# Patient Record
Sex: FEMALE | Race: WHITE | NOT HISPANIC OR LATINO
[De-identification: names, ages, dates, MRNs, and addresses within clinical notes are randomized per-mention and may not be internally consistent; named-entity substitution may affect disease eponyms.]

---

## 2017-02-21 ENCOUNTER — APPOINTMENT (OUTPATIENT)
Dept: NEUROLOGY | Facility: HOSPITAL | Age: 62
End: 2017-02-21

## 2017-02-21 ENCOUNTER — OUTPATIENT (OUTPATIENT)
Dept: OUTPATIENT SERVICES | Facility: HOSPITAL | Age: 62
LOS: 1 days | End: 2017-02-21

## 2017-02-21 VITALS
HEIGHT: 64 IN | SYSTOLIC BLOOD PRESSURE: 122 MMHG | BODY MASS INDEX: 24.59 KG/M2 | DIASTOLIC BLOOD PRESSURE: 86 MMHG | WEIGHT: 144 LBS | HEART RATE: 90 BPM

## 2017-02-21 DIAGNOSIS — Z82.8 FAMILY HISTORY OF OTHER DISABILITIES AND CHRONIC DISEASES LEADING TO DISABLEMENT, NOT ELSEWHERE CLASSIFIED: Chronic | ICD-10-CM

## 2017-02-22 DIAGNOSIS — M54.9 DORSALGIA, UNSPECIFIED: ICD-10-CM

## 2017-02-23 ENCOUNTER — RX RENEWAL (OUTPATIENT)
Age: 62
End: 2017-02-23

## 2017-02-28 ENCOUNTER — APPOINTMENT (OUTPATIENT)
Dept: INTERNAL MEDICINE | Facility: CLINIC | Age: 62
End: 2017-02-28

## 2017-02-28 VITALS — HEART RATE: 72 BPM

## 2017-02-28 VITALS — SYSTOLIC BLOOD PRESSURE: 119 MMHG | OXYGEN SATURATION: 95 % | DIASTOLIC BLOOD PRESSURE: 80 MMHG

## 2017-02-28 DIAGNOSIS — Z87.09 PERSONAL HISTORY OF OTHER DISEASES OF THE RESPIRATORY SYSTEM: ICD-10-CM

## 2017-02-28 DIAGNOSIS — Z01.818 ENCOUNTER FOR OTHER PREPROCEDURAL EXAMINATION: ICD-10-CM

## 2017-02-28 RX ORDER — GABAPENTIN 100 MG/1
100 CAPSULE ORAL
Qty: 30 | Refills: 0 | Status: DISCONTINUED | COMMUNITY
Start: 2017-02-21 | End: 2017-02-28

## 2017-02-28 RX ORDER — ACETAMINOPHEN 325 MG
TABLET ORAL
Refills: 0 | Status: ACTIVE | COMMUNITY

## 2017-02-28 RX ORDER — DIAZEPAM 10 MG/1
10 TABLET ORAL
Refills: 0 | Status: ACTIVE | COMMUNITY

## 2017-03-28 ENCOUNTER — OUTPATIENT (OUTPATIENT)
Dept: OUTPATIENT SERVICES | Facility: HOSPITAL | Age: 62
LOS: 1 days | End: 2017-03-28

## 2017-03-28 ENCOUNTER — APPOINTMENT (OUTPATIENT)
Dept: NEUROLOGY | Facility: HOSPITAL | Age: 62
End: 2017-03-28

## 2017-03-28 VITALS — HEIGHT: 64 IN | DIASTOLIC BLOOD PRESSURE: 85 MMHG | HEART RATE: 67 BPM | SYSTOLIC BLOOD PRESSURE: 127 MMHG

## 2017-03-28 DIAGNOSIS — Z82.8 FAMILY HISTORY OF OTHER DISABILITIES AND CHRONIC DISEASES LEADING TO DISABLEMENT, NOT ELSEWHERE CLASSIFIED: Chronic | ICD-10-CM

## 2017-03-29 DIAGNOSIS — M48.06 SPINAL STENOSIS, LUMBAR REGION: ICD-10-CM

## 2017-03-29 DIAGNOSIS — M54.9 DORSALGIA, UNSPECIFIED: ICD-10-CM

## 2017-04-06 ENCOUNTER — MEDICATION RENEWAL (OUTPATIENT)
Age: 62
End: 2017-04-06

## 2017-05-09 ENCOUNTER — OUTPATIENT (OUTPATIENT)
Dept: OUTPATIENT SERVICES | Facility: HOSPITAL | Age: 62
LOS: 1 days | End: 2017-05-09

## 2017-05-09 ENCOUNTER — APPOINTMENT (OUTPATIENT)
Dept: NEUROLOGY | Facility: HOSPITAL | Age: 62
End: 2017-05-09

## 2017-05-09 VITALS
WEIGHT: 142 LBS | HEIGHT: 64 IN | BODY MASS INDEX: 24.24 KG/M2 | HEART RATE: 73 BPM | SYSTOLIC BLOOD PRESSURE: 115 MMHG | DIASTOLIC BLOOD PRESSURE: 82 MMHG

## 2017-05-09 DIAGNOSIS — Z82.8 FAMILY HISTORY OF OTHER DISABILITIES AND CHRONIC DISEASES LEADING TO DISABLEMENT, NOT ELSEWHERE CLASSIFIED: Chronic | ICD-10-CM

## 2017-05-09 DIAGNOSIS — M54.9 DORSALGIA, UNSPECIFIED: ICD-10-CM

## 2017-05-09 DIAGNOSIS — M48.06 SPINAL STENOSIS, LUMBAR REGION: ICD-10-CM

## 2017-05-12 ENCOUNTER — APPOINTMENT (OUTPATIENT)
Dept: INTERNAL MEDICINE | Facility: CLINIC | Age: 62
End: 2017-05-12

## 2017-05-12 VITALS
SYSTOLIC BLOOD PRESSURE: 119 MMHG | DIASTOLIC BLOOD PRESSURE: 85 MMHG | OXYGEN SATURATION: 98 % | WEIGHT: 143 LBS | BODY MASS INDEX: 24.55 KG/M2 | HEART RATE: 73 BPM

## 2017-05-12 VITALS — TEMPERATURE: 98 F

## 2017-06-02 LAB
25(OH)D3 SERPL-MCNC: 51.9 NG/ML
ALBUMIN SERPL ELPH-MCNC: 4.2 G/DL
ALP BLD-CCNC: 57 U/L
ALT SERPL-CCNC: 23 U/L
ANION GAP SERPL CALC-SCNC: 16 MMOL/L
AST SERPL-CCNC: 29 U/L
BASOPHILS # BLD AUTO: 0.03 K/UL
BASOPHILS NFR BLD AUTO: 0.5 %
BILIRUB SERPL-MCNC: 0.3 MG/DL
BUN SERPL-MCNC: 9 MG/DL
CALCIUM SERPL-MCNC: 9.8 MG/DL
CHLORIDE SERPL-SCNC: 101 MMOL/L
CHOLEST SERPL-MCNC: 217 MG/DL
CHOLEST/HDLC SERPL: 3 RATIO
CO2 SERPL-SCNC: 22 MMOL/L
CREAT SERPL-MCNC: 0.95 MG/DL
EOSINOPHIL # BLD AUTO: 0.57 K/UL
EOSINOPHIL NFR BLD AUTO: 8.8 %
GLUCOSE SERPL-MCNC: 92 MG/DL
HBA1C MFR BLD HPLC: 5.9 %
HCT VFR BLD CALC: 37.9 %
HDLC SERPL-MCNC: 79 MG/DL
HGB BLD-MCNC: 12.9 G/DL
HIV1+2 AB SPEC QL IA.RAPID: NONREACTIVE
IMM GRANULOCYTES NFR BLD AUTO: 0.2 %
LDLC SERPL CALC-MCNC: 120 MG/DL
LPL SERPL-CCNC: 23 U/L
LYMPHOCYTES # BLD AUTO: 1.94 K/UL
LYMPHOCYTES NFR BLD AUTO: 29.8 %
MAN DIFF?: NORMAL
MCHC RBC-ENTMCNC: 32.3 PG
MCHC RBC-ENTMCNC: 34 GM/DL
MCV RBC AUTO: 94.8 FL
MONOCYTES # BLD AUTO: 0.52 K/UL
MONOCYTES NFR BLD AUTO: 8 %
NEUTROPHILS # BLD AUTO: 3.44 K/UL
NEUTROPHILS NFR BLD AUTO: 52.7 %
PLATELET # BLD AUTO: 345 K/UL
POTASSIUM SERPL-SCNC: 4.9 MMOL/L
PROT SERPL-MCNC: 6.7 G/DL
RBC # BLD: 4 M/UL
RBC # FLD: 13.9 %
SODIUM SERPL-SCNC: 139 MMOL/L
TRIGL SERPL-MCNC: 89 MG/DL
TSH SERPL-ACNC: 1.72 UIU/ML
WBC # FLD AUTO: 6.51 K/UL

## 2017-06-06 ENCOUNTER — OUTPATIENT (OUTPATIENT)
Dept: OUTPATIENT SERVICES | Facility: HOSPITAL | Age: 62
LOS: 1 days | End: 2017-06-06

## 2017-06-06 ENCOUNTER — APPOINTMENT (OUTPATIENT)
Dept: NEUROLOGY | Facility: HOSPITAL | Age: 62
End: 2017-06-06

## 2017-06-06 VITALS
WEIGHT: 143 LBS | HEART RATE: 78 BPM | BODY MASS INDEX: 24.41 KG/M2 | HEIGHT: 64 IN | DIASTOLIC BLOOD PRESSURE: 90 MMHG | SYSTOLIC BLOOD PRESSURE: 124 MMHG

## 2017-06-06 DIAGNOSIS — Z82.8 FAMILY HISTORY OF OTHER DISABILITIES AND CHRONIC DISEASES LEADING TO DISABLEMENT, NOT ELSEWHERE CLASSIFIED: Chronic | ICD-10-CM

## 2017-06-07 DIAGNOSIS — M48.06 SPINAL STENOSIS, LUMBAR REGION: ICD-10-CM

## 2017-06-19 ENCOUNTER — APPOINTMENT (OUTPATIENT)
Dept: INTERNAL MEDICINE | Facility: CLINIC | Age: 62
End: 2017-06-19

## 2017-06-19 VITALS
WEIGHT: 144 LBS | DIASTOLIC BLOOD PRESSURE: 90 MMHG | SYSTOLIC BLOOD PRESSURE: 130 MMHG | HEIGHT: 64 IN | BODY MASS INDEX: 24.59 KG/M2

## 2017-06-19 VITALS — DIASTOLIC BLOOD PRESSURE: 70 MMHG | SYSTOLIC BLOOD PRESSURE: 120 MMHG | HEART RATE: 76 BPM

## 2017-06-19 DIAGNOSIS — J45.991 COUGH VARIANT ASTHMA: ICD-10-CM

## 2017-06-19 DIAGNOSIS — K64.4 RESIDUAL HEMORRHOIDAL SKIN TAGS: ICD-10-CM

## 2017-06-19 DIAGNOSIS — R22.1 LOCALIZED SWELLING, MASS AND LUMP, NECK: ICD-10-CM

## 2017-06-19 DIAGNOSIS — Z81.8 FAMILY HISTORY OF OTHER MENTAL AND BEHAVIORAL DISORDERS: ICD-10-CM

## 2017-06-19 DIAGNOSIS — Z86.19 PERSONAL HISTORY OF OTHER INFECTIOUS AND PARASITIC DISEASES: ICD-10-CM

## 2017-06-19 DIAGNOSIS — Z87.42 PERSONAL HISTORY OF OTHER DISEASES OF THE FEMALE GENITAL TRACT: ICD-10-CM

## 2017-06-19 DIAGNOSIS — Z72.0 TOBACCO USE: ICD-10-CM

## 2017-06-19 DIAGNOSIS — Z00.00 ENCOUNTER FOR GENERAL ADULT MEDICAL EXAMINATION W/OUT ABNORMAL FINDINGS: ICD-10-CM

## 2017-06-19 DIAGNOSIS — R60.0 LOCALIZED EDEMA: ICD-10-CM

## 2017-06-19 DIAGNOSIS — Z71.6 TOBACCO ABUSE COUNSELING: ICD-10-CM

## 2017-06-19 DIAGNOSIS — R21 RASH AND OTHER NONSPECIFIC SKIN ERUPTION: ICD-10-CM

## 2017-06-19 DIAGNOSIS — I25.10 ATHEROSCLEROTIC HEART DISEASE OF NATIVE CORONARY ARTERY W/OUT ANGINA PECTORIS: ICD-10-CM

## 2017-06-19 DIAGNOSIS — F32.9 MAJOR DEPRESSIVE DISORDER, SINGLE EPISODE, UNSPECIFIED: ICD-10-CM

## 2017-06-19 RX ORDER — ALBUTEROL SULFATE 108 UG/1
108 (90 BASE) AEROSOL, METERED RESPIRATORY (INHALATION) EVERY 6 HOURS
Qty: 1 | Refills: 5 | Status: ACTIVE | COMMUNITY
Start: 2017-02-21 | End: 1900-01-01

## 2017-06-19 RX ORDER — TRAMADOL HYDROCHLORIDE 50 MG/1
50 TABLET, COATED ORAL
Qty: 60 | Refills: 1 | Status: DISCONTINUED | COMMUNITY
Start: 2017-05-09 | End: 2017-06-19

## 2017-06-19 RX ORDER — ESOMEPRAZOLE MAGNESIUM 40 MG/1
CAPSULE, DELAYED RELEASE ORAL
Refills: 0 | Status: ACTIVE | COMMUNITY

## 2017-06-19 RX ORDER — PSYLLIUM HUSK 0.4 G
CAPSULE ORAL
Refills: 0 | Status: DISCONTINUED | COMMUNITY
End: 2017-06-19

## 2017-06-19 RX ORDER — LINACLOTIDE 72 UG/1
CAPSULE, GELATIN COATED ORAL
Refills: 0 | Status: ACTIVE | COMMUNITY

## 2017-06-19 RX ORDER — NYSTATIN 100000 [USP'U]/ML
100000 SUSPENSION ORAL 4 TIMES DAILY
Qty: 1 | Refills: 3 | Status: DISCONTINUED | COMMUNITY
Start: 2017-05-12 | End: 2017-06-19

## 2017-06-19 RX ORDER — IBUPROFEN AND PHENYLEPHRINE HYDROCHLORIDE 200; 10 MG/1; MG/1
TABLET, FILM COATED ORAL
Refills: 0 | Status: ACTIVE | COMMUNITY

## 2017-07-05 ENCOUNTER — RX RENEWAL (OUTPATIENT)
Age: 62
End: 2017-07-05

## 2017-07-24 ENCOUNTER — FORM ENCOUNTER (OUTPATIENT)
Age: 62
End: 2017-07-24

## 2017-07-25 ENCOUNTER — APPOINTMENT (OUTPATIENT)
Dept: MAMMOGRAPHY | Facility: IMAGING CENTER | Age: 62
End: 2017-07-25

## 2017-07-25 ENCOUNTER — OUTPATIENT (OUTPATIENT)
Dept: OUTPATIENT SERVICES | Facility: HOSPITAL | Age: 62
LOS: 1 days | End: 2017-07-25
Payer: MEDICAID

## 2017-07-25 DIAGNOSIS — Z00.8 ENCOUNTER FOR OTHER GENERAL EXAMINATION: ICD-10-CM

## 2017-07-25 DIAGNOSIS — Z82.8 FAMILY HISTORY OF OTHER DISABILITIES AND CHRONIC DISEASES LEADING TO DISABLEMENT, NOT ELSEWHERE CLASSIFIED: Chronic | ICD-10-CM

## 2017-07-25 PROCEDURE — 77063 BREAST TOMOSYNTHESIS BI: CPT

## 2017-07-25 PROCEDURE — 77067 SCR MAMMO BI INCL CAD: CPT

## 2017-08-01 ENCOUNTER — OUTPATIENT (OUTPATIENT)
Dept: OUTPATIENT SERVICES | Facility: HOSPITAL | Age: 62
LOS: 1 days | End: 2017-08-01

## 2017-08-01 ENCOUNTER — APPOINTMENT (OUTPATIENT)
Dept: NEUROLOGY | Facility: HOSPITAL | Age: 62
End: 2017-08-01

## 2017-08-01 VITALS
SYSTOLIC BLOOD PRESSURE: 139 MMHG | WEIGHT: 147 LBS | HEIGHT: 64 IN | HEART RATE: 76 BPM | DIASTOLIC BLOOD PRESSURE: 85 MMHG | BODY MASS INDEX: 25.1 KG/M2

## 2017-08-01 DIAGNOSIS — Z82.8 FAMILY HISTORY OF OTHER DISABILITIES AND CHRONIC DISEASES LEADING TO DISABLEMENT, NOT ELSEWHERE CLASSIFIED: Chronic | ICD-10-CM

## 2017-08-01 DIAGNOSIS — M54.9 DORSALGIA, UNSPECIFIED: ICD-10-CM

## 2017-08-09 ENCOUNTER — FORM ENCOUNTER (OUTPATIENT)
Age: 62
End: 2017-08-09

## 2017-08-10 ENCOUNTER — APPOINTMENT (OUTPATIENT)
Dept: CT IMAGING | Facility: IMAGING CENTER | Age: 62
End: 2017-08-10
Payer: MEDICAID

## 2017-08-10 ENCOUNTER — OUTPATIENT (OUTPATIENT)
Dept: OUTPATIENT SERVICES | Facility: HOSPITAL | Age: 62
LOS: 1 days | End: 2017-08-10
Payer: MEDICAID

## 2017-08-10 DIAGNOSIS — Z00.00 ENCOUNTER FOR GENERAL ADULT MEDICAL EXAMINATION WITHOUT ABNORMAL FINDINGS: ICD-10-CM

## 2017-08-10 DIAGNOSIS — Z82.8 FAMILY HISTORY OF OTHER DISABILITIES AND CHRONIC DISEASES LEADING TO DISABLEMENT, NOT ELSEWHERE CLASSIFIED: Chronic | ICD-10-CM

## 2017-08-10 PROCEDURE — G0297: CPT

## 2017-08-10 PROCEDURE — G0297: CPT | Mod: 26

## 2017-08-16 PROBLEM — I25.10 ATHEROSCLEROSIS OF CORONARY ARTERY: Status: ACTIVE | Noted: 2017-08-16

## 2017-09-05 ENCOUNTER — APPOINTMENT (OUTPATIENT)
Dept: NEUROLOGY | Facility: HOSPITAL | Age: 62
End: 2017-09-05

## 2017-09-05 ENCOUNTER — OUTPATIENT (OUTPATIENT)
Dept: OUTPATIENT SERVICES | Facility: HOSPITAL | Age: 62
LOS: 1 days | End: 2017-09-05

## 2017-09-05 VITALS
HEIGHT: 64 IN | SYSTOLIC BLOOD PRESSURE: 143 MMHG | HEART RATE: 74 BPM | DIASTOLIC BLOOD PRESSURE: 82 MMHG | BODY MASS INDEX: 25.1 KG/M2 | WEIGHT: 147 LBS

## 2017-09-05 DIAGNOSIS — Z82.8 FAMILY HISTORY OF OTHER DISABILITIES AND CHRONIC DISEASES LEADING TO DISABLEMENT, NOT ELSEWHERE CLASSIFIED: Chronic | ICD-10-CM

## 2017-09-05 DIAGNOSIS — M79.1 MYALGIA: ICD-10-CM

## 2017-10-03 ENCOUNTER — APPOINTMENT (OUTPATIENT)
Dept: NEUROLOGY | Facility: HOSPITAL | Age: 62
End: 2017-10-03

## 2017-10-03 ENCOUNTER — OUTPATIENT (OUTPATIENT)
Dept: OUTPATIENT SERVICES | Facility: HOSPITAL | Age: 62
LOS: 1 days | End: 2017-10-03

## 2017-10-03 ENCOUNTER — LABORATORY RESULT (OUTPATIENT)
Age: 62
End: 2017-10-03

## 2017-10-03 VITALS
BODY MASS INDEX: 25.95 KG/M2 | DIASTOLIC BLOOD PRESSURE: 89 MMHG | SYSTOLIC BLOOD PRESSURE: 124 MMHG | WEIGHT: 152 LBS | HEIGHT: 64 IN | HEART RATE: 74 BPM

## 2017-10-03 DIAGNOSIS — Z82.8 FAMILY HISTORY OF OTHER DISABILITIES AND CHRONIC DISEASES LEADING TO DISABLEMENT, NOT ELSEWHERE CLASSIFIED: Chronic | ICD-10-CM

## 2017-10-03 LAB
AMPHET UR-MCNC: NEGATIVE — SIGNIFICANT CHANGE UP
BARBITURATES UR SCN-MCNC: NEGATIVE — SIGNIFICANT CHANGE UP
BENZODIAZ UR-MCNC: POSITIVE — SIGNIFICANT CHANGE UP
CANNABINOIDS UR-MCNC: NEGATIVE — SIGNIFICANT CHANGE UP
COCAINE METAB.OTHER UR-MCNC: NEGATIVE — SIGNIFICANT CHANGE UP
METHADONE UR-MCNC: NEGATIVE — SIGNIFICANT CHANGE UP
OPIATES UR-MCNC: POSITIVE — SIGNIFICANT CHANGE UP
OXYCODONE UR-MCNC: NEGATIVE — SIGNIFICANT CHANGE UP
PCP UR-MCNC: NEGATIVE — SIGNIFICANT CHANGE UP

## 2017-10-13 DIAGNOSIS — M54.5 LOW BACK PAIN: ICD-10-CM

## 2017-10-13 DIAGNOSIS — G89.29 OTHER CHRONIC PAIN: ICD-10-CM

## 2017-10-31 ENCOUNTER — OUTPATIENT (OUTPATIENT)
Dept: OUTPATIENT SERVICES | Facility: HOSPITAL | Age: 62
LOS: 1 days | End: 2017-10-31

## 2017-10-31 ENCOUNTER — APPOINTMENT (OUTPATIENT)
Dept: NEUROLOGY | Facility: HOSPITAL | Age: 62
End: 2017-10-31

## 2017-10-31 VITALS
HEIGHT: 64 IN | BODY MASS INDEX: 25.44 KG/M2 | SYSTOLIC BLOOD PRESSURE: 137 MMHG | DIASTOLIC BLOOD PRESSURE: 94 MMHG | HEART RATE: 71 BPM | WEIGHT: 149 LBS

## 2017-10-31 DIAGNOSIS — Z82.8 FAMILY HISTORY OF OTHER DISABILITIES AND CHRONIC DISEASES LEADING TO DISABLEMENT, NOT ELSEWHERE CLASSIFIED: Chronic | ICD-10-CM

## 2017-11-01 DIAGNOSIS — M54.9 DORSALGIA, UNSPECIFIED: ICD-10-CM

## 2017-11-03 ENCOUNTER — OTHER (OUTPATIENT)
Age: 62
End: 2017-11-03

## 2017-11-05 ENCOUNTER — EMERGENCY (EMERGENCY)
Facility: HOSPITAL | Age: 62
LOS: 1 days | Discharge: HOME | End: 2017-11-05
Attending: EMERGENCY MEDICINE | Admitting: EMERGENCY MEDICINE
Payer: MEDICAID

## 2017-11-05 VITALS
OXYGEN SATURATION: 100 % | HEART RATE: 84 BPM | TEMPERATURE: 99 F | DIASTOLIC BLOOD PRESSURE: 101 MMHG | SYSTOLIC BLOOD PRESSURE: 156 MMHG | RESPIRATION RATE: 18 BRPM

## 2017-11-05 DIAGNOSIS — Z82.8 FAMILY HISTORY OF OTHER DISABILITIES AND CHRONIC DISEASES LEADING TO DISABLEMENT, NOT ELSEWHERE CLASSIFIED: Chronic | ICD-10-CM

## 2017-11-05 PROCEDURE — 99283 EMERGENCY DEPT VISIT LOW MDM: CPT

## 2017-11-05 RX ORDER — HYDROMORPHONE HYDROCHLORIDE 2 MG/ML
1 INJECTION INTRAMUSCULAR; INTRAVENOUS; SUBCUTANEOUS
Qty: 14 | Refills: 0
Start: 2017-11-05 | End: 2017-11-12

## 2017-11-05 NOTE — ED PROVIDER NOTE - PROGRESS NOTE DETAILS
I sent rx for 7 days of her 4mg bid Hydromorphone after confirming no successful rx on allscripts and reviewing iSTOP. Pt to call pain clinic this week (states they are there on Tuesdays) to obtain further rx.

## 2017-11-05 NOTE — ED PROVIDER NOTE - MEDICAL DECISION MAKING DETAILS
Back pain, chronic, no neuro deficits.  no fevers.  Out of her meds and Clinic rx did not go through for some reason. Will provide short rx pending clinic f/u this week.

## 2017-11-05 NOTE — ED ADULT TRIAGE NOTE - CHIEF COMPLAINT QUOTE
Patient reports being seen at neurology center and prescribed hydromorphone for chronic pain. Patient reports she is unable to get the medication prescribed from pharmacy.

## 2017-11-05 NOTE — ED PROVIDER NOTE - PMH
Anxiety    Chronic midline back pain, unspecified back location    COPD (chronic obstructive pulmonary disease)    GERD (gastroesophageal reflux disease)

## 2017-11-05 NOTE — ED PROVIDER NOTE - OBJECTIVE STATEMENT
62 yr old F c complex PMhx including remote Benzo/EtOH overdose, chronic back pain on Hydromorphone (managed by Dr. Abdul at [facility]), recent abscess, who p/w "I couldn't get my Hydromorphone filled".  Per pt rx was not transmitted and she is now suffering from pain, more than usual. States she spoke with clinic late last week and they said it would be fixed, but not available yet. Not asking for any pain med right now. No neuro deficits, no change in her chronic pain. I reviewed Allscripts and confirmed that on 10/31 rx for Hydromorphone 4mg bid was sent too pharmacy but status is noted as 'Denied", there was another script also not transmitted. iSTOP report #[id number] does not show evidence of excessive dosing/rx - only once monthly rx for Hydromorphone by Dr. Abdul. No recent change in chronic conditions. NAD.

## 2017-11-06 ENCOUNTER — MESSAGE (OUTPATIENT)
Age: 62
End: 2017-11-06

## 2017-11-06 ENCOUNTER — RX RENEWAL (OUTPATIENT)
Age: 62
End: 2017-11-06

## 2017-11-06 DIAGNOSIS — Z98.890 OTHER SPECIFIED POSTPROCEDURAL STATES: Chronic | ICD-10-CM

## 2017-11-09 ENCOUNTER — APPOINTMENT (OUTPATIENT)
Dept: PULMONOLOGY | Facility: CLINIC | Age: 62
End: 2017-11-09

## 2017-12-05 ENCOUNTER — RX RENEWAL (OUTPATIENT)
Age: 62
End: 2017-12-05

## 2018-01-02 ENCOUNTER — APPOINTMENT (OUTPATIENT)
Dept: NEUROLOGY | Facility: HOSPITAL | Age: 63
End: 2018-01-02

## 2018-01-02 ENCOUNTER — OUTPATIENT (OUTPATIENT)
Dept: OUTPATIENT SERVICES | Facility: HOSPITAL | Age: 63
LOS: 1 days | End: 2018-01-02

## 2018-01-02 VITALS
WEIGHT: 148.2 LBS | BODY MASS INDEX: 25.3 KG/M2 | HEIGHT: 64 IN | HEART RATE: 89 BPM | SYSTOLIC BLOOD PRESSURE: 128 MMHG | DIASTOLIC BLOOD PRESSURE: 99 MMHG

## 2018-01-02 DIAGNOSIS — Z82.8 FAMILY HISTORY OF OTHER DISABILITIES AND CHRONIC DISEASES LEADING TO DISABLEMENT, NOT ELSEWHERE CLASSIFIED: Chronic | ICD-10-CM

## 2018-01-02 DIAGNOSIS — Z98.890 OTHER SPECIFIED POSTPROCEDURAL STATES: Chronic | ICD-10-CM

## 2018-01-06 ENCOUNTER — EMERGENCY (EMERGENCY)
Facility: HOSPITAL | Age: 63
LOS: 1 days | Discharge: HOME | End: 2018-01-06
Admitting: EMERGENCY MEDICINE
Payer: MEDICAID

## 2018-01-06 VITALS
RESPIRATION RATE: 18 BRPM | DIASTOLIC BLOOD PRESSURE: 100 MMHG | SYSTOLIC BLOOD PRESSURE: 144 MMHG | OXYGEN SATURATION: 100 % | TEMPERATURE: 99 F | HEART RATE: 83 BPM

## 2018-01-06 DIAGNOSIS — Z98.890 OTHER SPECIFIED POSTPROCEDURAL STATES: Chronic | ICD-10-CM

## 2018-01-06 DIAGNOSIS — Z82.8 FAMILY HISTORY OF OTHER DISABILITIES AND CHRONIC DISEASES LEADING TO DISABLEMENT, NOT ELSEWHERE CLASSIFIED: Chronic | ICD-10-CM

## 2018-01-06 PROCEDURE — 99281 EMR DPT VST MAYX REQ PHY/QHP: CPT

## 2018-01-06 RX ORDER — HYDROMORPHONE HYDROCHLORIDE 2 MG/ML
1 INJECTION INTRAMUSCULAR; INTRAVENOUS; SUBCUTANEOUS
Qty: 8 | Refills: 0
Start: 2018-01-06 | End: 2018-01-09

## 2018-01-06 NOTE — ED PROVIDER NOTE - CHPI ED SYMPTOMS NEG
no numbness/no vomiting/no weakness/no tingling/no decreased eating/drinking/no dizziness/no fever/no nausea/no chills

## 2018-01-06 NOTE — ED PROVIDER NOTE - PHYSICAL EXAMINATION
BACK: no midline tenderness, no palpable step offs, no erythema/ecchymosis/swelling, FROM. Non tender throughout. NVI. sensate intact. Able to ambulate.

## 2018-01-06 NOTE — ED ADULT TRIAGE NOTE - CHIEF COMPLAINT QUOTE
ran out of meds    pt states her prescription for hydromorphone did not go through and ran out of meds. no complaints other than her chronic pain

## 2018-01-06 NOTE — ED PROVIDER NOTE - MEDICAL DECISION MAKING DETAILS
61 yo F here for medication refill, chronically on diluadid 4mg BID for severe pain 2/2 to chronic back pain. Pt without additional symptoms or complaints. Pain consistent with chronic back pain. Discussed with patient istop without e/o abuse or inappropriate prescribing. Will give short course to cover until follow up with neuro for re-send of full rx. pt agrees with plan. educated on risks and side effects of medication, no driving, operating machinery, etc.

## 2018-01-06 NOTE — ED PROVIDER NOTE - OBJECTIVE STATEMENT
61 yo F hx of chronic pain here for medication refill. Pt reports she gets her monthly Dilaudid dose of 4mg BID for severe pain from her Neuro Dr. Abdul however the last rx that was sent did not go through to patients pharmacy prompting patient to arrive to ED today. Pt states she tried to call office however was post hours and could not get in touch with covering MD. Pt reports that she is at her baseline for her chronic pain. Pt declines needing pain meds in ED. Pt reports that she has no additional complaints. Pt looked up on Istop #[id number] with last dilaudid refill on 12/6 which corresponds to today's date of 1/6 as patient is on monthly dosing. Pt without additional complaints, declining pain meds in ED. Normal BM and UOP, no incontinence, numbness, saddle anesthesia. No evidence of recent rx or abuse on istop. Discussed with patient can give short course to cover for remainder of weekend however will have to get in touch with Dr. Abdul's office on Monday for full refill. pt agrees with this plan. Denies fever chills vomiting diarrhea cp sob weakness HA dizziness. Denies IVDU.

## 2018-01-09 ENCOUNTER — RX RENEWAL (OUTPATIENT)
Age: 63
End: 2018-01-09

## 2018-01-12 DIAGNOSIS — M54.9 DORSALGIA, UNSPECIFIED: ICD-10-CM

## 2018-01-29 ENCOUNTER — MESSAGE (OUTPATIENT)
Age: 63
End: 2018-01-29

## 2018-01-29 RX ORDER — ROSUVASTATIN CALCIUM 5 MG/1
5 TABLET, FILM COATED ORAL
Qty: 30 | Refills: 5 | Status: DISCONTINUED | COMMUNITY
Start: 2018-01-11 | End: 2018-01-29

## 2018-01-29 RX ORDER — ATORVASTATIN CALCIUM 10 MG/1
10 TABLET, FILM COATED ORAL
Qty: 30 | Refills: 3 | Status: ACTIVE | COMMUNITY
Start: 2018-01-29 | End: 1900-01-01

## 2018-02-06 ENCOUNTER — OUTPATIENT (OUTPATIENT)
Dept: OUTPATIENT SERVICES | Facility: HOSPITAL | Age: 63
LOS: 1 days | End: 2018-02-06

## 2018-02-06 ENCOUNTER — APPOINTMENT (OUTPATIENT)
Dept: NEUROLOGY | Facility: HOSPITAL | Age: 63
End: 2018-02-06

## 2018-02-06 VITALS
WEIGHT: 147 LBS | SYSTOLIC BLOOD PRESSURE: 136 MMHG | BODY MASS INDEX: 25.1 KG/M2 | HEIGHT: 64 IN | DIASTOLIC BLOOD PRESSURE: 91 MMHG | HEART RATE: 86 BPM

## 2018-02-06 DIAGNOSIS — Z82.8 FAMILY HISTORY OF OTHER DISABILITIES AND CHRONIC DISEASES LEADING TO DISABLEMENT, NOT ELSEWHERE CLASSIFIED: Chronic | ICD-10-CM

## 2018-02-06 DIAGNOSIS — Z98.890 OTHER SPECIFIED POSTPROCEDURAL STATES: Chronic | ICD-10-CM

## 2018-03-06 ENCOUNTER — APPOINTMENT (OUTPATIENT)
Dept: NEUROLOGY | Facility: HOSPITAL | Age: 63
End: 2018-03-06

## 2018-03-06 ENCOUNTER — RESULT REVIEW (OUTPATIENT)
Age: 63
End: 2018-03-06

## 2018-04-03 ENCOUNTER — OUTPATIENT (OUTPATIENT)
Dept: OUTPATIENT SERVICES | Facility: HOSPITAL | Age: 63
LOS: 1 days | End: 2018-04-03

## 2018-04-03 ENCOUNTER — TRANSCRIPTION ENCOUNTER (OUTPATIENT)
Age: 63
End: 2018-04-03

## 2018-04-03 ENCOUNTER — APPOINTMENT (OUTPATIENT)
Dept: NEUROLOGY | Facility: HOSPITAL | Age: 63
End: 2018-04-03

## 2018-04-03 VITALS
HEIGHT: 64 IN | SYSTOLIC BLOOD PRESSURE: 130 MMHG | HEART RATE: 78 BPM | DIASTOLIC BLOOD PRESSURE: 89 MMHG | BODY MASS INDEX: 25.95 KG/M2 | WEIGHT: 152 LBS

## 2018-04-03 DIAGNOSIS — Z98.890 OTHER SPECIFIED POSTPROCEDURAL STATES: Chronic | ICD-10-CM

## 2018-04-03 DIAGNOSIS — Z82.8 FAMILY HISTORY OF OTHER DISABILITIES AND CHRONIC DISEASES LEADING TO DISABLEMENT, NOT ELSEWHERE CLASSIFIED: Chronic | ICD-10-CM

## 2018-04-04 DIAGNOSIS — M48.061 SPINAL STENOSIS, LUMBAR REGION WITHOUT NEUROGENIC CLAUDICATION: ICD-10-CM

## 2018-05-01 ENCOUNTER — APPOINTMENT (OUTPATIENT)
Dept: NEUROLOGY | Facility: HOSPITAL | Age: 63
End: 2018-05-01

## 2018-05-01 ENCOUNTER — LABORATORY RESULT (OUTPATIENT)
Age: 63
End: 2018-05-01

## 2018-05-01 ENCOUNTER — OUTPATIENT (OUTPATIENT)
Dept: OUTPATIENT SERVICES | Facility: HOSPITAL | Age: 63
LOS: 1 days | End: 2018-05-01

## 2018-05-01 DIAGNOSIS — Z82.8 FAMILY HISTORY OF OTHER DISABILITIES AND CHRONIC DISEASES LEADING TO DISABLEMENT, NOT ELSEWHERE CLASSIFIED: Chronic | ICD-10-CM

## 2018-05-01 DIAGNOSIS — Z98.890 OTHER SPECIFIED POSTPROCEDURAL STATES: Chronic | ICD-10-CM

## 2018-05-04 DIAGNOSIS — M48.061 SPINAL STENOSIS, LUMBAR REGION WITHOUT NEUROGENIC CLAUDICATION: ICD-10-CM

## 2018-05-05 ENCOUNTER — TRANSCRIPTION ENCOUNTER (OUTPATIENT)
Age: 63
End: 2018-05-05

## 2018-05-08 ENCOUNTER — RX RENEWAL (OUTPATIENT)
Age: 63
End: 2018-05-08

## 2018-05-16 ENCOUNTER — FORM ENCOUNTER (OUTPATIENT)
Age: 63
End: 2018-05-16

## 2018-05-17 ENCOUNTER — APPOINTMENT (OUTPATIENT)
Dept: MRI IMAGING | Facility: IMAGING CENTER | Age: 63
End: 2018-05-17

## 2018-05-17 ENCOUNTER — OUTPATIENT (OUTPATIENT)
Dept: OUTPATIENT SERVICES | Facility: HOSPITAL | Age: 63
LOS: 1 days | End: 2018-05-17
Payer: MEDICAID

## 2018-05-17 DIAGNOSIS — M48.061 SPINAL STENOSIS, LUMBAR REGION WITHOUT NEUROGENIC CLAUDICATION: ICD-10-CM

## 2018-05-17 DIAGNOSIS — Z98.890 OTHER SPECIFIED POSTPROCEDURAL STATES: Chronic | ICD-10-CM

## 2018-05-17 DIAGNOSIS — Z82.8 FAMILY HISTORY OF OTHER DISABILITIES AND CHRONIC DISEASES LEADING TO DISABLEMENT, NOT ELSEWHERE CLASSIFIED: Chronic | ICD-10-CM

## 2018-05-17 DIAGNOSIS — Z00.8 ENCOUNTER FOR OTHER GENERAL EXAMINATION: ICD-10-CM

## 2018-05-17 PROCEDURE — 72148 MRI LUMBAR SPINE W/O DYE: CPT | Mod: 26

## 2018-05-17 PROCEDURE — 72148 MRI LUMBAR SPINE W/O DYE: CPT

## 2018-05-29 ENCOUNTER — OUTPATIENT (OUTPATIENT)
Dept: OUTPATIENT SERVICES | Facility: HOSPITAL | Age: 63
LOS: 1 days | End: 2018-05-29

## 2018-05-29 ENCOUNTER — APPOINTMENT (OUTPATIENT)
Dept: NEUROLOGY | Facility: HOSPITAL | Age: 63
End: 2018-05-29

## 2018-05-29 VITALS
HEIGHT: 64 IN | SYSTOLIC BLOOD PRESSURE: 122 MMHG | WEIGHT: 147.6 LBS | BODY MASS INDEX: 25.2 KG/M2 | DIASTOLIC BLOOD PRESSURE: 88 MMHG | HEART RATE: 94 BPM

## 2018-05-29 DIAGNOSIS — Z98.890 OTHER SPECIFIED POSTPROCEDURAL STATES: Chronic | ICD-10-CM

## 2018-05-29 DIAGNOSIS — M48.061 SPINAL STENOSIS, LUMBAR REGION WITHOUT NEUROGENIC CLAUDICATION: ICD-10-CM

## 2018-05-29 DIAGNOSIS — Z82.8 FAMILY HISTORY OF OTHER DISABILITIES AND CHRONIC DISEASES LEADING TO DISABLEMENT, NOT ELSEWHERE CLASSIFIED: Chronic | ICD-10-CM

## 2018-05-30 DIAGNOSIS — M48.061 SPINAL STENOSIS, LUMBAR REGION WITHOUT NEUROGENIC CLAUDICATION: ICD-10-CM

## 2018-06-02 ENCOUNTER — TRANSCRIPTION ENCOUNTER (OUTPATIENT)
Age: 63
End: 2018-06-02

## 2018-06-04 ENCOUNTER — TRANSCRIPTION ENCOUNTER (OUTPATIENT)
Age: 63
End: 2018-06-04

## 2018-06-05 ENCOUNTER — TRANSCRIPTION ENCOUNTER (OUTPATIENT)
Age: 63
End: 2018-06-05

## 2018-06-18 RX ORDER — LINACLOTIDE 290 UG/1
290 CAPSULE, GELATIN COATED ORAL
Qty: 30 | Refills: 3 | Status: ACTIVE | COMMUNITY
Start: 2018-06-12 | End: 1900-01-01

## 2018-06-28 ENCOUNTER — OTHER (OUTPATIENT)
Age: 63
End: 2018-06-28

## 2018-07-01 ENCOUNTER — EMERGENCY (EMERGENCY)
Facility: HOSPITAL | Age: 63
LOS: 1 days | Discharge: HOME | End: 2018-07-01
Attending: EMERGENCY MEDICINE | Admitting: EMERGENCY MEDICINE
Payer: MEDICAID

## 2018-07-01 ENCOUNTER — OUTPATIENT (OUTPATIENT)
Dept: OUTPATIENT SERVICES | Facility: HOSPITAL | Age: 63
LOS: 1 days | End: 2018-07-01
Payer: MEDICAID

## 2018-07-01 VITALS
TEMPERATURE: 98 F | DIASTOLIC BLOOD PRESSURE: 100 MMHG | RESPIRATION RATE: 17 BRPM | OXYGEN SATURATION: 99 % | SYSTOLIC BLOOD PRESSURE: 130 MMHG | HEART RATE: 73 BPM

## 2018-07-01 VITALS
RESPIRATION RATE: 16 BRPM | TEMPERATURE: 98 F | SYSTOLIC BLOOD PRESSURE: 164 MMHG | DIASTOLIC BLOOD PRESSURE: 108 MMHG | HEART RATE: 83 BPM | OXYGEN SATURATION: 99 %

## 2018-07-01 DIAGNOSIS — Z82.8 FAMILY HISTORY OF OTHER DISABILITIES AND CHRONIC DISEASES LEADING TO DISABLEMENT, NOT ELSEWHERE CLASSIFIED: Chronic | ICD-10-CM

## 2018-07-01 DIAGNOSIS — Z98.890 OTHER SPECIFIED POSTPROCEDURAL STATES: Chronic | ICD-10-CM

## 2018-07-01 LAB
ALBUMIN SERPL ELPH-MCNC: 4.5 G/DL — SIGNIFICANT CHANGE UP (ref 3.3–5)
ALP SERPL-CCNC: 53 U/L — SIGNIFICANT CHANGE UP (ref 40–120)
ALT FLD-CCNC: 23 U/L — SIGNIFICANT CHANGE UP (ref 4–33)
AST SERPL-CCNC: 28 U/L — SIGNIFICANT CHANGE UP (ref 4–32)
BASE EXCESS BLDV CALC-SCNC: 1 MMOL/L — SIGNIFICANT CHANGE UP
BASOPHILS # BLD AUTO: 0.03 K/UL — SIGNIFICANT CHANGE UP (ref 0–0.2)
BASOPHILS NFR BLD AUTO: 0.4 % — SIGNIFICANT CHANGE UP (ref 0–2)
BILIRUB SERPL-MCNC: 0.3 MG/DL — SIGNIFICANT CHANGE UP (ref 0.2–1.2)
BLOOD GAS VENOUS - CREATININE: 1 MG/DL — SIGNIFICANT CHANGE UP (ref 0.5–1.3)
BUN SERPL-MCNC: 16 MG/DL — SIGNIFICANT CHANGE UP (ref 7–23)
CALCIUM SERPL-MCNC: 9.3 MG/DL — SIGNIFICANT CHANGE UP (ref 8.4–10.5)
CHLORIDE BLDV-SCNC: 98 MMOL/L — SIGNIFICANT CHANGE UP (ref 96–108)
CHLORIDE SERPL-SCNC: 93 MMOL/L — LOW (ref 98–107)
CO2 SERPL-SCNC: 25 MMOL/L — SIGNIFICANT CHANGE UP (ref 22–31)
CREAT SERPL-MCNC: 1.01 MG/DL — SIGNIFICANT CHANGE UP (ref 0.5–1.3)
EOSINOPHIL # BLD AUTO: 0.07 K/UL — SIGNIFICANT CHANGE UP (ref 0–0.5)
EOSINOPHIL NFR BLD AUTO: 0.9 % — SIGNIFICANT CHANGE UP (ref 0–6)
GAS PNL BLDV: 129 MMOL/L — LOW (ref 136–146)
GLUCOSE BLDV-MCNC: 68 — LOW (ref 70–99)
GLUCOSE SERPL-MCNC: 90 MG/DL — SIGNIFICANT CHANGE UP (ref 70–99)
HCO3 BLDV-SCNC: 23 MMOL/L — SIGNIFICANT CHANGE UP (ref 20–27)
HCT VFR BLD CALC: 35.9 % — SIGNIFICANT CHANGE UP (ref 34.5–45)
HCT VFR BLDV CALC: 38.7 % — SIGNIFICANT CHANGE UP (ref 34.5–45)
HGB BLD-MCNC: 11.7 G/DL — SIGNIFICANT CHANGE UP (ref 11.5–15.5)
HGB BLDV-MCNC: 12.6 G/DL — SIGNIFICANT CHANGE UP (ref 11.5–15.5)
IMM GRANULOCYTES # BLD AUTO: 0.04 # — SIGNIFICANT CHANGE UP
IMM GRANULOCYTES NFR BLD AUTO: 0.5 % — SIGNIFICANT CHANGE UP (ref 0–1.5)
LACTATE BLDV-MCNC: 1 MMOL/L — SIGNIFICANT CHANGE UP (ref 0.5–2)
LYMPHOCYTES # BLD AUTO: 2.41 K/UL — SIGNIFICANT CHANGE UP (ref 1–3.3)
LYMPHOCYTES # BLD AUTO: 29.6 % — SIGNIFICANT CHANGE UP (ref 13–44)
MCHC RBC-ENTMCNC: 31.3 PG — SIGNIFICANT CHANGE UP (ref 27–34)
MCHC RBC-ENTMCNC: 32.6 % — SIGNIFICANT CHANGE UP (ref 32–36)
MCV RBC AUTO: 96 FL — SIGNIFICANT CHANGE UP (ref 80–100)
MONOCYTES # BLD AUTO: 0.59 K/UL — SIGNIFICANT CHANGE UP (ref 0–0.9)
MONOCYTES NFR BLD AUTO: 7.3 % — SIGNIFICANT CHANGE UP (ref 2–14)
NEUTROPHILS # BLD AUTO: 4.99 K/UL — SIGNIFICANT CHANGE UP (ref 1.8–7.4)
NEUTROPHILS NFR BLD AUTO: 61.3 % — SIGNIFICANT CHANGE UP (ref 43–77)
NRBC # FLD: 0 — SIGNIFICANT CHANGE UP
PCO2 BLDV: 49 MMHG — SIGNIFICANT CHANGE UP (ref 41–51)
PH BLDV: 7.35 PH — SIGNIFICANT CHANGE UP (ref 7.32–7.43)
PLATELET # BLD AUTO: 295 K/UL — SIGNIFICANT CHANGE UP (ref 150–400)
PMV BLD: 8.5 FL — SIGNIFICANT CHANGE UP (ref 7–13)
PO2 BLDV: < 24 MMHG — LOW (ref 35–40)
POTASSIUM BLDV-SCNC: 3.5 MMOL/L — SIGNIFICANT CHANGE UP (ref 3.4–4.5)
POTASSIUM SERPL-MCNC: 3.7 MMOL/L — SIGNIFICANT CHANGE UP (ref 3.5–5.3)
POTASSIUM SERPL-SCNC: 3.7 MMOL/L — SIGNIFICANT CHANGE UP (ref 3.5–5.3)
PROT SERPL-MCNC: 7.2 G/DL — SIGNIFICANT CHANGE UP (ref 6–8.3)
RBC # BLD: 3.74 M/UL — LOW (ref 3.8–5.2)
RBC # FLD: 12.3 % — SIGNIFICANT CHANGE UP (ref 10.3–14.5)
SAO2 % BLDV: 32.5 % — LOW (ref 60–85)
SODIUM SERPL-SCNC: 131 MMOL/L — LOW (ref 135–145)
TROPONIN T, HIGH SENSITIVITY: 18 NG/L — SIGNIFICANT CHANGE UP (ref ?–14)
TROPONIN T, HIGH SENSITIVITY: 19 NG/L — SIGNIFICANT CHANGE UP (ref ?–14)
WBC # BLD: 8.13 K/UL — SIGNIFICANT CHANGE UP (ref 3.8–10.5)
WBC # FLD AUTO: 8.13 K/UL — SIGNIFICANT CHANGE UP (ref 3.8–10.5)

## 2018-07-01 PROCEDURE — 99285 EMERGENCY DEPT VISIT HI MDM: CPT

## 2018-07-01 PROCEDURE — G9001: CPT

## 2018-07-01 PROCEDURE — 71046 X-RAY EXAM CHEST 2 VIEWS: CPT | Mod: 26

## 2018-07-01 RX ORDER — SODIUM CHLORIDE 9 MG/ML
1000 INJECTION INTRAMUSCULAR; INTRAVENOUS; SUBCUTANEOUS ONCE
Qty: 0 | Refills: 0 | Status: COMPLETED | OUTPATIENT
Start: 2018-07-01 | End: 2018-07-01

## 2018-07-01 RX ADMIN — SODIUM CHLORIDE 1000 MILLILITER(S): 9 INJECTION INTRAMUSCULAR; INTRAVENOUS; SUBCUTANEOUS at 20:22

## 2018-07-01 NOTE — ED PROVIDER NOTE - ATTENDING CONTRIBUTION TO CARE
agree with resident note  62F PMH spinal stenosis s/p laminectomy 1.5 years ago, COPD (active smoker) who presents with following hx; woke up today and upon getting up from bed felt dizzy.  States is on diet where she eats one meal a day and has had minimal PO intake over the last 1-2 days (temps above 90 degrees).  By time I had spoken to her had been ordered and received 1L of IVF and felt much better.   Denies CP at any point to me (even though stated to resident), denies SOB.    PE: well appearing but anxious; VSS: CTAB/L; s1 s2 no m/r/g abd soft/NT/ND ext: no edema Neuro: 5/5 motor UE and LE; FTN wnl; CATHERINE wnl; no nystagmus; gait stable

## 2018-07-01 NOTE — ED ADULT NURSE NOTE - OBJECTIVE STATEMENT
break coverage RN- pt c/o dizziness/ lightheadedness since this morning, worsening when standing up; denies any CP, SOB, n/v/d, weakness, visual changes. pt on Dilaudid 4mg 2x/day last dose around 1230pm; PMH emphysema, spinal stenosis. pt ambulatory to room, awake, a/ox3, vitals as noted in NAD. MD at bedside, awaiting further orders from MD, will continue to monitor, pt safety maintained.

## 2018-07-01 NOTE — ED ADULT NURSE REASSESSMENT NOTE - NS ED NURSE REASSESS COMMENT FT1
rec'd pt. back from xr, a&ox3, with g20 saline lock on rt ac with no ss of infiltration. denies any pain nor dizziness at this time. ambulates to the bathroom with a cane. awaits further eval. will continue to monitor

## 2018-07-01 NOTE — ED PROVIDER NOTE - OBJECTIVE STATEMENT
62F PMH spinal stenosis s/p laminectomy 1.5 years ago, COPD (active smoker) now p/w lightheadedness and exertional SOB and chest discomfort since waking up this morning. Asymptomatic when at rest, but when goes to ambulate becomes very symptomatic. No recent illness/fever/NVD. States her primary doctor told her there might be "something wrong with her heart arteries" but has never had a stress test or cardiac eval. Typically very active and has never had exertional symptoms before.

## 2018-07-02 ENCOUNTER — MESSAGE (OUTPATIENT)
Age: 63
End: 2018-07-02

## 2018-07-03 ENCOUNTER — TRANSCRIPTION ENCOUNTER (OUTPATIENT)
Age: 63
End: 2018-07-03

## 2018-07-24 ENCOUNTER — APPOINTMENT (OUTPATIENT)
Dept: NEUROLOGY | Facility: HOSPITAL | Age: 63
End: 2018-07-24

## 2018-07-24 ENCOUNTER — OUTPATIENT (OUTPATIENT)
Dept: OUTPATIENT SERVICES | Facility: HOSPITAL | Age: 63
LOS: 1 days | End: 2018-07-24

## 2018-07-24 VITALS
HEART RATE: 90 BPM | BODY MASS INDEX: 25.27 KG/M2 | SYSTOLIC BLOOD PRESSURE: 123 MMHG | DIASTOLIC BLOOD PRESSURE: 82 MMHG | WEIGHT: 148 LBS | HEIGHT: 64 IN

## 2018-07-24 DIAGNOSIS — Z82.8 FAMILY HISTORY OF OTHER DISABILITIES AND CHRONIC DISEASES LEADING TO DISABLEMENT, NOT ELSEWHERE CLASSIFIED: Chronic | ICD-10-CM

## 2018-07-24 DIAGNOSIS — Z98.890 OTHER SPECIFIED POSTPROCEDURAL STATES: Chronic | ICD-10-CM

## 2018-07-24 PROBLEM — M54.9 DORSALGIA, UNSPECIFIED: Chronic | Status: ACTIVE | Noted: 2017-11-06

## 2018-07-25 DIAGNOSIS — M54.9 DORSALGIA, UNSPECIFIED: ICD-10-CM

## 2018-07-26 DIAGNOSIS — Z71.89 OTHER SPECIFIED COUNSELING: ICD-10-CM

## 2018-08-21 ENCOUNTER — OUTPATIENT (OUTPATIENT)
Dept: OUTPATIENT SERVICES | Facility: HOSPITAL | Age: 63
LOS: 1 days | End: 2018-08-21

## 2018-08-21 ENCOUNTER — APPOINTMENT (OUTPATIENT)
Dept: NEUROLOGY | Facility: HOSPITAL | Age: 63
End: 2018-08-21

## 2018-08-21 VITALS
WEIGHT: 140 LBS | BODY MASS INDEX: 23.9 KG/M2 | SYSTOLIC BLOOD PRESSURE: 152 MMHG | HEART RATE: 87 BPM | HEIGHT: 64 IN | DIASTOLIC BLOOD PRESSURE: 96 MMHG

## 2018-08-21 DIAGNOSIS — G47.62 SLEEP RELATED LEG CRAMPS: ICD-10-CM

## 2018-08-21 DIAGNOSIS — M51.17 INTERVERTEBRAL DISC DISORDERS WITH RADICULOPATHY, LUMBOSACRAL REGION: ICD-10-CM

## 2018-08-21 DIAGNOSIS — Z98.890 OTHER SPECIFIED POSTPROCEDURAL STATES: Chronic | ICD-10-CM

## 2018-08-21 DIAGNOSIS — Z82.8 FAMILY HISTORY OF OTHER DISABILITIES AND CHRONIC DISEASES LEADING TO DISABLEMENT, NOT ELSEWHERE CLASSIFIED: Chronic | ICD-10-CM

## 2018-08-21 DIAGNOSIS — M48.07 SPINAL STENOSIS, LUMBOSACRAL REGION: ICD-10-CM

## 2018-08-21 DIAGNOSIS — K59.03 DRUG INDUCED CONSTIPATION: ICD-10-CM

## 2018-08-21 DIAGNOSIS — T40.2X5A DRUG INDUCED CONSTIPATION: ICD-10-CM

## 2018-08-21 RX ORDER — NALOXEGOL OXALATE 25 MG/1
25 TABLET, FILM COATED ORAL AT BEDTIME
Qty: 30 | Refills: 0 | Status: COMPLETED | COMMUNITY
Start: 2018-08-21

## 2018-08-21 RX ORDER — CYCLOBENZAPRINE HYDROCHLORIDE 10 MG/1
10 TABLET, FILM COATED ORAL
Qty: 30 | Refills: 0 | Status: DISCONTINUED | COMMUNITY
Start: 2018-01-02 | End: 2018-08-21

## 2018-08-21 RX ORDER — CYCLOBENZAPRINE HYDROCHLORIDE 10 MG/1
10 TABLET, FILM COATED ORAL
Qty: 30 | Refills: 0 | Status: DISCONTINUED | COMMUNITY
Start: 1900-01-01 | End: 2018-08-21

## 2018-09-03 PROBLEM — R60.0 BILATERAL EDEMA OF LOWER EXTREMITY: Status: RESOLVED | Noted: 2017-02-28 | Resolved: 2018-09-03

## 2018-09-25 ENCOUNTER — OUTPATIENT (OUTPATIENT)
Dept: OUTPATIENT SERVICES | Facility: HOSPITAL | Age: 63
LOS: 1 days | End: 2018-09-25

## 2018-09-25 ENCOUNTER — APPOINTMENT (OUTPATIENT)
Dept: NEUROLOGY | Facility: HOSPITAL | Age: 63
End: 2018-09-25
Payer: MEDICAID

## 2018-09-25 VITALS
HEIGHT: 64 IN | BODY MASS INDEX: 23.9 KG/M2 | DIASTOLIC BLOOD PRESSURE: 81 MMHG | WEIGHT: 140 LBS | HEART RATE: 76 BPM | SYSTOLIC BLOOD PRESSURE: 118 MMHG

## 2018-09-25 DIAGNOSIS — M54.9 DORSALGIA, UNSPECIFIED: ICD-10-CM

## 2018-09-25 DIAGNOSIS — Z98.890 OTHER SPECIFIED POSTPROCEDURAL STATES: Chronic | ICD-10-CM

## 2018-09-25 DIAGNOSIS — Z82.8 FAMILY HISTORY OF OTHER DISABILITIES AND CHRONIC DISEASES LEADING TO DISABLEMENT, NOT ELSEWHERE CLASSIFIED: Chronic | ICD-10-CM

## 2018-09-25 PROCEDURE — 99213 OFFICE O/P EST LOW 20 MIN: CPT | Mod: GC

## 2018-10-25 ENCOUNTER — TRANSCRIPTION ENCOUNTER (OUTPATIENT)
Age: 63
End: 2018-10-25

## 2018-11-21 ENCOUNTER — TRANSCRIPTION ENCOUNTER (OUTPATIENT)
Age: 63
End: 2018-11-21

## 2018-11-28 ENCOUNTER — TRANSCRIPTION ENCOUNTER (OUTPATIENT)
Age: 63
End: 2018-11-28

## 2018-12-03 ENCOUNTER — TRANSCRIPTION ENCOUNTER (OUTPATIENT)
Age: 63
End: 2018-12-03

## 2018-12-07 DIAGNOSIS — J44.9 CHRONIC OBSTRUCTIVE PULMONARY DISEASE, UNSPECIFIED: ICD-10-CM

## 2019-04-12 ENCOUNTER — TRANSCRIPTION ENCOUNTER (OUTPATIENT)
Age: 64
End: 2019-04-12

## 2019-04-12 DIAGNOSIS — F41.9 ANXIETY DISORDER, UNSPECIFIED: ICD-10-CM

## 2019-04-12 RX ORDER — DIAZEPAM 2 MG/1
2 TABLET ORAL DAILY
Qty: 30 | Refills: 0 | Status: ACTIVE | COMMUNITY
Start: 2019-04-12 | End: 1900-01-01

## 2019-11-25 ENCOUNTER — EMERGENCY (EMERGENCY)
Facility: HOSPITAL | Age: 64
LOS: 1 days | Discharge: HOME | End: 2019-11-25
Attending: EMERGENCY MEDICINE
Payer: MEDICARE

## 2019-11-25 VITALS
WEIGHT: 139.99 LBS | HEIGHT: 64 IN | SYSTOLIC BLOOD PRESSURE: 167 MMHG | TEMPERATURE: 98 F | RESPIRATION RATE: 18 BRPM | DIASTOLIC BLOOD PRESSURE: 108 MMHG | OXYGEN SATURATION: 100 % | HEART RATE: 73 BPM

## 2019-11-25 VITALS
RESPIRATION RATE: 12 BRPM | TEMPERATURE: 98 F | OXYGEN SATURATION: 100 % | DIASTOLIC BLOOD PRESSURE: 82 MMHG | SYSTOLIC BLOOD PRESSURE: 128 MMHG | HEART RATE: 71 BPM

## 2019-11-25 DIAGNOSIS — Z98.890 OTHER SPECIFIED POSTPROCEDURAL STATES: Chronic | ICD-10-CM

## 2019-11-25 DIAGNOSIS — Z82.8 FAMILY HISTORY OF OTHER DISABILITIES AND CHRONIC DISEASES LEADING TO DISABLEMENT, NOT ELSEWHERE CLASSIFIED: Chronic | ICD-10-CM

## 2019-11-25 LAB
ALBUMIN SERPL ELPH-MCNC: 4.7 G/DL — SIGNIFICANT CHANGE UP (ref 3.3–5)
ALP SERPL-CCNC: 56 U/L — SIGNIFICANT CHANGE UP (ref 40–120)
ALT FLD-CCNC: 32 U/L — SIGNIFICANT CHANGE UP (ref 10–45)
ANION GAP SERPL CALC-SCNC: 14 MMOL/L — SIGNIFICANT CHANGE UP (ref 5–17)
APTT BLD: 31.2 SEC — SIGNIFICANT CHANGE UP (ref 27.5–36.3)
AST SERPL-CCNC: 32 U/L — SIGNIFICANT CHANGE UP (ref 10–40)
BILIRUB SERPL-MCNC: 0.3 MG/DL — SIGNIFICANT CHANGE UP (ref 0.2–1.2)
BUN SERPL-MCNC: 8 MG/DL — SIGNIFICANT CHANGE UP (ref 7–23)
CALCIUM SERPL-MCNC: 10.1 MG/DL — SIGNIFICANT CHANGE UP (ref 8.4–10.5)
CHLORIDE SERPL-SCNC: 96 MMOL/L — SIGNIFICANT CHANGE UP (ref 96–108)
CO2 SERPL-SCNC: 25 MMOL/L — SIGNIFICANT CHANGE UP (ref 22–31)
CREAT SERPL-MCNC: 0.84 MG/DL — SIGNIFICANT CHANGE UP (ref 0.5–1.3)
GLUCOSE SERPL-MCNC: 90 MG/DL — SIGNIFICANT CHANGE UP (ref 70–99)
HCT VFR BLD CALC: 36.2 % — SIGNIFICANT CHANGE UP (ref 34.5–45)
HGB BLD-MCNC: 12.2 G/DL — SIGNIFICANT CHANGE UP (ref 11.5–15.5)
INR BLD: 0.95 RATIO — SIGNIFICANT CHANGE UP (ref 0.88–1.16)
MCHC RBC-ENTMCNC: 31.6 PG — SIGNIFICANT CHANGE UP (ref 27–34)
MCHC RBC-ENTMCNC: 33.7 GM/DL — SIGNIFICANT CHANGE UP (ref 32–36)
MCV RBC AUTO: 93.8 FL — SIGNIFICANT CHANGE UP (ref 80–100)
NRBC # BLD: 0 /100 WBCS — SIGNIFICANT CHANGE UP (ref 0–0)
PLATELET # BLD AUTO: 317 K/UL — SIGNIFICANT CHANGE UP (ref 150–400)
POTASSIUM SERPL-MCNC: 4.5 MMOL/L — SIGNIFICANT CHANGE UP (ref 3.5–5.3)
POTASSIUM SERPL-SCNC: 4.5 MMOL/L — SIGNIFICANT CHANGE UP (ref 3.5–5.3)
PROT SERPL-MCNC: 7.4 G/DL — SIGNIFICANT CHANGE UP (ref 6–8.3)
PROTHROM AB SERPL-ACNC: 10.9 SEC — SIGNIFICANT CHANGE UP (ref 10–12.9)
RBC # BLD: 3.86 M/UL — SIGNIFICANT CHANGE UP (ref 3.8–5.2)
RBC # FLD: 12.5 % — SIGNIFICANT CHANGE UP (ref 10.3–14.5)
SODIUM SERPL-SCNC: 135 MMOL/L — SIGNIFICANT CHANGE UP (ref 135–145)
TROPONIN T, HIGH SENSITIVITY RESULT: 23 NG/L — SIGNIFICANT CHANGE UP (ref 0–51)
TROPONIN T, HIGH SENSITIVITY RESULT: 23 NG/L — SIGNIFICANT CHANGE UP (ref 0–51)
WBC # BLD: 7.73 K/UL — SIGNIFICANT CHANGE UP (ref 3.8–10.5)
WBC # FLD AUTO: 7.73 K/UL — SIGNIFICANT CHANGE UP (ref 3.8–10.5)

## 2019-11-25 PROCEDURE — 99284 EMERGENCY DEPT VISIT MOD MDM: CPT | Mod: 25

## 2019-11-25 PROCEDURE — 71046 X-RAY EXAM CHEST 2 VIEWS: CPT | Mod: 26

## 2019-11-25 PROCEDURE — 85730 THROMBOPLASTIN TIME PARTIAL: CPT

## 2019-11-25 PROCEDURE — 71046 X-RAY EXAM CHEST 2 VIEWS: CPT

## 2019-11-25 PROCEDURE — 80053 COMPREHEN METABOLIC PANEL: CPT

## 2019-11-25 PROCEDURE — 85610 PROTHROMBIN TIME: CPT

## 2019-11-25 PROCEDURE — 84484 ASSAY OF TROPONIN QUANT: CPT

## 2019-11-25 PROCEDURE — 93010 ELECTROCARDIOGRAM REPORT: CPT

## 2019-11-25 PROCEDURE — 85027 COMPLETE CBC AUTOMATED: CPT

## 2019-11-25 PROCEDURE — 93005 ELECTROCARDIOGRAM TRACING: CPT

## 2019-11-25 PROCEDURE — 99285 EMERGENCY DEPT VISIT HI MDM: CPT

## 2019-11-25 RX ORDER — IBUPROFEN 200 MG
400 TABLET ORAL ONCE
Refills: 0 | Status: COMPLETED | OUTPATIENT
Start: 2019-11-25 | End: 2019-11-25

## 2019-11-25 RX ORDER — ACETAMINOPHEN 500 MG
650 TABLET ORAL ONCE
Refills: 0 | Status: COMPLETED | OUTPATIENT
Start: 2019-11-25 | End: 2019-11-25

## 2019-11-25 NOTE — ED PROVIDER NOTE - ATTENDING CONTRIBUTION TO CARE
MD Aviles:  patient seen and evaluated personally.   I agree with the History & Physical,  Impression & Plan other than what was detailed in my note.  MD Aviles    Pt w/ hx of copd, smoking, chronic neck pain radiating to left shoulder presenting to ed w/ cc of left scapular pain that happened abruptly while looking at her phone, she became very anxious and came into the ed. she intermittently feels lightheaded but this is not new for her. Pain is now gone, does not move anywhere. denies hx of dvt/pe, hemoptysis, cp, palpitiaotns, n/v, diaphoresis, syncope. afebrile vitals stable, non toxic, anxious appearing. does not seem consistent w/ acs however given female, age will get troponin, ekg unremarkalbe. low risk for pe.

## 2019-11-25 NOTE — ED PROVIDER NOTE - OBJECTIVE STATEMENT
64F w/ pmh COPD, smoker, spinal stenosis s/p lumbar laminectomy - p/w L lateral chest pain, assc w/ L neck pain radiating down L arm, x several wks but worse today and assc w/ L upper back pain. +baseline sob, unchanged from usual. No fever, n/v/d/c, abd pain, cough, dizziness, dysuria/hematuria. No recent travel, medication change, illness, or hospitalization.

## 2019-11-25 NOTE — ED PROVIDER NOTE - PATIENT PORTAL LINK FT
You can access the FollowMyHealth Patient Portal offered by [facility] by registering at the following website: [email or web address]. By joining [email or web address]’s FollowMyHealth portal, you will also be able to view your health information using other applications (apps) compatible with our system.

## 2019-11-25 NOTE — ED ADULT NURSE NOTE - CAS EDN DISCHARGE ASSESSMENT
Refill approved as requested.   Patient baseline mental status/Alert and oriented to person, place and time/Awake

## 2019-11-25 NOTE — ED PROVIDER NOTE - NSFOLLOWUPINSTRUCTIONS_ED_ALL_ED_FT
Please follow up with a CARDIOLOGIST in the next 1-2 weeks.    Please take ACETAMINOPHEN and IBUPROFEN as directed for your pain.

## 2019-11-25 NOTE — ED ADULT NURSE NOTE - OBJECTIVE STATEMENT
64F aaox4 ambulatory p/w c/o left shoulder pain on and off for few weeks now, radiating to left arm, also c/o epigastric pain for months now, reports taking hydromorphone and cyclobenzaprine, reports pain is radiating to the scapular area, also reports smoking cigarettes daily, h/o emphysema.  Reports taking hydromorphone 4mg around 9pm.  Lung sounds clear, abd soft, nt/nd. 5/10 for pain scale.

## 2019-11-25 NOTE — ED PROVIDER NOTE - PHYSICAL EXAMINATION
*GEN:   comfortable, in no acute distress, AOx3  *EYES:   pupils equally round and reactive to light, extra-occular movements intact  *HEENT:   airway patent, moist mucosal membranes, full ROM neck  *CV:   regular rate and rhythm  *RESP:   clear to auscultation bilaterally, non-labored  *ABD:   soft, non-tender  *:   no cva/flank tenderness  *EXTREM:   L shoulder/ upper back msk tenderness, full ROM in arm  *SKIN:   dry, intact  *NEURO:   AOx3, no focal weakness or loss of sensation

## 2019-11-25 NOTE — ED PROVIDER NOTE - CLINICAL SUMMARY MEDICAL DECISION MAKING FREE TEXT BOX
64F w/ pain in chest, arm, shoulder, back - c/w msk etiology, possible cervical stenosis, however will check ACS workup for lateral chest pain, give sx relief, reassess

## 2019-12-17 ENCOUNTER — TRANSCRIPTION ENCOUNTER (OUTPATIENT)
Age: 64
End: 2019-12-17

## 2019-12-18 ENCOUNTER — TRANSCRIPTION ENCOUNTER (OUTPATIENT)
Age: 64
End: 2019-12-18

## 2019-12-23 ENCOUNTER — TRANSCRIPTION ENCOUNTER (OUTPATIENT)
Age: 64
End: 2019-12-23

## 2019-12-26 ENCOUNTER — OUTPATIENT (OUTPATIENT)
Dept: OUTPATIENT SERVICES | Facility: HOSPITAL | Age: 64
LOS: 1 days | End: 2019-12-26
Payer: MEDICARE

## 2019-12-26 ENCOUNTER — APPOINTMENT (OUTPATIENT)
Dept: MRI IMAGING | Facility: IMAGING CENTER | Age: 64
End: 2019-12-26
Payer: MEDICARE

## 2019-12-26 DIAGNOSIS — Z00.8 ENCOUNTER FOR OTHER GENERAL EXAMINATION: ICD-10-CM

## 2019-12-26 DIAGNOSIS — Z82.8 FAMILY HISTORY OF OTHER DISABILITIES AND CHRONIC DISEASES LEADING TO DISABLEMENT, NOT ELSEWHERE CLASSIFIED: Chronic | ICD-10-CM

## 2019-12-26 DIAGNOSIS — Z98.890 OTHER SPECIFIED POSTPROCEDURAL STATES: Chronic | ICD-10-CM

## 2019-12-26 PROCEDURE — 72141 MRI NECK SPINE W/O DYE: CPT | Mod: 26

## 2019-12-26 PROCEDURE — 72141 MRI NECK SPINE W/O DYE: CPT

## 2020-01-21 ENCOUNTER — TRANSCRIPTION ENCOUNTER (OUTPATIENT)
Age: 65
End: 2020-01-21

## 2020-01-24 ENCOUNTER — TRANSCRIPTION ENCOUNTER (OUTPATIENT)
Age: 65
End: 2020-01-24

## 2020-03-21 DIAGNOSIS — Z86.69 PERSONAL HISTORY OF OTHER DISEASES OF THE NERVOUS SYSTEM AND SENSE ORGANS: ICD-10-CM

## 2020-03-23 ENCOUNTER — TRANSCRIPTION ENCOUNTER (OUTPATIENT)
Age: 65
End: 2020-03-23

## 2020-03-24 ENCOUNTER — TRANSCRIPTION ENCOUNTER (OUTPATIENT)
Age: 65
End: 2020-03-24

## 2020-04-17 ENCOUNTER — TRANSCRIPTION ENCOUNTER (OUTPATIENT)
Age: 65
End: 2020-04-17

## 2020-04-28 ENCOUNTER — TRANSCRIPTION ENCOUNTER (OUTPATIENT)
Age: 65
End: 2020-04-28

## 2020-08-13 ENCOUNTER — EMERGENCY (EMERGENCY)
Facility: HOSPITAL | Age: 65
LOS: 1 days | Discharge: LEFT BEFORE DISCHARGE | End: 2020-08-13
Attending: EMERGENCY MEDICINE
Payer: MEDICARE

## 2020-08-13 VITALS
RESPIRATION RATE: 20 BRPM | WEIGHT: 130.07 LBS | HEART RATE: 70 BPM | OXYGEN SATURATION: 100 % | DIASTOLIC BLOOD PRESSURE: 95 MMHG | HEIGHT: 64 IN | SYSTOLIC BLOOD PRESSURE: 141 MMHG | TEMPERATURE: 99 F

## 2020-08-13 VITALS
OXYGEN SATURATION: 100 % | SYSTOLIC BLOOD PRESSURE: 122 MMHG | DIASTOLIC BLOOD PRESSURE: 76 MMHG | TEMPERATURE: 98 F | RESPIRATION RATE: 12 BRPM | HEART RATE: 69 BPM

## 2020-08-13 DIAGNOSIS — Z98.890 OTHER SPECIFIED POSTPROCEDURAL STATES: Chronic | ICD-10-CM

## 2020-08-13 DIAGNOSIS — Z82.8 FAMILY HISTORY OF OTHER DISABILITIES AND CHRONIC DISEASES LEADING TO DISABLEMENT, NOT ELSEWHERE CLASSIFIED: Chronic | ICD-10-CM

## 2020-08-13 LAB
ALBUMIN SERPL ELPH-MCNC: 4.4 G/DL — SIGNIFICANT CHANGE UP (ref 3.3–5)
ALP SERPL-CCNC: 54 U/L — SIGNIFICANT CHANGE UP (ref 40–120)
ALT FLD-CCNC: 43 U/L — SIGNIFICANT CHANGE UP (ref 10–45)
ANION GAP SERPL CALC-SCNC: 12 MMOL/L — SIGNIFICANT CHANGE UP (ref 5–17)
AST SERPL-CCNC: 54 U/L — HIGH (ref 10–40)
BASOPHILS # BLD AUTO: 0.04 K/UL — SIGNIFICANT CHANGE UP (ref 0–0.2)
BASOPHILS NFR BLD AUTO: 0.6 % — SIGNIFICANT CHANGE UP (ref 0–2)
BILIRUB SERPL-MCNC: 0.4 MG/DL — SIGNIFICANT CHANGE UP (ref 0.2–1.2)
BUN SERPL-MCNC: 8 MG/DL — SIGNIFICANT CHANGE UP (ref 7–23)
CALCIUM SERPL-MCNC: 9.6 MG/DL — SIGNIFICANT CHANGE UP (ref 8.4–10.5)
CHLORIDE SERPL-SCNC: 92 MMOL/L — LOW (ref 96–108)
CO2 SERPL-SCNC: 22 MMOL/L — SIGNIFICANT CHANGE UP (ref 22–31)
CREAT SERPL-MCNC: 0.64 MG/DL — SIGNIFICANT CHANGE UP (ref 0.5–1.3)
EOSINOPHIL # BLD AUTO: 0.02 K/UL — SIGNIFICANT CHANGE UP (ref 0–0.5)
EOSINOPHIL NFR BLD AUTO: 0.3 % — SIGNIFICANT CHANGE UP (ref 0–6)
GLUCOSE SERPL-MCNC: 86 MG/DL — SIGNIFICANT CHANGE UP (ref 70–99)
HCT VFR BLD CALC: 35.5 % — SIGNIFICANT CHANGE UP (ref 34.5–45)
HGB BLD-MCNC: 12.1 G/DL — SIGNIFICANT CHANGE UP (ref 11.5–15.5)
IMM GRANULOCYTES NFR BLD AUTO: 0.3 % — SIGNIFICANT CHANGE UP (ref 0–1.5)
LIDOCAIN IGE QN: 98 U/L — HIGH (ref 7–60)
LYMPHOCYTES # BLD AUTO: 2.19 K/UL — SIGNIFICANT CHANGE UP (ref 1–3.3)
LYMPHOCYTES # BLD AUTO: 33.4 % — SIGNIFICANT CHANGE UP (ref 13–44)
MCHC RBC-ENTMCNC: 32 PG — SIGNIFICANT CHANGE UP (ref 27–34)
MCHC RBC-ENTMCNC: 34.1 GM/DL — SIGNIFICANT CHANGE UP (ref 32–36)
MCV RBC AUTO: 93.9 FL — SIGNIFICANT CHANGE UP (ref 80–100)
MONOCYTES # BLD AUTO: 0.4 K/UL — SIGNIFICANT CHANGE UP (ref 0–0.9)
MONOCYTES NFR BLD AUTO: 6.1 % — SIGNIFICANT CHANGE UP (ref 2–14)
NEUTROPHILS # BLD AUTO: 3.89 K/UL — SIGNIFICANT CHANGE UP (ref 1.8–7.4)
NEUTROPHILS NFR BLD AUTO: 59.3 % — SIGNIFICANT CHANGE UP (ref 43–77)
NRBC # BLD: 0 /100 WBCS — SIGNIFICANT CHANGE UP (ref 0–0)
OSMOLALITY SERPL: 265 MOSMOL/KG — LOW (ref 280–301)
PLATELET # BLD AUTO: 274 K/UL — SIGNIFICANT CHANGE UP (ref 150–400)
POTASSIUM SERPL-MCNC: 3.7 MMOL/L — SIGNIFICANT CHANGE UP (ref 3.5–5.3)
POTASSIUM SERPL-SCNC: 3.7 MMOL/L — SIGNIFICANT CHANGE UP (ref 3.5–5.3)
PROT SERPL-MCNC: 6.7 G/DL — SIGNIFICANT CHANGE UP (ref 6–8.3)
RBC # BLD: 3.78 M/UL — LOW (ref 3.8–5.2)
RBC # FLD: 11.6 % — SIGNIFICANT CHANGE UP (ref 10.3–14.5)
SODIUM SERPL-SCNC: 126 MMOL/L — LOW (ref 135–145)
TROPONIN T, HIGH SENSITIVITY RESULT: 16 NG/L — SIGNIFICANT CHANGE UP (ref 0–51)
TROPONIN T, HIGH SENSITIVITY RESULT: 16 NG/L — SIGNIFICANT CHANGE UP (ref 0–51)
WBC # BLD: 6.56 K/UL — SIGNIFICANT CHANGE UP (ref 3.8–10.5)
WBC # FLD AUTO: 6.56 K/UL — SIGNIFICANT CHANGE UP (ref 3.8–10.5)

## 2020-08-13 PROCEDURE — 83930 ASSAY OF BLOOD OSMOLALITY: CPT

## 2020-08-13 PROCEDURE — 80053 COMPREHEN METABOLIC PANEL: CPT

## 2020-08-13 PROCEDURE — 84484 ASSAY OF TROPONIN QUANT: CPT

## 2020-08-13 PROCEDURE — 83690 ASSAY OF LIPASE: CPT

## 2020-08-13 PROCEDURE — 71046 X-RAY EXAM CHEST 2 VIEWS: CPT

## 2020-08-13 PROCEDURE — 93005 ELECTROCARDIOGRAM TRACING: CPT

## 2020-08-13 PROCEDURE — 99284 EMERGENCY DEPT VISIT MOD MDM: CPT | Mod: 25

## 2020-08-13 PROCEDURE — 99285 EMERGENCY DEPT VISIT HI MDM: CPT

## 2020-08-13 PROCEDURE — 71046 X-RAY EXAM CHEST 2 VIEWS: CPT | Mod: 26

## 2020-08-13 PROCEDURE — 93010 ELECTROCARDIOGRAM REPORT: CPT

## 2020-08-13 PROCEDURE — 36415 COLL VENOUS BLD VENIPUNCTURE: CPT

## 2020-08-13 PROCEDURE — 85027 COMPLETE CBC AUTOMATED: CPT

## 2020-08-13 RX ORDER — ASPIRIN/CALCIUM CARB/MAGNESIUM 324 MG
325 TABLET ORAL ONCE
Refills: 0 | Status: COMPLETED | OUTPATIENT
Start: 2020-08-13 | End: 2020-08-13

## 2020-08-13 RX ORDER — FAMOTIDINE 10 MG/ML
20 INJECTION INTRAVENOUS ONCE
Refills: 0 | Status: DISCONTINUED | OUTPATIENT
Start: 2020-08-13 | End: 2020-08-17

## 2020-08-13 NOTE — ED PROVIDER NOTE - PROGRESS NOTE DETAILS
Stable trop.  Patient counseled at length that she should be admitted for hyponatremia w/u and correction.  She does not want to stay in the hospital, is afraid of covid, infections, etc.  Understands no heavy COVID burden in the hospital currently, understands my concerns re worsening hyponatremia if not corrected including sz, coma, death.  Understands these risks and is able to repeat my concerns back to me in her own words.  Is not confused or intoxicated; has full decision making capacity.  Will d/c AMA.  Will arrange for urgent medicine f/u and ED telemedicine f/u.  Given strict return precautions.  --BMM

## 2020-08-13 NOTE — ED ADULT NURSE NOTE - OBJECTIVE STATEMENT
Patient is a 65 year old female complaining of chest, l. shoulder and back pain. Arrived by [facility] from home. Patient has history of hydromorphone use for chronic back pain, non O2 dependent COPD, GERD, spinal stenosis-- denies h/o MI. Patient is A&O x 4 and appears well. Pt reports she left her house for the first time today in 5 months to do urine screen at MD office. Upon  returning home, felt very exhausted and laid on her side how she usually lays for her back pain. Today when laying in her usual position felt "excruciating chest pains". Has had episodes like this in last 2 months at home but was afraid to come to hospital due to risk of COVID. Usually manages CP at home with relaxation. Describes pain as "elephant sitting under L breast, like a flutter", and radiates to R side. Today for the first time pain radiated to back. Endorsing no complaints at this time. Pt does occasionally smoke cigarettes. + chills today. Denies complaints of sob, fevers, n/v/d, headache, syncope, burning urination, blood in urine, blood in stool. Abd is soft, non tender, non distended. Skin is warm and dry. Color is consistent with ethnicity. VSS/ NAD. Safety and comfort maintained. No visitors at the bedside. Will continue to monitor.

## 2020-08-13 NOTE — ED PROVIDER NOTE - PATIENT PORTAL LINK FT
You can access the FollowMyHealth Patient Portal offered by [facility] by registering at the following website: [email or web address]. By joining [employer]’s FollowMyHealth portal, you will also be able to view your health information using other applications (apps) compatible with our system.

## 2020-08-13 NOTE — ED PROVIDER NOTE - CLINICAL SUMMARY MEDICAL DECISION MAKING FREE TEXT BOX
CP, seems somewhat atypical though pt active smoker.  Unlikely to be dissection given chronicity of symptoms.  Doubt PE given lack of sob, normal vitals, no pleuritic component to pain.  Could be gerd given epigastric symptoms.  EKG is nonischemic.  Needs labs, CXR, ASA empirically, consider CDU v DC after delta trop.  --BMM

## 2020-08-13 NOTE — ED PROVIDER NOTE - CARE PROVIDERS DIRECT ADDRESSES
[email or web address]

## 2020-08-13 NOTE — ED PROVIDER NOTE - PHYSICAL EXAMINATION
GENERAL: AAOx4, GCS 15, NAD, WDWN; HEENT: MMM, no jugular venous distension, supple neck, PERRLA, EOMI, nonicteric sclera; PULM: CTA B, no crackles/rubs/rales; CV: RRR, S1S2, no MRG; ABD: Flat abdomen, NTND, no R/G/R, no CVAT.  MSK: RAMOS, +2 pulses x4;  NEURO: No obvious focal deficits; PSYCH: AAOx3, clear thought and normal sensorium.

## 2020-08-13 NOTE — ED PROVIDER NOTE - CARE PROVIDER_API CALL
Enriqueta Jones  INTERNAL MEDICINE  [address] [place] [address]  Phone: [phone]  Fax: [phone]  Follow Up Time: Urgent

## 2020-08-13 NOTE — ED ADULT NURSE REASSESSMENT NOTE - NS ED NURSE REASSESS COMMENT FT1
Pt would like to hold off on taking Maalox and Pepcid. Not having any abd pain or complaints at this time.

## 2020-08-13 NOTE — ED ADULT NURSE NOTE - NSIMPLEMENTINTERV_GEN_ALL_ED
Implemented All Fall Risk Interventions:  [place] to call system. Call bell, personal items and telephone within reach. Instruct patient to call for assistance. Room bathroom lighting operational. Non-slip footwear when patient is off stretcher. Physically safe environment: no spills, clutter or unnecessary equipment. Stretcher in lowest position, wheels locked, appropriate side rails in place. Provide visual cue, wrist band, yellow gown, etc. Monitor gait and stability. Monitor for mental status changes and reorient to person, place, and time. Review medications for side effects contributing to fall risk. Reinforce activity limits and safety measures with patient and family.

## 2020-08-13 NOTE — ED PROVIDER NOTE - OBJECTIVE STATEMENT
2 mon h/o CP/epigastric pain, radiating to back and around R side, then radiates to R arm.  Nonexertional.  Improved c change in position.  Occurred several times in past for ~1 hr, resolved spontaneously.  Has been experiencing several days of malaise, weakness, ?chills?.  No w/u for symptoms thus far.  No fevers.  No diaphoresis.  No SOB associated.  Today was seen by primary medical doctor for regular checkup and when returned home had episode of severe chest pain, substernal, rad to back and L arm similar to what she experienced in the past.  Currently patient has no complaints.  No cough, congestion, sob, abd pain, nvd.      No family h/o Coronary Artery Disease or early death  Takes dilaudid and valium for spinal stenosis/anxiety  Current smoker  No recreational drug use

## 2020-08-13 NOTE — ED PROVIDER NOTE - NSFOLLOWUPINSTRUCTIONS_ED_ALL_ED_FT
Follow up with Dr. Jones's office TOMORROW    Return to the ER immediately for vomiting, nausea, worsening fatigue/weakness, headache, confusion, or ANY other concerns    Make sure you stay well hydrated with ELECTROLYTE containing liquids and foods.  Limit the amount of water your drink    Continue with your medications as prescribed

## 2020-08-13 NOTE — ED PROVIDER NOTE - NSPTACCESSSVCSAPPTDETAILS_ED_ALL_ED_FT
EMERGENGY TELEMEDICINE FOLLOWUP  Diagnosis: Hyponatremia (126) without neuro symptoms.  Left AMA.  Date of Follow-up: 8/15/20    ALSO NEEDS URGENT F/U WITHE DR. TALBOT

## 2020-08-14 DIAGNOSIS — E87.1 HYPO-OSMOLALITY AND HYPONATREMIA: ICD-10-CM

## 2020-08-18 ENCOUNTER — TRANSCRIPTION ENCOUNTER (OUTPATIENT)
Age: 65
End: 2020-08-18

## 2020-08-18 LAB
ANION GAP SERPL CALC-SCNC: 11 MMOL/L
BUN SERPL-MCNC: 13 MG/DL
CALCIUM SERPL-MCNC: 9.8 MG/DL
CHLORIDE SERPL-SCNC: 102 MMOL/L
CO2 SERPL-SCNC: 26 MMOL/L
CREAT SERPL-MCNC: 0.9 MG/DL
GLUCOSE SERPL-MCNC: 91 MG/DL
POTASSIUM SERPL-SCNC: 5.1 MMOL/L
SODIUM SERPL-SCNC: 139 MMOL/L

## 2020-08-19 ENCOUNTER — TRANSCRIPTION ENCOUNTER (OUTPATIENT)
Age: 65
End: 2020-08-19

## 2020-08-21 ENCOUNTER — TRANSCRIPTION ENCOUNTER (OUTPATIENT)
Age: 65
End: 2020-08-21

## 2020-09-24 ENCOUNTER — TRANSCRIPTION ENCOUNTER (OUTPATIENT)
Age: 65
End: 2020-09-24

## 2020-09-24 RX ORDER — UMECLIDINIUM 62.5 UG/1
62.5 AEROSOL, POWDER ORAL DAILY
Qty: 1 | Refills: 6 | Status: ACTIVE | COMMUNITY
Start: 2018-12-07 | End: 1900-01-01

## 2020-12-09 ENCOUNTER — EMERGENCY (EMERGENCY)
Facility: HOSPITAL | Age: 65
LOS: 1 days | Discharge: HOME | End: 2020-12-09
Attending: EMERGENCY MEDICINE
Payer: MEDICARE

## 2020-12-09 VITALS
TEMPERATURE: 98 F | DIASTOLIC BLOOD PRESSURE: 74 MMHG | OXYGEN SATURATION: 100 % | HEART RATE: 71 BPM | SYSTOLIC BLOOD PRESSURE: 131 MMHG | RESPIRATION RATE: 20 BRPM

## 2020-12-09 VITALS
HEART RATE: 76 BPM | TEMPERATURE: 98 F | DIASTOLIC BLOOD PRESSURE: 98 MMHG | RESPIRATION RATE: 24 BRPM | OXYGEN SATURATION: 100 % | WEIGHT: 125 LBS | SYSTOLIC BLOOD PRESSURE: 147 MMHG | HEIGHT: 64 IN

## 2020-12-09 DIAGNOSIS — Z82.8 FAMILY HISTORY OF OTHER DISABILITIES AND CHRONIC DISEASES LEADING TO DISABLEMENT, NOT ELSEWHERE CLASSIFIED: Chronic | ICD-10-CM

## 2020-12-09 DIAGNOSIS — Z98.890 OTHER SPECIFIED POSTPROCEDURAL STATES: Chronic | ICD-10-CM

## 2020-12-09 LAB
ALBUMIN SERPL ELPH-MCNC: 4.7 G/DL — SIGNIFICANT CHANGE UP (ref 3.3–5)
ALP SERPL-CCNC: 62 U/L — SIGNIFICANT CHANGE UP (ref 40–120)
ALT FLD-CCNC: 24 U/L — SIGNIFICANT CHANGE UP (ref 10–45)
ANION GAP SERPL CALC-SCNC: 12 MMOL/L — SIGNIFICANT CHANGE UP (ref 5–17)
APPEARANCE UR: CLEAR — SIGNIFICANT CHANGE UP
AST SERPL-CCNC: 26 U/L — SIGNIFICANT CHANGE UP (ref 10–40)
BASE EXCESS BLDV CALC-SCNC: 0.4 MMOL/L — SIGNIFICANT CHANGE UP (ref -2–2)
BASOPHILS # BLD AUTO: 0.05 K/UL — SIGNIFICANT CHANGE UP (ref 0–0.2)
BASOPHILS NFR BLD AUTO: 0.8 % — SIGNIFICANT CHANGE UP (ref 0–2)
BILIRUB SERPL-MCNC: 0.4 MG/DL — SIGNIFICANT CHANGE UP (ref 0.2–1.2)
BILIRUB UR-MCNC: NEGATIVE — SIGNIFICANT CHANGE UP
BUN SERPL-MCNC: 14 MG/DL — SIGNIFICANT CHANGE UP (ref 7–23)
CA-I SERPL-SCNC: 1.24 MMOL/L — SIGNIFICANT CHANGE UP (ref 1.12–1.3)
CALCIUM SERPL-MCNC: 9.9 MG/DL — SIGNIFICANT CHANGE UP (ref 8.4–10.5)
CHLORIDE BLDV-SCNC: 99 MMOL/L — SIGNIFICANT CHANGE UP (ref 96–108)
CHLORIDE SERPL-SCNC: 99 MMOL/L — SIGNIFICANT CHANGE UP (ref 96–108)
CO2 BLDV-SCNC: 29 MMOL/L — SIGNIFICANT CHANGE UP (ref 22–30)
CO2 SERPL-SCNC: 25 MMOL/L — SIGNIFICANT CHANGE UP (ref 22–31)
COLOR SPEC: SIGNIFICANT CHANGE UP
CREAT SERPL-MCNC: 0.8 MG/DL — SIGNIFICANT CHANGE UP (ref 0.5–1.3)
DIFF PNL FLD: NEGATIVE — SIGNIFICANT CHANGE UP
EOSINOPHIL # BLD AUTO: 0.01 K/UL — SIGNIFICANT CHANGE UP (ref 0–0.5)
EOSINOPHIL NFR BLD AUTO: 0.2 % — SIGNIFICANT CHANGE UP (ref 0–6)
GAS PNL BLDV: 133 MMOL/L — LOW (ref 135–145)
GAS PNL BLDV: SIGNIFICANT CHANGE UP
GLUCOSE BLDV-MCNC: 107 MG/DL — HIGH (ref 70–99)
GLUCOSE SERPL-MCNC: 108 MG/DL — HIGH (ref 70–99)
GLUCOSE UR QL: NEGATIVE — SIGNIFICANT CHANGE UP
HCO3 BLDV-SCNC: 27 MMOL/L — SIGNIFICANT CHANGE UP (ref 21–29)
HCT VFR BLD CALC: 41.3 % — SIGNIFICANT CHANGE UP (ref 34.5–45)
HCT VFR BLDA CALC: 42 % — SIGNIFICANT CHANGE UP (ref 39–50)
HGB BLD CALC-MCNC: 13.8 G/DL — SIGNIFICANT CHANGE UP (ref 11.5–15.5)
HGB BLD-MCNC: 13.7 G/DL — SIGNIFICANT CHANGE UP (ref 11.5–15.5)
IMM GRANULOCYTES NFR BLD AUTO: 0.5 % — SIGNIFICANT CHANGE UP (ref 0–1.5)
KETONES UR-MCNC: NEGATIVE — SIGNIFICANT CHANGE UP
LACTATE BLDV-MCNC: 2.1 MMOL/L — HIGH (ref 0.7–2)
LEUKOCYTE ESTERASE UR-ACNC: NEGATIVE — SIGNIFICANT CHANGE UP
LYMPHOCYTES # BLD AUTO: 1.32 K/UL — SIGNIFICANT CHANGE UP (ref 1–3.3)
LYMPHOCYTES # BLD AUTO: 20.1 % — SIGNIFICANT CHANGE UP (ref 13–44)
MAGNESIUM SERPL-MCNC: 2.4 MG/DL — SIGNIFICANT CHANGE UP (ref 1.6–2.6)
MCHC RBC-ENTMCNC: 31.4 PG — SIGNIFICANT CHANGE UP (ref 27–34)
MCHC RBC-ENTMCNC: 33.2 GM/DL — SIGNIFICANT CHANGE UP (ref 32–36)
MCV RBC AUTO: 94.5 FL — SIGNIFICANT CHANGE UP (ref 80–100)
MONOCYTES # BLD AUTO: 0.48 K/UL — SIGNIFICANT CHANGE UP (ref 0–0.9)
MONOCYTES NFR BLD AUTO: 7.3 % — SIGNIFICANT CHANGE UP (ref 2–14)
NEUTROPHILS # BLD AUTO: 4.69 K/UL — SIGNIFICANT CHANGE UP (ref 1.8–7.4)
NEUTROPHILS NFR BLD AUTO: 71.1 % — SIGNIFICANT CHANGE UP (ref 43–77)
NITRITE UR-MCNC: NEGATIVE — SIGNIFICANT CHANGE UP
NRBC # BLD: 0 /100 WBCS — SIGNIFICANT CHANGE UP (ref 0–0)
PCO2 BLDV: 57 MMHG — HIGH (ref 35–50)
PH BLDV: 7.3 — LOW (ref 7.35–7.45)
PH UR: 6.5 — SIGNIFICANT CHANGE UP (ref 5–8)
PLATELET # BLD AUTO: 349 K/UL — SIGNIFICANT CHANGE UP (ref 150–400)
PO2 BLDV: 23 MMHG — LOW (ref 25–45)
POTASSIUM BLDV-SCNC: 4.2 MMOL/L — SIGNIFICANT CHANGE UP (ref 3.5–5.3)
POTASSIUM SERPL-MCNC: 4.3 MMOL/L — SIGNIFICANT CHANGE UP (ref 3.5–5.3)
POTASSIUM SERPL-SCNC: 4.3 MMOL/L — SIGNIFICANT CHANGE UP (ref 3.5–5.3)
PROT SERPL-MCNC: 7.4 G/DL — SIGNIFICANT CHANGE UP (ref 6–8.3)
PROT UR-MCNC: NEGATIVE — SIGNIFICANT CHANGE UP
RBC # BLD: 4.37 M/UL — SIGNIFICANT CHANGE UP (ref 3.8–5.2)
RBC # FLD: 12.1 % — SIGNIFICANT CHANGE UP (ref 10.3–14.5)
SAO2 % BLDV: 27 % — LOW (ref 67–88)
SARS-COV-2 RNA SPEC QL NAA+PROBE: SIGNIFICANT CHANGE UP
SODIUM SERPL-SCNC: 136 MMOL/L — SIGNIFICANT CHANGE UP (ref 135–145)
SP GR SPEC: 1.02 — SIGNIFICANT CHANGE UP (ref 1.01–1.02)
TROPONIN T, HIGH SENSITIVITY RESULT: 15 NG/L — SIGNIFICANT CHANGE UP (ref 0–51)
UROBILINOGEN FLD QL: NEGATIVE — SIGNIFICANT CHANGE UP
WBC # BLD: 6.58 K/UL — SIGNIFICANT CHANGE UP (ref 3.8–10.5)
WBC # FLD AUTO: 6.58 K/UL — SIGNIFICANT CHANGE UP (ref 3.8–10.5)

## 2020-12-09 PROCEDURE — 82947 ASSAY GLUCOSE BLOOD QUANT: CPT

## 2020-12-09 PROCEDURE — U0003: CPT

## 2020-12-09 PROCEDURE — 83690 ASSAY OF LIPASE: CPT

## 2020-12-09 PROCEDURE — 83735 ASSAY OF MAGNESIUM: CPT

## 2020-12-09 PROCEDURE — 85014 HEMATOCRIT: CPT

## 2020-12-09 PROCEDURE — 82435 ASSAY OF BLOOD CHLORIDE: CPT

## 2020-12-09 PROCEDURE — 99284 EMERGENCY DEPT VISIT MOD MDM: CPT | Mod: 25

## 2020-12-09 PROCEDURE — 80053 COMPREHEN METABOLIC PANEL: CPT

## 2020-12-09 PROCEDURE — 71045 X-RAY EXAM CHEST 1 VIEW: CPT

## 2020-12-09 PROCEDURE — 99285 EMERGENCY DEPT VISIT HI MDM: CPT

## 2020-12-09 PROCEDURE — 82803 BLOOD GASES ANY COMBINATION: CPT

## 2020-12-09 PROCEDURE — 84132 ASSAY OF SERUM POTASSIUM: CPT

## 2020-12-09 PROCEDURE — 74177 CT ABD & PELVIS W/CONTRAST: CPT | Mod: 26

## 2020-12-09 PROCEDURE — 84484 ASSAY OF TROPONIN QUANT: CPT

## 2020-12-09 PROCEDURE — 74177 CT ABD & PELVIS W/CONTRAST: CPT

## 2020-12-09 PROCEDURE — 82330 ASSAY OF CALCIUM: CPT

## 2020-12-09 PROCEDURE — 81003 URINALYSIS AUTO W/O SCOPE: CPT

## 2020-12-09 PROCEDURE — 85025 COMPLETE CBC W/AUTO DIFF WBC: CPT

## 2020-12-09 PROCEDURE — 85018 HEMOGLOBIN: CPT

## 2020-12-09 PROCEDURE — 71045 X-RAY EXAM CHEST 1 VIEW: CPT | Mod: 26

## 2020-12-09 PROCEDURE — 83605 ASSAY OF LACTIC ACID: CPT

## 2020-12-09 PROCEDURE — 84295 ASSAY OF SERUM SODIUM: CPT

## 2020-12-09 RX ORDER — SODIUM CHLORIDE 9 MG/ML
2000 INJECTION, SOLUTION INTRAVENOUS ONCE
Refills: 0 | Status: COMPLETED | OUTPATIENT
Start: 2020-12-09 | End: 2020-12-09

## 2020-12-09 RX ORDER — HYDROMORPHONE HYDROCHLORIDE 2 MG/ML
2 INJECTION INTRAMUSCULAR; INTRAVENOUS; SUBCUTANEOUS ONCE
Refills: 0 | Status: DISCONTINUED | OUTPATIENT
Start: 2020-12-09 | End: 2020-12-09

## 2020-12-09 RX ADMIN — HYDROMORPHONE HYDROCHLORIDE 2 MILLIGRAM(S): 2 INJECTION INTRAMUSCULAR; INTRAVENOUS; SUBCUTANEOUS at 15:48

## 2020-12-09 RX ADMIN — SODIUM CHLORIDE 1000 MILLILITER(S): 9 INJECTION, SOLUTION INTRAVENOUS at 15:49

## 2020-12-09 NOTE — ED PROVIDER NOTE - CARE PLAN
Assessment and plan of treatment:	65 F with hx as above presenting for weakness/fatigue x 3 weeks. Given this and hx/findings concern for malignancy vs infectious vs metabolic disturbance at this time. Plan for labs, XR, CT abdomen/pelvis.   Principal Discharge DX:	Fatigue, unspecified type  Assessment and plan of treatment:	65 F with hx as above presenting for weakness/fatigue x 3 weeks. Given this and hx/findings concern for malignancy vs infectious vs metabolic disturbance at this time. Plan for labs, XR, CT abdomen/pelvis.   Principal Discharge DX:	Fatigue, unspecified type  Assessment and plan of treatment:	65 F with hx as above presenting for weakness/fatigue x 3 weeks. Given this and hx/findings concern for malignancy vs infectious vs metabolic disturbance at this time. Plan for labs, XR, CT abdomen/pelvis.  Secondary Diagnosis:	Chest pain

## 2020-12-09 NOTE — ED ADULT NURSE REASSESSMENT NOTE - NS ED NURSE REASSESS COMMENT FT1
pt ambulated w/ cane supervised by RN, states this is her normal gait and movements. dr. chester made aware. to be discharged, family contacted.

## 2020-12-09 NOTE — ED PROVIDER NOTE - PATIENT PORTAL LINK FT
You can access the FollowMyHealth Patient Portal offered by [facility] by registering at the following website: [email or web address]. By joining [employer]’s FollowMyHealth portal, you will also be able to view your health information using other applications (apps) compatible with our system.

## 2020-12-09 NOTE — ED PROVIDER NOTE - NSFOLLOWUPINSTRUCTIONS_ED_ALL_ED_FT
You were evaluated in the Emergency Department for fatigue/weakness.  You were evaluated and examined by a physician, and you had labs, chest xray and CT scan.     Based on your evaluation: fatigue/weakness    There are no signs of emergency conditions requiring admission to the hospital on today's workup.  Based on the evaluation, a presumptive diagnosis was made, however, further evaluation may be required by your primary care physician or a specialist for a more definitive diagnosis.  Therefore, please follow-up as directed or return to the Emergency Department if your symptoms change or worsen.    We recommend that you:  1. See your primary care physician within the next 72 hours for follow up.  Bring a copy of your discharge paperwork (including any test results) to your doctor.  2. Motrin/tylenol as needed         *** Return immediately if you have worsening symptoms, or any other new/concerning symptoms. ***

## 2020-12-09 NOTE — ED PROVIDER NOTE - CLINICAL SUMMARY MEDICAL DECISION MAKING FREE TEXT BOX
65 F with hx as above presenting for weakness/fatigue x 3 weeks. Given this and hx/findings concern for malignancy vs infectious vs metabolic disturbance at this time. Plan for labs, XR, CT abdomen/pelvis.    Attending Statement: Agree with the above.  3-4 wks of progressively worsening constitutional symptoms (malaise/fatigue, weight loss, difficulty sleeping, light colored stools) with severe symptoms today, a/w L sided sharp CP.  No sob/n/v/f/c/cough/congestion/night sweats.  Recently seen by me in ED 4 mon ago with similar symptoms, found to be hyponatremic and AMA'd, stated to have good resolution of symptoms with change in diet until last month.  Exam is overall nonfocal -- appears fatigued but vss, nontoxic, ncat, rrr, lungs cta, abd soft ntnd, extremities 5/5 strength grossly, no sensory deficits, normal gait.  Small petechial rash on dorsal L forearm but no rashes otherwise.  DDx as above.  Given weight loss I am significantly c/f metastatic process and/or electrolyte derangement.  Less likely infectious but possible.  Very unlikely cardiac/ACS despite CP -- atypical presentation of pain and onset was while patient was upset/emotional today, no exertional component, sob, n/v/diaphoresis, etc.  EKG nonischemic.  Will need labs +trop, fluids, CTAP, CXR, reassess.  S/O to Dr. So pending imaging; labs resulted at time of s/o were nonactionable.  --BMM

## 2020-12-09 NOTE — ED ADULT NURSE NOTE - OBJECTIVE STATEMENT
64 y/o female PMHX, COPD, GERD, Spinal Stenosis, presents with complaints of Left shoulder pain radiating down her left arm for 2 weeks. pt denies any numbness or tingling sensation, +PMS in all extremities, FROM, no facial droop noted, pt states she has been feeling weak and stiff for the past month, pt denies any CP or SOB, breathing unlabored, chest rise symmetrical, pt denies any abdominal tenderness, denies urinary symptoms, states she has fevers on and off for the past month, pt states she takes Dilaudid 2x a day 4 mg and it has not been helping her arm pain, states her son's friend is positive for COVID, but her son and  both have a cough at home. pt states last time she was here she had a low sodium level, safety precautions in place, call bell in reach.

## 2020-12-09 NOTE — ED PROVIDER NOTE - PLAN OF CARE
65 F with hx as above presenting for weakness/fatigue x 3 weeks. Given this and hx/findings concern for malignancy vs infectious vs metabolic disturbance at this time. Plan for labs, XR, CT abdomen/pelvis.

## 2020-12-09 NOTE — ED PROVIDER NOTE - PROGRESS NOTE DETAILS
labs reviewed. Troponin negative x 2. No metabolic disturbance found. CXR nl. CT abdomen/pelvis negative. Patient now ambulatory and feeling well at this time. Patient notes she walks with a cane and has been given a cane at this time. Patient family to take patient home. Patient home and stable for follow up with PMD. Strict return precautions given.

## 2020-12-09 NOTE — ED PROVIDER NOTE - PHYSICAL EXAMINATION
· Physical Examination: PHYSICAL EXAM:   · CONSTITUTIONAL:  Appearance: ill appearing  Development: well developed.  Distress: no apparent  · Manner: appropriate for situation.  Mentation: awake, alert, oriented to person, place, time/situation  · Mood: appropriate.  Nourishment: well  · Head Shape: normal cephalic, ATRAUMATIC  · EYES: bilateral normal, no discharge, redness or evidence of any abnormality  · Nose: clear Mouth: normal mucosa  · Throat: uvula midline, no vesicles, no redness, and no oropharyngeal exudate.  · CARDIAC:  CARDIAC RHYTHM: regular  CARDIAC RATE: normal  CARDIAC PEDAL EDEMA: absent  CARDIAC JVD: non-distended bilaterally  · CARDIAC PULSES: normal bilaterally  · RESPIRATORY:  Respiratory Distress: no  Breath Sounds: normal  · Chest Exam: normal, non-tender  · Abdominal Exam: soft, nondistended, nontender  · MUSCULOSKELETAL: Spine appears normal, range of motion is not limited, no muscle or joint tenderness  · NEUROLOGICAL: Alert and oriented, no focal deficits, no motor or sensory deficits.  · SKIN: Skin normal color for race, warm, dry and intact. No evidence of rash.

## 2020-12-09 NOTE — ED ADULT NURSE NOTE - NSIMPLEMENTINTERV_GEN_ALL_ED
Implemented All Fall Risk Interventions:  [place] to call system. Call bell, personal items and telephone within reach. Instruct patient to call for assistance. Room bathroom lighting operational. Non-slip footwear when patient is off stretcher. Physically safe environment: no spills, clutter or unnecessary equipment. Stretcher in lowest position, wheels locked, appropriate side rails in place. Provide visual cue, wrist band, yellow gown, etc. Monitor gait and stability. Monitor for mental status changes and reorient to person, place, and time. Review medications for side effects contributing to fall risk. Reinforce activity limits and safety measures with patient and family.

## 2020-12-09 NOTE — ED PROVIDER NOTE - OBJECTIVE STATEMENT
The patient is a 65 year old female with hx of COPD presenting for fatigue and weakness x 3 weeks. Symptoms started suddenly at rest, have been constant and worsening today leading to inability to get out of bed, with no worsening or alleviating factors. No medications taken. Patient denies chest pain, sob, fever, chills, headache, nausea, emesis, diarrhea, abdominal pain, hematuria/dysuria or lower extremity swelling. Associated unintentional weight loss and night sweats.

## 2020-12-09 NOTE — ED PROVIDER NOTE - NSFOLLOWUPCLINICS_GEN_ALL_ED_FT
[facility] General Internal Medicine  General Internal Medicine  [address] [id number]  Phone: [phone]  Fax:   Follow Up Time: 1-3 Days

## 2020-12-09 NOTE — ED PROVIDER NOTE - NS ED ROS FT
Constitutional: No fever or chills. (+) fatigue, weakness, weight loss.   Eyes: No visual changes, eye pain or redness  HEENT: No throat pain, ear pain, nasal pain. No nose bleeding.  CV: No chest pain or lower extremity edema  Resp: No SOB no cough  GI: No abd pain. No nausea or vomiting. No diarrhea. No constipation.   : No dysuria, hematuria.   MSK: No musculoskeletal pain  Skin: No rash  Neuro: No headache. No numbness or tingling. No weakness.

## 2020-12-22 ENCOUNTER — TRANSCRIPTION ENCOUNTER (OUTPATIENT)
Age: 65
End: 2020-12-22

## 2020-12-23 ENCOUNTER — TRANSCRIPTION ENCOUNTER (OUTPATIENT)
Age: 65
End: 2020-12-23

## 2020-12-23 PROBLEM — Z86.69 HISTORY OF ACUTE OTITIS MEDIA: Status: RESOLVED | Noted: 2020-03-21 | Resolved: 2020-12-23

## 2021-01-20 DIAGNOSIS — R39.9 UNSPECIFIED SYMPTOMS AND SIGNS INVOLVING THE GENITOURINARY SYSTEM: ICD-10-CM

## 2021-02-08 ENCOUNTER — EMERGENCY (EMERGENCY)
Facility: HOSPITAL | Age: 66
LOS: 1 days | Discharge: HOME | End: 2021-02-08
Attending: EMERGENCY MEDICINE | Admitting: EMERGENCY MEDICINE
Payer: MEDICARE

## 2021-02-08 VITALS
TEMPERATURE: 98 F | SYSTOLIC BLOOD PRESSURE: 141 MMHG | HEART RATE: 82 BPM | OXYGEN SATURATION: 100 % | RESPIRATION RATE: 16 BRPM | DIASTOLIC BLOOD PRESSURE: 94 MMHG

## 2021-02-08 VITALS
RESPIRATION RATE: 16 BRPM | HEIGHT: 64 IN | HEART RATE: 90 BPM | DIASTOLIC BLOOD PRESSURE: 94 MMHG | SYSTOLIC BLOOD PRESSURE: 119 MMHG | TEMPERATURE: 98 F | OXYGEN SATURATION: 100 %

## 2021-02-08 DIAGNOSIS — Z82.8 FAMILY HISTORY OF OTHER DISABILITIES AND CHRONIC DISEASES LEADING TO DISABLEMENT, NOT ELSEWHERE CLASSIFIED: Chronic | ICD-10-CM

## 2021-02-08 DIAGNOSIS — F29 UNSPECIFIED PSYCHOSIS NOT DUE TO A SUBSTANCE OR KNOWN PHYSIOLOGICAL CONDITION: ICD-10-CM

## 2021-02-08 DIAGNOSIS — Z98.890 OTHER SPECIFIED POSTPROCEDURAL STATES: Chronic | ICD-10-CM

## 2021-02-08 LAB
ALBUMIN SERPL ELPH-MCNC: 4.8 G/DL — SIGNIFICANT CHANGE UP (ref 3.3–5)
ALP SERPL-CCNC: 54 U/L — SIGNIFICANT CHANGE UP (ref 40–120)
ALT FLD-CCNC: 24 U/L — SIGNIFICANT CHANGE UP (ref 4–33)
ANION GAP SERPL CALC-SCNC: 10 MMOL/L — SIGNIFICANT CHANGE UP (ref 7–14)
APAP SERPL-MCNC: <15 UG/ML — SIGNIFICANT CHANGE UP (ref 15–25)
AST SERPL-CCNC: 20 U/L — SIGNIFICANT CHANGE UP (ref 4–32)
BASOPHILS # BLD AUTO: 0.03 K/UL — SIGNIFICANT CHANGE UP (ref 0–0.2)
BASOPHILS NFR BLD AUTO: 0.5 % — SIGNIFICANT CHANGE UP (ref 0–2)
BILIRUB SERPL-MCNC: 0.5 MG/DL — SIGNIFICANT CHANGE UP (ref 0.2–1.2)
BUN SERPL-MCNC: 27 MG/DL — HIGH (ref 7–23)
CALCIUM SERPL-MCNC: 9.9 MG/DL — SIGNIFICANT CHANGE UP (ref 8.4–10.5)
CHLORIDE SERPL-SCNC: 102 MMOL/L — SIGNIFICANT CHANGE UP (ref 98–107)
CO2 SERPL-SCNC: 27 MMOL/L — SIGNIFICANT CHANGE UP (ref 22–31)
CREAT SERPL-MCNC: 1.05 MG/DL — SIGNIFICANT CHANGE UP (ref 0.5–1.3)
EOSINOPHIL # BLD AUTO: 0 K/UL — SIGNIFICANT CHANGE UP (ref 0–0.5)
EOSINOPHIL NFR BLD AUTO: 0 % — SIGNIFICANT CHANGE UP (ref 0–6)
ETHANOL SERPL-MCNC: <10 MG/DL — SIGNIFICANT CHANGE UP
GLUCOSE SERPL-MCNC: 114 MG/DL — HIGH (ref 70–99)
HCT VFR BLD CALC: 44.1 % — SIGNIFICANT CHANGE UP (ref 34.5–45)
HGB BLD-MCNC: 14 G/DL — SIGNIFICANT CHANGE UP (ref 11.5–15.5)
IANC: 4.63 K/UL — SIGNIFICANT CHANGE UP (ref 1.5–8.5)
IMM GRANULOCYTES NFR BLD AUTO: 0.6 % — SIGNIFICANT CHANGE UP (ref 0–1.5)
LYMPHOCYTES # BLD AUTO: 1.49 K/UL — SIGNIFICANT CHANGE UP (ref 1–3.3)
LYMPHOCYTES # BLD AUTO: 22.7 % — SIGNIFICANT CHANGE UP (ref 13–44)
MCHC RBC-ENTMCNC: 30.1 PG — SIGNIFICANT CHANGE UP (ref 27–34)
MCHC RBC-ENTMCNC: 31.7 GM/DL — LOW (ref 32–36)
MCV RBC AUTO: 94.8 FL — SIGNIFICANT CHANGE UP (ref 80–100)
MONOCYTES # BLD AUTO: 0.36 K/UL — SIGNIFICANT CHANGE UP (ref 0–0.9)
MONOCYTES NFR BLD AUTO: 5.5 % — SIGNIFICANT CHANGE UP (ref 2–14)
NEUTROPHILS # BLD AUTO: 4.63 K/UL — SIGNIFICANT CHANGE UP (ref 1.8–7.4)
NEUTROPHILS NFR BLD AUTO: 70.7 % — SIGNIFICANT CHANGE UP (ref 43–77)
NRBC # BLD: 0 /100 WBCS — SIGNIFICANT CHANGE UP
NRBC # FLD: 0 K/UL — SIGNIFICANT CHANGE UP
PLATELET # BLD AUTO: 312 K/UL — SIGNIFICANT CHANGE UP (ref 150–400)
POTASSIUM SERPL-MCNC: 4.7 MMOL/L — SIGNIFICANT CHANGE UP (ref 3.5–5.3)
POTASSIUM SERPL-SCNC: 4.7 MMOL/L — SIGNIFICANT CHANGE UP (ref 3.5–5.3)
PROT SERPL-MCNC: 7.3 G/DL — SIGNIFICANT CHANGE UP (ref 6–8.3)
RBC # BLD: 4.65 M/UL — SIGNIFICANT CHANGE UP (ref 3.8–5.2)
RBC # FLD: 11.9 % — SIGNIFICANT CHANGE UP (ref 10.3–14.5)
SALICYLATES SERPL-MCNC: <5 MG/DL — LOW (ref 15–30)
SODIUM SERPL-SCNC: 139 MMOL/L — SIGNIFICANT CHANGE UP (ref 135–145)
TOXICOLOGY SCREEN, DRUGS OF ABUSE, SERUM RESULT: SIGNIFICANT CHANGE UP
TSH SERPL-MCNC: 0.67 UIU/ML — SIGNIFICANT CHANGE UP (ref 0.27–4.2)
WBC # BLD: 6.55 K/UL — SIGNIFICANT CHANGE UP (ref 3.8–10.5)
WBC # FLD AUTO: 6.55 K/UL — SIGNIFICANT CHANGE UP (ref 3.8–10.5)

## 2021-02-08 PROCEDURE — 99284 EMERGENCY DEPT VISIT MOD MDM: CPT

## 2021-02-08 PROCEDURE — 90792 PSYCH DIAG EVAL W/MED SRVCS: CPT

## 2021-02-08 NOTE — ED PROVIDER NOTE - OBJECTIVE STATEMENT
64 y/o F pmh anxiety, depression presents for psychiatric evaluation-pt experiencing a/v hallucinations.  Arrives via for psych evaluation.  Pt hearing command voices off and on.  Endorses that the voices are telling "me to take four showers in a row".  EMS endorses that pt's  "couldn't open the door" and pt was in the bathroom for 4 hours.  Pt endorses "not eating, I'm scared of the food, I think it's poisoned".  Pt endorses weight loss, states "this has been going on for a while".  Pt unable to quantify how long.  Pt stopped taking the Valium, everything started after stopping the Valium. 66 y/o F pmh anxiety, depression presents for psychiatric evaluation-pt experiencing a/v hallucinations. Pt reports she experiencing these hallucinations for months. The voices she hears tell her what to do. Visually she sees bugs crawling. Pt also paranoid afraid she be poisoned. Pt reports being on valium for 25 years and stopped about 2 weeks. 66 y/o F pmh anxiety, depression presents for psychiatric evaluation-pt experiencing a/v hallucinations. Pt reports she experiencing these hallucinations for months. The voices she hears tell her what to do. Visually she sees bugs crawling. Pt also paranoid afraid she be poisoned. Pt reports being on valium for 25 years and stopped about 2 weeks. Pt has no other complaints. Denies SI/HI, desire to harm self/others, fever, chills, cp, sob, abd pain, n/v/d.

## 2021-02-08 NOTE — ED BEHAVIORAL HEALTH ASSESSMENT NOTE - OTHER
does not appear to be internally stimulated now easily distracted slow, not broad based KIM PRASAD STOP Reference #: [id number] - prescribed last 01/05/2021 and filled on 01/05/2021 with diazepam 2 mg x 90 tablets for 30 days by Emi Abreu [facility] kya and hortencia looking intact 3/3 items in 5 mins by hx: impaired "I'm ok" confabulatory spouse

## 2021-02-08 NOTE — ED BEHAVIORAL HEALTH ASSESSMENT NOTE - DETAILS
no hx of SA nor any self injurious behaviors Father and a brother both has hx of OCD, anxiety; denied any hx of in-Pt psych admissions nor family hx of SA.  mother  from complications brought about by brain tumour voices telling her to get dressed, shower spouse updated re: dispo; discussed with RACHELL Delarosa adviced to call 911 or come to the nearest ED should symptoms worsen; have increasing bouts of agitation/aggressive behavior; having SI/HI; or call [phone]

## 2021-02-08 NOTE — ED PROVIDER NOTE - NSFOLLOWUPINSTRUCTIONS_ED_ALL_ED_FT
You were seen in the Emergency Room and evaluated for any life-threatening conditions by the medical and psychiatry teams.     After our evaluation, we have determined you can be discharged to go home.  Please return to the Emergency Room if your symptoms change or worsen    If you are feeling suicidal, homicidal, depressed, feel that you are hallucinating, or need to talk to someone about your mental health, CALL OUR CR[facility]IS [facility]ENTER [phone]

## 2021-02-08 NOTE — ED BEHAVIORAL HEALTH ASSESSMENT NOTE - HPI (INCLUDE ILLNESS QUALITY, SEVERITY, DURATION, TIMING, CONTEXT, MODIFYING FACTORS, ASSOCIATED SIGNS AND SYMPTOMS)
The Pt is a 65 yr old  female, , domiciled and retired.  Has past hx of depression and anxiety; 1 remote hx of in-Pt psych hospitalization at [facility] in her 20's due to worsening depression and being suicidal (preceded by her fiance killed in a motorcycle accident).  Claims that during that admission, reportedly also went on a drug binge (cocaine + alcohol). Pt denied any other prior in-Pt psych admissions.  Denied hx of SA nor any self injurious behaviors.  reportedly has been sober with cocaine + alcohol for decades now.  Pertinent medical issues include: COPD, GERD and hx of valium + alcohol abuse s/p intubation back in 7/2010.  Today, presented to the ED BIB EMS after  activated 911 due to symptoms of psychosis (AH, paranoia, bizarre behavior by showering for 4 hrs).      Pt is seen bedside.  Appeared calm and cooperative. Is not internally preoccupied.  Claims that she has been hearing voices of people around her (within the apartment complex that she is living in) lately. describes this as hearing neighbors' voices telling her to take a shower or get dressed.  Sometimes, she claims that she would not follow what these voices are telling her because she "does not want anyone taking control of her".  At times, she claimed that she believes they are talking about her but is unable to discern what they are talking about - "its hard to explain".  With regards to onset of symptoms, she does not recall.  Currently ,denied experiencing any perceptual disturbances.  Pt denied any symptoms of thought insertion/ withdrawal/ broadcasting.  Along with the neighbors' voices, claimed that she suspects that neighbors are "wrecking things in her house".  She also alludes towards things "disappearing in her house".  She made no attempts to report this to the police.  She does admit that lately, she has been getting "confused" and "misplacing things".  Pt manages her bank accounts, pays bills, etc.      Whilst she claims feeling sad and anxious at times, she denied any changes in her sleeping pattern nor eating habits.  Denied feeling hopeless/ helpless/ worthless.  no suicidal or homicidal thoughts.  Denied experiencing any vegetative symptoms nor symptoms suggestive of a worsening MDD.  Sadness also reportedly has been accompanied by anxiety.  However, denied having any specific anxiety disorder symptoms.  Denied any hypomanic/ manic symptoms.      Collateral information was obtained from her  who reported that Pt has had decreased oral intake for the past 2-3 weeks now; has also been showering for around 4 hrs because Pt thinks that neighbors are going to come and get her.    added that for the month, Pt thinks that "bugs are biting her".  Pt has paranoia - thinks everyone is out to get her.   Allegedly, Pt fell at home x 2 days ago.  Pt told  that a "ghost threw her on the floor".    also reported that Pt has previously left the stove on.  All said symptoms of psychosis ONLY OCCURRED X 1 MONTH AGO.  Previously,  claimed that overall, the Pt was well.

## 2021-02-08 NOTE — ED ADULT NURSE NOTE - OBJECTIVE STATEMENT
Pt bib ems activated by . EMS reports  states she decompensating mentally. Not eating, sleeping well. Endorsing vh and ah. Denies SI, HI, etoh, drugs alcohol.

## 2021-02-08 NOTE — ED PROVIDER NOTE - PROGRESS NOTE DETAILS
Dr. Ramos Eng DO (ED ATTENDING): patient with no acute abnormalities on ua, labs, or imaging, medically cleared at this time and deemed stable for d/c home by psych team. pending transportation to go home at this time

## 2021-02-08 NOTE — ED BEHAVIORAL HEALTH NOTE - BEHAVIORAL HEALTH NOTE
Worker called patient’s spouse Pino Jung ([phone]) for collateral information. All information is as per Mr. Jung:    Patient is a 65 year old female, domiciled with  and her 27 year old son, on disability, with no past psychiatric hospitalizations, BIBEMS activated by spouse for paranoia and decompensation. Mr. Jung states that he activated ems because the patient is worsening and is not functioning at her baseline. He states that the patient is not safe in the house and she has not been eating for the past 2-3 weeks. He states that the patient is showering for 4 hours because she thinks that " they" are going to come and get her. For the past month the patient thinks that bugs are biting her and has been showering for hours. Spouse states that the patient lint rolls all her clothing. Spouse states that the patient has not left the apartment in over 5 months. He states that the patient is not in touch with reality and was taking Valium 10mg but her new psychiatrist is slowly taking her off this and reduced it to 7mg. Spouse states that the patient was taking Valium for 25-30 years and she decided to cut off the medication completely 2 weeks ago. Spouse reports that the patient was also taking Diaudid for pain and also stopped taking this. Spouse states that the patient was seeing a psychiatrist but she retired and now the patient is seeing someone new. Spouse is unsure who the patient is connected to.  He states that the patient never presented this badly. He states that the patient lost a tremendous amount of weight and has been talking to herself and shaking. He states that the patient chronically has difficulty sleeping but this has worsened where now she is sleeping 1-2 hours a night for the past month. He states that the patient is not presenting with SI. He states that the patient thinks everyone is out to get her. Pt fell 2 days ago and told him that a ghost threw her on the floor. He states that the patient is responding to voices and thinks that the voices is coming to get her. He states that the patient is not eating because she thinks that everyone poisoned her food. He states that the patient is not violent or aggressive because she does not have the energy. Mr. Jung states that the patient is not using any drugs or alcohol. He states that the patient has no access to guns or weapons. He states that the patient has no covid-19 exposure and was tested negative within the last couple of months. Case discussed with RACHELL Delarosa.

## 2021-02-08 NOTE — ED ADULT TRIAGE NOTE - CHIEF COMPLAINT QUOTE
Arrives via for psych evaluation.  Pt hearing command voices off and on.  Endorses that the voices are telling "me to take four showers in a row".  EMS endorses that pt's  "couldn't open the door" and pt was in the bathroom for 4 hours.  Pt endorses "not eating, I'm scared of the food, I think it's poisoned".  Pt endorses weight loss, states "this has been going on for a while".  Pt unable to quantify how long.  Pt stopped taking the Valium, everything started after stopping the Valium.

## 2021-02-08 NOTE — ED ADULT NURSE REASSESSMENT NOTE - NS ED NURSE REASSESS COMMENT FT1
Pt is currently awake, a&ox4, calm. Pt ambulating with assistance due to unsteady gait at baseline. Unable to obtain urine specimen via straight cath possibly due to dehydration. RACHELL Reyes made aware, PO fluids encouraged as ordered. Pending UA collection and CThead. Will continue to monitor for safety.

## 2021-02-08 NOTE — ED BEHAVIORAL HEALTH ASSESSMENT NOTE - SUMMARY
65/F with remote hx of depression and anxiety; 1 remote hx of in-Pt psych hospitalization at [facility] in her 20's due to worsening depression and being suicidal; Denied hx of SA/ no self injurious behaviors.  Has reportedly been sober with cocaine + alcohol for decades now.  Today, presented to the ED BIB EMS after  activated 911 due to symptoms of psychosis (AH, paranoia, bizarre behavior by showering for 4 hrs).      At this time, though presenting with illogicality in thought process and having delusions (referential/ paranoia), said psychotic symptoms is not attributable to a primary psychotic disorder - given acuteness of the presentation (1 month ago only) and Pt having no established past hx of psychosis.  An organic cause needs to be excluded including an evolving dementia as well as hyperactive delirium which can  initially present with psychotic symptoms.   Since her  ED arrival, the Pt has been calm and cooperative.  There has been no agitation/aggressive behavior.  No verbalization of active/ passive SI/HI.   There are no signs/symptoms of severe MDD, acute mariana or florid psychosis (though she is manifesting with illogicality and having paranoia).   She is not showing any signs/symptoms of intoxication or withdrawal.  Pt is currently not delirious.  She is preoccupied with discharge but has not tested limits.. Has maintained appropriate boundaries. Pt has been easily redirected.  Overall, there has been no management issues.   Pt was offered voluntary admission but she refused.  She does not currently meet criteria for involuntary admission.      RECOMMENDATIONS:   1. may be discharged back to the community once she is medically cleared.   2.  Psychoeducation provided.  Encouraged follow up with OP psych services FOR further evaluation and management of her psychotic symptoms.  No indication for emergent psych meds at this time. Also discussed role of psychotherapy.    3. Emergency protocol reviewed.  Pt and  were adviced to call 911 or come to the nearest ED should symptoms worsen; have increasing bouts of agitation/aggressive behavior; having SI/HI; or call [phone]    4. given resources to the community like [facility] walk in Crisis centre/ Geropsych clinic, other OP psych clinics within Pt's community

## 2021-02-08 NOTE — ED BEHAVIORAL HEALTH ASSESSMENT NOTE - OTHER PAST PSYCHIATRIC HISTORY (INCLUDE DETAILS REGARDING ONSET, COURSE OF ILLNESS, INPATIENT/OUTPATIENT TREATMENT)
past hx of depression and anxiety  1 remote hx of in-Pt psych admission during her 20's at [facility] due to worsening depression and SI  no current out-Pt psychiatrist/ therapist

## 2021-02-08 NOTE — ED BEHAVIORAL HEALTH ASSESSMENT NOTE - NSSUICPROTFACT_PSY_ALL_CORE
Responsibility to children, family, or others/Identifies reasons for living/Supportive social network of family or friends/Fear of death or the actual act of killing self/Positive therapeutic relationships/Mandaeism beliefs

## 2021-02-08 NOTE — ED PROVIDER NOTE - CLINICAL SUMMARY MEDICAL DECISION MAKING FREE TEXT BOX
66 y/o F pmh anxiety, depression presents for psychiatric evaluation-pt experiencing a/v hallucinations. Pt reports she experiencing these hallucinations for months. The voices she hears tell her what to do. Visually she sees bugs crawling. Pt also paranoid afraid she be poisoned. Pt reports being on valium for 25 years and stopped about 2 weeks. Pt has no other complaints. Denies SI/HI, desire to harm self/others, fever, chills, cp, sob, abd pain, n/v/d.    SW obtained collateral from pt's -see note  pt with a/v hallucinations, and decompensating, will likely need admission and r/o infection  -preadmission labs

## 2021-02-08 NOTE — ED BEHAVIORAL HEALTH ASSESSMENT NOTE - DESCRIPTION
Since her  ED arrival, the Pt has been calm and cooperative.  There has been no agitation/aggressive behavior.  No verbalization of active/ passive SI/HI.   There are no signs/symptoms of severe MDD, acute mariana or florid psychosis.   She is not showing any signs/symptoms of intoxication or withdrawal.  Pt is currently not delirious.  She is preoccupied with discharge but has not tested limits.. Has maintained appropriate boundaries. Pt has been easily redirected.  Overall, there has been no management issues.      Vital Signs Last 24 Hrs  T(C): 36.6 (08 Feb 2021 18:11), Max: 36.6 (08 Feb 2021 18:11)  T(F): 97.9 (08 Feb 2021 18:11), Max: 97.9 (08 Feb 2021 18:11)  HR: 90 (08 Feb 2021 18:11) (90 - 90)  BP: 119/94 (08 Feb 2021 18:11) (119/94 - 119/94)  BP(mean): --  RR: 16 (08 Feb 2021 18:11) (16 - 16)  SpO2: 100% (08 Feb 2021 18:11) (100% - 100%) COPD, GERD and hx of valium + alcohol abuse s/p intubation (7/2010)  twice, currently claims to be  with a 38 yr old man for the past 9-10 yrs now; she met this current  at a [employer] (wherein she was working there).  Pt has a 29 yr old son from her previous marriage.  previously also worked as a  at [employer] before transitioning to being a  for 17-18 yrs. likes [employer], walking and cycling.  is a Spiritism. COPD, GERD, hx of back pain and hx of valium + alcohol abuse s/p intubation (7/2010)

## 2021-02-08 NOTE — ED BEHAVIORAL HEALTH ASSESSMENT NOTE - SAFETY PLAN ADDT'L DETAILS
Safety plan discussed with.../Education provided regarding environmental safety / lethal means restriction/Provision of National Suicide Prevention Lifeline 8-750-333-FTBP (5617)

## 2021-02-08 NOTE — ED PROVIDER NOTE - PATIENT PORTAL LINK FT
You can access the FollowMyHealth Patient Portal offered by [facility] by registering at the following website: [email or web address]. By joining [employer]’s FollowMyHealth portal, you will also be able to view your health information using other applications (apps) compatible with our system.

## 2021-02-08 NOTE — ED BEHAVIORAL HEALTH ASSESSMENT NOTE - RISK ASSESSMENT
RISK ASSESSMENT:   Modifiable risk factors: psychosis (AH, paranoia)  Unmodifiable risk factors: prior hx of depression and anxiety, treatment noncompliance, family hx of OCD and anxiety, prior hx of cocaine + alcohol but has been sober for decades now, hx of chronic back pain  Protective factors: domiciled with son/ ,  is supportive, no hx of SA, no access to guns, not acutely manic or floridly psychotic, no objective findings of acute suicidality or homicidality, no hx of violence, not intoxicated or in withdrawal      Given above, the Pt is currently at low acute suicide risk but is at chronically elevated risk of self-harm.  Apart from the most current stressor (symptom of psychosis) precipitating and perpetuating Pt's current symptoms, there is no identifiable acute increase in risk that would be mitigated by an involuntary psychiatric admission.  The Pt was offered voluntary admission to in-patient unit but she declined. Low Acute Suicide Risk

## 2021-02-09 LAB
AMPHET UR-MCNC: NEGATIVE — SIGNIFICANT CHANGE UP
APPEARANCE UR: CLEAR — SIGNIFICANT CHANGE UP
BACTERIA # UR AUTO: NEGATIVE — SIGNIFICANT CHANGE UP
BARBITURATES UR SCN-MCNC: NEGATIVE — SIGNIFICANT CHANGE UP
BENZODIAZ UR-MCNC: NEGATIVE — SIGNIFICANT CHANGE UP
BILIRUB UR-MCNC: NEGATIVE — SIGNIFICANT CHANGE UP
COCAINE METAB.OTHER UR-MCNC: NEGATIVE — SIGNIFICANT CHANGE UP
COLOR SPEC: YELLOW — SIGNIFICANT CHANGE UP
CREATININE URINE RESULT, DAU: 197 MG/DL — SIGNIFICANT CHANGE UP
DIFF PNL FLD: NEGATIVE — SIGNIFICANT CHANGE UP
EPI CELLS # UR: SIGNIFICANT CHANGE UP /HPF (ref 0–5)
GLUCOSE UR QL: NEGATIVE — SIGNIFICANT CHANGE UP
HYALINE CASTS # UR AUTO: SIGNIFICANT CHANGE UP /LPF (ref 0–7)
KETONES UR-MCNC: ABNORMAL
LEUKOCYTE ESTERASE UR-ACNC: NEGATIVE — SIGNIFICANT CHANGE UP
METHADONE UR-MCNC: NEGATIVE — SIGNIFICANT CHANGE UP
NITRITE UR-MCNC: NEGATIVE — SIGNIFICANT CHANGE UP
OPIATES UR-MCNC: NEGATIVE — SIGNIFICANT CHANGE UP
OXYCODONE UR-MCNC: NEGATIVE — SIGNIFICANT CHANGE UP
PCP SPEC-MCNC: SIGNIFICANT CHANGE UP
PCP UR-MCNC: NEGATIVE — SIGNIFICANT CHANGE UP
PH UR: 6 — SIGNIFICANT CHANGE UP (ref 5–8)
PROT UR-MCNC: ABNORMAL
RBC CASTS # UR COMP ASSIST: <5 /HPF — HIGH (ref 0–4)
SARS-COV-2 RNA SPEC QL NAA+PROBE: SIGNIFICANT CHANGE UP
SP GR SPEC: 1.03 — HIGH (ref 1.01–1.02)
THC UR QL: NEGATIVE — SIGNIFICANT CHANGE UP
UROBILINOGEN FLD QL: SIGNIFICANT CHANGE UP
WBC UR QL: <5 /HPF — SIGNIFICANT CHANGE UP (ref 0–5)

## 2021-02-09 PROCEDURE — 70450 CT HEAD/BRAIN W/O DYE: CPT | Mod: 26

## 2021-02-09 NOTE — ED BEHAVIORAL HEALTH NOTE - BEHAVIORAL HEALTH NOTE
SW informed by provider that pt is ready for d/c; pt needs transportation home.  Pt reports she travels via car; states her son drives her to appointments, however he is not available to pick her up at this time.  DEISY arranged taxi transport via MAS-assigned taxi provider is [facility] [employer]; invoice # [id number]  [phone]. DEISY informed by provider that pt is ready for d/c; pt needs transportation home.  Pt reports she travels via car; states her son drives her to appointments, however he is not available to pick her up at this time.  DEISY arranged taxi transport via MAS-assigned taxi provider is [facility]; invoice # [id number]  [phone].    Addendum:  Pt reports she is not sure of her  will be home; states she is nervous about taking a cab home.  DEISY contacted pt's , Pino Jung, at [phone] re: d/c.  Pt's  reports he will come to the hospital to  pt.

## 2021-02-09 NOTE — ED ADULT NURSE REASSESSMENT NOTE - NS ED NURSE REASSESS COMMENT FT1
Received pt from RN break coverage. Pt is awake, alert at baseline mental status. Pt denies s/i h/i. VSS. MC for dc. DC instructions provided.

## 2021-02-09 NOTE — ED ADULT NURSE REASSESSMENT NOTE - NS ED NURSE REASSESS COMMENT FT1
nolvia to arrive at 6am, pt promoted to get dressed because extremely anxious, tremulous, expressing many concerns about getting home safely. pt almost fell when ambulating with her own cane. assisted back to bed. MD Eng notified, to come see pt. DEISY Alexander contacted. i to arrive at 6am, pt promoted to get dressed and became extremely anxious, tremulous, expressing many concerns about getting home safely. pt almost fell when ambulating with her own cane. assisted back to bed. MD Eng notified, to come see pt. DEISY Alexander contacted.

## 2021-02-09 NOTE — ED ADULT NURSE REASSESSMENT NOTE - NS ED NURSE REASSESS COMMENT FT1
break coverage RN: pt awake and alert, calm and cooperative. DEISY galo at bedside to arrange transport for d/c.

## 2021-02-09 NOTE — ED ADULT NURSE REASSESSMENT NOTE - NS ED NURSE REASSESS COMMENT FT1
pt is laying in bed, NAD, even unlabored respirations observed. Pending CThead results and MC for dispo. Will continue to monitor for safety.

## 2021-02-10 ENCOUNTER — NON-APPOINTMENT (OUTPATIENT)
Age: 66
End: 2021-02-10

## 2021-02-10 ENCOUNTER — INPATIENT (INPATIENT)
Facility: HOSPITAL | Age: 66
LOS: 5 days | Discharge: HOME | DRG: 897 | End: 2021-02-16
Attending: STUDENT IN AN ORGANIZED HEALTH CARE EDUCATION/TRAINING PROGRAM | Admitting: HOSPITALIST
Payer: MEDICARE

## 2021-02-10 VITALS
HEIGHT: 64 IN | DIASTOLIC BLOOD PRESSURE: 86 MMHG | OXYGEN SATURATION: 100 % | RESPIRATION RATE: 22 BRPM | HEART RATE: 104 BPM | WEIGHT: 110.01 LBS | TEMPERATURE: 99 F | SYSTOLIC BLOOD PRESSURE: 124 MMHG

## 2021-02-10 DIAGNOSIS — F13.239 SEDATIVE, HYPNOTIC OR ANXIOLYTIC DEPENDENCE WITH WITHDRAWAL, UNSPECIFIED: ICD-10-CM

## 2021-02-10 DIAGNOSIS — Z98.890 OTHER SPECIFIED POSTPROCEDURAL STATES: Chronic | ICD-10-CM

## 2021-02-10 DIAGNOSIS — Z82.8 FAMILY HISTORY OF OTHER DISABILITIES AND CHRONIC DISEASES LEADING TO DISABLEMENT, NOT ELSEWHERE CLASSIFIED: Chronic | ICD-10-CM

## 2021-02-10 LAB
ALBUMIN SERPL ELPH-MCNC: 4.4 G/DL — SIGNIFICANT CHANGE UP (ref 3.3–5)
ALP SERPL-CCNC: 53 U/L — SIGNIFICANT CHANGE UP (ref 40–120)
ALT FLD-CCNC: 23 U/L — SIGNIFICANT CHANGE UP (ref 10–45)
ANION GAP SERPL CALC-SCNC: 15 MMOL/L — SIGNIFICANT CHANGE UP (ref 5–17)
APAP SERPL-MCNC: <15 UG/ML — SIGNIFICANT CHANGE UP (ref 10–30)
AST SERPL-CCNC: 31 U/L — SIGNIFICANT CHANGE UP (ref 10–40)
BASOPHILS # BLD AUTO: 0.04 K/UL — SIGNIFICANT CHANGE UP (ref 0–0.2)
BASOPHILS NFR BLD AUTO: 0.5 % — SIGNIFICANT CHANGE UP (ref 0–2)
BILIRUB SERPL-MCNC: 0.8 MG/DL — SIGNIFICANT CHANGE UP (ref 0.2–1.2)
BUN SERPL-MCNC: 30 MG/DL — HIGH (ref 7–23)
CALCIUM SERPL-MCNC: 9.4 MG/DL — SIGNIFICANT CHANGE UP (ref 8.4–10.5)
CHLORIDE SERPL-SCNC: 99 MMOL/L — SIGNIFICANT CHANGE UP (ref 96–108)
CO2 SERPL-SCNC: 23 MMOL/L — SIGNIFICANT CHANGE UP (ref 22–31)
CREAT SERPL-MCNC: 1.05 MG/DL — SIGNIFICANT CHANGE UP (ref 0.5–1.3)
EOSINOPHIL # BLD AUTO: 0 K/UL — SIGNIFICANT CHANGE UP (ref 0–0.5)
EOSINOPHIL NFR BLD AUTO: 0 % — SIGNIFICANT CHANGE UP (ref 0–6)
ETHANOL SERPL-MCNC: SIGNIFICANT CHANGE UP MG/DL (ref 0–10)
GLUCOSE SERPL-MCNC: 89 MG/DL — SIGNIFICANT CHANGE UP (ref 70–99)
HCT VFR BLD CALC: 42.6 % — SIGNIFICANT CHANGE UP (ref 34.5–45)
HGB BLD-MCNC: 14.1 G/DL — SIGNIFICANT CHANGE UP (ref 11.5–15.5)
IMM GRANULOCYTES NFR BLD AUTO: 0.5 % — SIGNIFICANT CHANGE UP (ref 0–1.5)
LYMPHOCYTES # BLD AUTO: 2 K/UL — SIGNIFICANT CHANGE UP (ref 1–3.3)
LYMPHOCYTES # BLD AUTO: 23 % — SIGNIFICANT CHANGE UP (ref 13–44)
MCHC RBC-ENTMCNC: 31 PG — SIGNIFICANT CHANGE UP (ref 27–34)
MCHC RBC-ENTMCNC: 33.1 GM/DL — SIGNIFICANT CHANGE UP (ref 32–36)
MCV RBC AUTO: 93.6 FL — SIGNIFICANT CHANGE UP (ref 80–100)
MONOCYTES # BLD AUTO: 0.45 K/UL — SIGNIFICANT CHANGE UP (ref 0–0.9)
MONOCYTES NFR BLD AUTO: 5.2 % — SIGNIFICANT CHANGE UP (ref 2–14)
NEUTROPHILS # BLD AUTO: 6.16 K/UL — SIGNIFICANT CHANGE UP (ref 1.8–7.4)
NEUTROPHILS NFR BLD AUTO: 70.8 % — SIGNIFICANT CHANGE UP (ref 43–77)
NRBC # BLD: 0 /100 WBCS — SIGNIFICANT CHANGE UP (ref 0–0)
PLATELET # BLD AUTO: 274 K/UL — SIGNIFICANT CHANGE UP (ref 150–400)
POTASSIUM SERPL-MCNC: 3.4 MMOL/L — LOW (ref 3.5–5.3)
POTASSIUM SERPL-SCNC: 3.4 MMOL/L — LOW (ref 3.5–5.3)
PROT SERPL-MCNC: 7.1 G/DL — SIGNIFICANT CHANGE UP (ref 6–8.3)
RBC # BLD: 4.55 M/UL — SIGNIFICANT CHANGE UP (ref 3.8–5.2)
RBC # FLD: 11.8 % — SIGNIFICANT CHANGE UP (ref 10.3–14.5)
SALICYLATES SERPL-MCNC: <2 MG/DL — LOW (ref 15–30)
SODIUM SERPL-SCNC: 137 MMOL/L — SIGNIFICANT CHANGE UP (ref 135–145)
WBC # BLD: 8.69 K/UL — SIGNIFICANT CHANGE UP (ref 3.8–10.5)
WBC # FLD AUTO: 8.69 K/UL — SIGNIFICANT CHANGE UP (ref 3.8–10.5)

## 2021-02-10 PROCEDURE — 93010 ELECTROCARDIOGRAM REPORT: CPT

## 2021-02-10 PROCEDURE — 99285 EMERGENCY DEPT VISIT HI MDM: CPT

## 2021-02-10 RX ORDER — NICOTINE POLACRILEX 2 MG
1 GUM BUCCAL DAILY
Refills: 0 | Status: DISCONTINUED | OUTPATIENT
Start: 2021-02-10 | End: 2021-02-16

## 2021-02-10 RX ADMIN — Medication 1 PATCH: at 23:07

## 2021-02-10 RX ADMIN — Medication 2 MILLIGRAM(S): at 23:07

## 2021-02-10 NOTE — ED PROVIDER NOTE - OBJECTIVE STATEMENT
64 y/o F pmh anxiety, depression presents for concern for valium withdrawal 65y woman with anxiety/depression (on Valium for 25 years), s/p spinal stenosis 4 years ago (on chronic hydromorphone), sent to ED by PCP for possible valium withdrawal. collateral info obtained from pt's son Nicolas Bradshaw over the phone.   Pt has been taking Valium 10mg TID for 25 years, her psychiatry retired, and she switched to a new provider, who titrate the med to Valium 2mg TID about a month ago. Pt ran out of Valium prescription 2 weeks ago. Also takes  hydromorphone 2mg q6 PRN for the past 4 years, ran out of prescription 10 days ago.   Went to [facility] on 2/8 for tremors and anxiety, delusion, auditory and visual hallucination, per son, offered voluntary psy admission, which pt refused. Pt was not prescribed any BZ or pain meds at [facility].      Pt currently endorses tremor, shaking, feels bugs are crawling on her skin,  says she hears voices telling her what to do. she is A&Ox3 .      PSH: s/p spinal stenosis srugery, s/p cholecystomy     SH: active smoker, no recreational drug use 65y woman with anxiety/depression (on Valium for 25 years), s/p spinal stenosis surgery 4 years ago (on chronic hydromorphone), sent to ED by PCP for possible valium withdrawal. collateral info obtained from pt's son Nicolas Bradshaw over the phone.   Pt has been taking Valium 10mg TID for 25 years, her psychiatry retired, and she switched to a new provider, who titrate the med to Valium 2mg TID about a month ago. Pt ran out of Valium prescription 2 weeks ago. Also takes  hydromorphone 2mg q6 PRN for the past 4 years, ran out of prescription 10 days ago.   Went to [facility] on 2/8 for tremors and anxiety, delusion, auditory and visual hallucination, per son, offered voluntary psy admission, which pt refused. Pt was not prescribed any BZ or pain meds at [facility].      Pt currently endorses tremor, shaking, feels bugs are crawling on her skin,  says she hears voices telling her what to do. she is A&Ox3 .      PSH: s/p spinal stenosis srugery, s/p cholecystomy     SH: active smoker, no recreational drug use

## 2021-02-10 NOTE — ED PROVIDER NOTE - PHYSICAL EXAMINATION
GENERAL: anxious appearing    HEAD:  Atraumatic, Normocephalic  EYES: EOMI, conjunctiva and sclera clear, + pupil dilated b/l    ENT: Moist mucous membranes  NECK: Supple, No JVD  CHEST/LUNG: Clear to auscultation bilaterally; No rales, rhonchi, wheezing, or rubs. Unlabored respirations  HEART: Regular rate and rhythm; No murmurs, rubs, or gallops  ABDOMEN: BSx4; Soft, nontender, nondistended  EXTREMITIES:  2+ Peripheral Pulses, brisk capillary refill. No clubbing, cyanosis, or edema  NERVOUS SYSTEM:  A&Ox3, speech repetitive,  + tremors in UE b/l with extension     SKIN: No rashes or lesions

## 2021-02-10 NOTE — ED PROVIDER NOTE - CLINICAL SUMMARY MEDICAL DECISION MAKING FREE TEXT BOX
65y woman with anxiety/depression (on Valium for 25 years), s/p spinal stenosis surgery 4 years ago (on chronic hydromorphone), current everday smoker, sent to ED by PCP for possible valium withdrawal. Discussed with Toxicology, Ativan 2mg IV x1 given in ED, with improvement in symptoms, will admit to medicine for BZ withdrawal

## 2021-02-10 NOTE — ED ADULT NURSE NOTE - OBJECTIVE STATEMENT
patient is a 64 y/o F with hx of anxiety, GERD, COPD BIBEMS for "withdrawal of valium". per EMS patient 2 weeks ago stopped taking valium that was "8-10 mg". per EMS patient has since developed "erratic behavior" and stopped "making sense" to her loved ones. in [facility] patient is a 66 y/o F with hx of anxiety, GERD, COPD BIBEMS for "withdrawal of valium". per EMS patient 2 weeks ago stopped taking valium that was "8-10 mg". per EMS patient has since developed "erratic behavior" and stopped "making sense" to her loved ones. in [facility] ED patient repeatedly states that "I am a contagion and I have bugs all over me patient is a 64 y/o F with hx of anxiety, GERD, COPD BIBEMS for "withdrawal of valium". per EMS patient 2 weeks ago stopped taking valium that was "8-10 mg". per EMS patient has since developed "erratic behavior" and stopped "making sense" to her loved ones. in [facility] ED patient repeatedly states that "I am a contagion and I have bugs all over me theyre all in my hair". patient repeatedly states "I shouldn't be here Im so nervous". patient trembling stating "im cold". patient denies desire to hurt herself or others at this time. patient is a 66 y/o F with hx of anxiety, GERD, COPD BIBEMS for "withdrawal of valium". per EMS patient 2 weeks ago stopped taking valium that was "8-10 mg". per EMS patient has since developed "erratic behavior" and stopped "making sense" to her loved ones. in [facility] ED patient repeatedly states that "I am a contagion and I have bugs all over me theyre all in my hair". patient repeatedly states "I shouldn't be here Im so nervous". patient trembling stating "im cold". patient denies desire to hurt herself or others at this time.  patient is a&ox3, PERRL. strong peripheral pulses, strong extremities x4. respirations even and unlabored with symmetrical chest rise. abdomen soft, nondistended, nontender. skin intact  patient denies CP, SOB, h/a, dizziness, abd pain, n/v/d/c, urinary symptoms, cough, fever, chills, recent travel, or sick contacts patient is a 66 y/o F with hx of anxiety, GERD, COPD BIBEMS for "withdrawal of valium". per EMS patient 2 weeks ago stopped taking valium without tapering and her dose was  "8-10 mg" per day. per EMS patient has since developed "erratic behavior" and stopped "making sense" to her loved ones. in [facility] ED patient repeatedly states that "I am a contagion and I have bugs all over me theyre all in my hair". patient repeatedly states "I shouldn't be here Im so nervous". patient trembling stating "im cold". patient denies desire to hurt herself or others at this time. patient states over the last "7-10 days" that she has suddenly become incontinent of urine and feces and began to use diapers   patient is a&ox3, PERRL. strong peripheral pulses, strong extremities x4. respirations even and unlabored with symmetrical chest rise. abdomen soft, nondistended, nontender. skin intact  patient denies CP, SOB, h/a, dizziness, abd pain, n/v/d/c, urinary symptoms, cough, fever, chills, recent travel, or sick contacts

## 2021-02-10 NOTE — ED PROVIDER NOTE - PROGRESS NOTE DETAILS
Spoke to toxicology, recommend medicine admission, can treat withdrawal with Ativan 2mg IV, and re-assess in 15-20 min

## 2021-02-11 DIAGNOSIS — R15.9 FULL INCONTINENCE OF FECES: ICD-10-CM

## 2021-02-11 DIAGNOSIS — J44.9 CHRONIC OBSTRUCTIVE PULMONARY DISEASE, UNSPECIFIED: ICD-10-CM

## 2021-02-11 DIAGNOSIS — F13.239 SEDATIVE, HYPNOTIC OR ANXIOLYTIC DEPENDENCE WITH WITHDRAWAL, UNSPECIFIED: ICD-10-CM

## 2021-02-11 DIAGNOSIS — F41.9 ANXIETY DISORDER, UNSPECIFIED: ICD-10-CM

## 2021-02-11 DIAGNOSIS — R32 UNSPECIFIED URINARY INCONTINENCE: ICD-10-CM

## 2021-02-11 LAB
APPEARANCE UR: CLEAR — SIGNIFICANT CHANGE UP
BILIRUB UR-MCNC: NEGATIVE — SIGNIFICANT CHANGE UP
COLOR SPEC: YELLOW — SIGNIFICANT CHANGE UP
DIFF PNL FLD: NEGATIVE — SIGNIFICANT CHANGE UP
GLUCOSE UR QL: NEGATIVE — SIGNIFICANT CHANGE UP
KETONES UR-MCNC: ABNORMAL
LEUKOCYTE ESTERASE UR-ACNC: ABNORMAL
NITRITE UR-MCNC: NEGATIVE — SIGNIFICANT CHANGE UP
PCP SPEC-MCNC: SIGNIFICANT CHANGE UP
PH UR: 6 — SIGNIFICANT CHANGE UP (ref 5–8)
PROT UR-MCNC: ABNORMAL
SARS-COV-2 IGG SERPL QL IA: NEGATIVE — SIGNIFICANT CHANGE UP
SARS-COV-2 IGM SERPL IA-ACNC: 0.08 INDEX — SIGNIFICANT CHANGE UP
SARS-COV-2 RNA SPEC QL NAA+PROBE: SIGNIFICANT CHANGE UP
SP GR SPEC: 1.03 — HIGH (ref 1.01–1.02)
TSH SERPL-MCNC: 0.7 UIU/ML — SIGNIFICANT CHANGE UP (ref 0.27–4.2)
UROBILINOGEN FLD QL: ABNORMAL

## 2021-02-11 PROCEDURE — 72131 CT LUMBAR SPINE W/O DYE: CPT | Mod: 26

## 2021-02-11 PROCEDURE — 99223 1ST HOSP IP/OBS HIGH 75: CPT | Mod: 25

## 2021-02-11 PROCEDURE — 99222 1ST HOSP IP/OBS MODERATE 55: CPT

## 2021-02-11 PROCEDURE — 99233 SBSQ HOSP IP/OBS HIGH 50: CPT

## 2021-02-11 RX ORDER — CEFTRIAXONE 500 MG/1
1000 INJECTION, POWDER, FOR SOLUTION INTRAMUSCULAR; INTRAVENOUS EVERY 24 HOURS
Refills: 0 | Status: COMPLETED | OUTPATIENT
Start: 2021-02-11 | End: 2021-02-13

## 2021-02-11 RX ADMIN — Medication 50 MILLIGRAM(S): at 03:19

## 2021-02-11 RX ADMIN — Medication 1 PATCH: at 06:11

## 2021-02-11 RX ADMIN — Medication 1 PATCH: at 18:58

## 2021-02-11 RX ADMIN — CEFTRIAXONE 100 MILLIGRAM(S): 500 INJECTION, POWDER, FOR SOLUTION INTRAMUSCULAR; INTRAVENOUS at 18:09

## 2021-02-11 NOTE — BEHAVIORAL HEALTH ASSESSMENT NOTE - OTHER
Prior hx of substance use disorders, prior hx of inpatient psychiatric admission N/A T[facility] ISTOP Reference #: [id number] - prescribed last 01/05/2021 and filled on 01/05/2021 with diazepam 2 mg x 90 tablets for 30 days by Emi Abreu [facility] No prior hx of SA/SIB

## 2021-02-11 NOTE — H&P ADULT - HISTORY OF PRESENT ILLNESS
Pt is a 64yo F w/pmhx of anxiety, hx of benzodiazepine and alcohol abuse, hx lumbar spinal stenosis s/p laminectomy. recently tapered herself off of benzodiazpines-now off entirely x 1 week. Reports that she had tapered herself down to valium 2mg TID about one week ago and then stopped cold turkey after that. Since then has had increased anxiety, feelings of bugs crawling on her without any visual hallucinations, but reports that she has also been hearing voices speaking to her for several months. These voices are "in the walls" and generally tell her to do things-these things are generally her daily life tasks like getting up, going to the bathroom, etc. Voices have not encouraged violence so far. In addition to this, patient reports a 1 week history of both bowel and bladder incontinence with loose stools.

## 2021-02-11 NOTE — BEHAVIORAL HEALTH ASSESSMENT NOTE - ACTIVATING EVENTS/STRESSORS
Change in provider or treatment (i.e., medications, psychotherapy, milieu)/Acute medical problem/Substance intoxication or withdrawal/Hopeless about or dissatisfied with provider or treatment

## 2021-02-11 NOTE — BEHAVIORAL HEALTH ASSESSMENT NOTE - HPI (INCLUDE ILLNESS QUALITY, SEVERITY, DURATION, TIMING, CONTEXT, MODIFYING FACTORS, ASSOCIATED SIGNS AND SYMPTOMS)
Pt is a 64yo woman, domiciled with  and son, on disability, PPHx of anxiety, depression, insomnia, one prior psychiatric admission (in her 20's), connected to outpt psych service, on Valium for the past 25 years, PMH lumbar stenosis s/p laminectomy on Dilaudid, GERD, COPD, no prior SA/SIB, no hx of violence, no legal issues, BIB EMS to ED after being advised by her PMD out of concerns for benzodiazepine withdrawal. Consult was requested by primary medical team for pt's anxiety.    Pt reports she was recently tapered off of Valium with a new outpatient behavioral health provider. Pt is unsure of timeline for taper and symptoms due to recently poor memory and difficulty concentrating. Pt reports she is feeling very anxious, though she is anxious at baseline. She reports feeling uncertain and not knowing what to do. She denies sadness, hopelessness, anhedonia, S I/I/P. She denies auditory hallucinations at this time but endorses hearing voices at home coming from the walls telling her to do daily things such as to shower. They have not commanded her to hurt herself or others. She denies paranoid thoughts currently but endorses feeling, prior to hospitalization, that people might be spying on her, tapping her phone. She also endorses tactile sensation of crawling on her skin. She denies VH. She denies seizures in the past.    Pt was recently seen in [facility] ED 2/8/2021 and endorsed paranoid delusion, ideas of reference, AH at that time. She was not concerning for depression, mariana, SA/SIB, delirium.    Collateral from pt's son Yann Bradshaw [phone]  When pandemic started, pt was doing okay. In April/May 2020, pt started to become paranoid that someone was coming to get her/kidnap her. This continued throughout 2020 and got worse. Pt has aslo been stating that bugs are crawling on her skin for 3-4 months. Starting in January, pt was started on a Valium taper by her new PMD (Dr. Jones) and outpt  provider MACO Leums In the past 2-3 weeks ago, she started hearing voices and speaking with the voices. Last Saturday 2/6/21, she stayed in the shower for 4 hours because the voices were telling her to stay in the shower, and patient had to be taken out of the shower by her  and son. Pt changed her clothes 6 times in 30min recently because she heard voices disapproving her clothes. Valium ran out 2 weeks ago or so. Beginning of January, Abreu started to taper patient.  Pt has also had some slurred speech, shakiness, dizziness, sleeping poorly, not eating. No SIB/SA. Hydromorphone was stopped as well 2-3 weeks ago, and pain has been variable.    Per outpt PMD note 2/10/2021 by Dr. Enriqueta Jones  Called son to discuss, was on valium but cut down by other doctor (saw RACHELL Abreu, who works with psychiatrist Dr. Boyd, phone [phone]), so pt ran through all medication quicker and ran out a few weeks (?) ago, also stopped hydromorphone few weeks ago as well. Son noting past few weeks pt having auditory hallucinations, hears people in the walls, paranoid thoughts, barely sleeping. Called EMS 2 days ago, taken to [facility], psych evaluated her and offered voluntary admission but she declined, didn't want psych hospitalization. Spoke to pt, repeating words frequently during discussion, paranoid thoughts, sounds very shaky over the phone, son notes having tremors, very concerned for benzo withdrawal (psychosis, perceptual disturbances, tremors, anxiety), advised to call EMS and go back to ER.

## 2021-02-11 NOTE — BEHAVIORAL HEALTH ASSESSMENT NOTE - SUMMARY
Pt is a 66yo woman, domiciled with  and son, on disability, PPHx of anxiety, depression, insomnia, one prior psychiatric admission (in her 20's), connected to outpt psych service, on Valium for the past 25 years, PMH lumbar stenosis s/p laminectomy on Dilaudid, GERD, COPD, no prior SA/SIB, no hx of violence, no legal issues, BIB EMS to ED after being advised by her PMD out of concerns for benzodiazepine withdrawal. Consult was requested by primary medical team for pt's anxiety. Pt is a 64yo woman, domiciled with  and son, on disability, PPHx of anxiety, depression, insomnia, one prior psychiatric admission (in her 20's), connected to outpt psych service, on Valium for the past 25 years, PMH lumbar stenosis s/p laminectomy on Dilaudid, GERD, COPD, no prior SA/SIB, no hx of violence, no legal issues, BIB EMS to ED after being advised by her PMD out of concerns for benzodiazepine withdrawal. Consult was requested by primary medical team for pt's anxiety.  Pt's history is concerning for benzodiazepine withdrawal with recent taper from Valium and reported symptoms prior to hospitalization including tremulousness, poor sleep. Pt appears stable at this time with normal HR and BP, no significant diaphoresis or tremulousness.  Paranoid delusion and tactile hallucinations started prior to Valium taper. AH appears more recent. There may be an underlying primary psychiatric disorder, but would not start psychotropic medications for now until further observation.   Pt reports recent memory problems which may be related to an undiagnosed neurocognitive disorder or due to poor concentration from anxiety or benzodiazepine withdrawal.    - Continue CIWA protocol. If meeting 3 out of the following 5 criteria, utilize PRN Ativan 1-2mg  1) HR >110  2) SBP >140  3) Diaphoresis  4) Worsening tremors  5) Seizure    - Lab work for dementia w/u: B12, folate, TSH, vit D  - Would not start psychotropic medications at this time  - Routine observation

## 2021-02-11 NOTE — BEHAVIORAL HEALTH ASSESSMENT NOTE - NSBHCONSULTFOLLOWAFTERCARE_PSY_A_CORE FT
Pt may be referred to [facility] services [phone] or walk in [facility] [phone]

## 2021-02-11 NOTE — BEHAVIORAL HEALTH ASSESSMENT NOTE - COMMENTS ON VIOLENCE RISK/PROTECTIVE FACTORS:
Pt does not have elevated risk for violence. She does not require admission for safety at this time.

## 2021-02-11 NOTE — H&P ADULT - PROBLEM SELECTOR PLAN 2
No saddle anesthesia and still able to walk around without worsening of back pain.   Will obtain CT spine and MRI spine given hx of L1-L5 laminectomy.   Pt's surgeon was Dr. Syed Salinas who does not come to the hospital.  Orthospine consult in AM.  Given chronicity of incontinence and lack of saddle anesthesia doubt acute injury

## 2021-02-11 NOTE — BEHAVIORAL HEALTH ASSESSMENT NOTE - NSBHCHARTREVIEWLAB_PSY_A_CORE FT
14.1   8.69  )-----------( 274      ( 10 Feb 2021 20:06 )             42.6   02-10    137  |  99  |  30<H>  ----------------------------<  89  3.4<L>   |  23  |  1.05    Ca    9.4      10 Feb 2021 20:06    TPro  7.1  /  Alb  4.4  /  TBili  0.8  /  DBili  x   /  AST  31  /  ALT  23  /  AlkPhos  53  02-10    UTox 2/11/2021 - negative     COVID-19 PCR . (02.10.21 @ 23:34)   COVID-19 PCR: Shanae

## 2021-02-11 NOTE — BEHAVIORAL HEALTH ASSESSMENT NOTE - NSBHCHARTREVIEWIMAGING_PSY_A_CORE FT
EXAM:  CT BRAIN        PROCEDURE DATE:  Feb 9 2021         INTERPRETATION:  CLINICAL INFORMATION:  Mental status change, unknown cause    TECHNIQUE:  Axial CT images were acquired through the head.  Intravenous contrast: None  Two-dimensional reformats were generated.    COMPARISON STUDY: CT brain 6/26/2010    FINDINGS: Ventricles and cortical sulci are age-appropriate. There is mild periventricular white matter small vessel ischemic disease, presumed chronic. There is no intracranial bleed, extra-axial fluid collection, mass effect, midline shift, hydrocephalus, acute territorial infarct or depressed skull fracture evident. Imaged portions of the orbits, paranasal sinuses and mastoid air cells are grossly unremarkable. There is calcific atherosclerosis of bilateral cavernous ICAs.    IMPRESSION: No acute intracranial abnormality.

## 2021-02-11 NOTE — BEHAVIORAL HEALTH ASSESSMENT NOTE - NSBHCHARTREVIEWVS_PSY_A_CORE FT
Vital Signs Last 24 Hrs  T(C): 36.4 (11 Feb 2021 07:07), Max: 37 (10 Feb 2021 19:15)  T(F): 97.5 (11 Feb 2021 07:07), Max: 98.6 (10 Feb 2021 19:15)  HR: 67 (11 Feb 2021 07:07) (64 - 104)  BP: 114/78 (11 Feb 2021 07:07) (109/72 - 132/83)  BP(mean): --  RR: 18 (11 Feb 2021 07:07) (18 - 22)  SpO2: 100% (11 Feb 2021 07:07) (98% - 100%)

## 2021-02-11 NOTE — H&P ADULT - ASSESSMENT
64yo F w/pmhx of anxiety, hx of benzodiazepine and alcohol abuse, hx lumbar spinal stenosis s/p laminectomy. recently tapered herself off of benzodiazpines-now off entirely x 1 week now presenting with increased anxiety/tremulousness concerning for benzodiazepine withdrawal, also reporting bladder/bowel incontinence.

## 2021-02-11 NOTE — H&P ADULT - NSHPLABSRESULTS_GEN_ALL_CORE
Labs personally reviewed:                        14.1   8.69  )-----------( 274      ( 10 Feb 2021 20:06 )             42.6     02-10    137  |  99  |  30<H>  ----------------------------<  89  3.4<L>   |  23  |  1.05    Ca    9.4      10 Feb 2021 20:06    TPro  7.1  /  Alb  4.4  /  TBili  0.8  /  DBili  x   /  AST  31  /  ALT  23  /  AlkPhos  53  02-10        LIVER FUNCTIONS - ( 10 Feb 2021 20:06 )  Alb: 4.4 g/dL / Pro: 7.1 g/dL / ALK PHOS: 53 U/L / ALT: 23 U/L / AST: 31 U/L / GGT: x             Urinalysis Basic - ( 2021 00:40 )    Color: Yellow / Appearance: Clear / S.033 / pH: x  Gluc: x / Ketone: Moderate  / Bili: Negative / Urobili: 2 mg/dL   Blood: x / Protein: 30 mg/dL / Nitrite: Negative   Leuk Esterase: Large / RBC: 1 /hpf / WBC 27 /HPF   Sq Epi: x / Non Sq Epi: 6 /hpf / Bacteria: Negative    Imaging reviewed-no acute findings on CT head  Will order CT spine and MRI    EKG

## 2021-02-11 NOTE — BEHAVIORAL HEALTH ASSESSMENT NOTE - SUICIDE RISK FACTORS
Command hallucinations/Agitation/Severe Anxiety/Panic/Chronic pain/Unable to engage in safety planning/Insomnia/Psychotic disorder current/past

## 2021-02-11 NOTE — BEHAVIORAL HEALTH ASSESSMENT NOTE - RISK ASSESSMENT
Pt's history is concerning for benzodiazepine withdrawal with recent taper from Valium and reported symptoms prior to hospitalization including tremulousness, poor sleep. Pt appears stable at this time with normal HR and BP, no significant diaphoresis or tremulousness.  Paranoid delusion and tactile hallucinations started prior to Valium taper. AH appears more recent. There may be an underlying primary psychiatric disorder, but would not start psychotropic medications for now until further observation.   Pt reports recent memory problems which may be related to an undiagnosed neurocognitive disorder or due to poor concentration from anxiety or benzodiazepine withdrawal.    - Continue CIWA protocol. If meeting 3 out of the following 5 criteria, utilize PRN Ativan 1-2mg  1) HR >110  2) SBP >140  3) Diaphoresis  4) Worsening tremors  5) Seizure    - Lab work for dementia w/u: B12, folate, TSH, vit D  - Would not start psychotropic medications at this time  - Routine observation Moderate Acute Suicide Risk Low Acute Suicide Risk pt overall low risk for suicide attempt, no si/hi, no hx attempts, future oriented, risk factors include anxiety

## 2021-02-11 NOTE — BEHAVIORAL HEALTH ASSESSMENT NOTE - CASE SUMMARY
Pt is a 64yo woman, domiciled with  and son, on disability, PPHx of anxiety, depression, insomnia, one prior psychiatric admission (in her 20's), connected to outpt psych service, on Valium for the past 25 years, PMH lumbar stenosis s/p laminectomy on Dilaudid, GERD, COPD, no prior SA/SIB, no hx of violence, no legal issues, BIB EMS to ED after being advised by her PMD out of concerns for benzodiazepine withdrawal. Consult was requested by primary medical team for pt's anxiety.  Pt's history is concerning for benzodiazepine withdrawal with recent taper from Valium and reported symptoms prior to hospitalization including tremulousness, poor sleep. Pt appears stable at this time with normal HR and BP, no significant diaphoresis or tremulousness. pt reports vague hallucinations, unclear timeline. rec cont ciwa, monitor for signs of benzo withdrawal, can give ativan prn for symptoms above.

## 2021-02-11 NOTE — BEHAVIORAL HEALTH ASSESSMENT NOTE - DESCRIPTION (FIRST USE, LAST USE, QUANTITY, FREQUENCY, DURATION)
Recent attempt to taper, last used 1-2 weeks ago Smokes 10-15 cigarettes daily Remote history, has been sober for years

## 2021-02-11 NOTE — H&P ADULT - NSHPPHYSICALEXAM_GEN_ALL_CORE
Vital Signs Last 24 Hrs  T(C): 36.8 (11 Feb 2021 01:39), Max: 37 (10 Feb 2021 19:15)  T(F): 98.2 (11 Feb 2021 01:39), Max: 98.6 (10 Feb 2021 19:15)  HR: 71 (11 Feb 2021 01:39) (71 - 104)  BP: 126/85 (11 Feb 2021 01:39) (124/86 - 132/83)  BP(mean): --  RR: 18 (11 Feb 2021 01:39) (18 - 22)  SpO2: 98% (11 Feb 2021 01:39) (98% - 100%)    GENERAL: No acute distress, well-developed  HEAD:  Atraumatic, Normocephalic  EYES: EOMI, PERRLA, conjunctiva and sclera clear  ENT: Oral mucosa moist  NECK: Neck supple  CHEST/LUNG: Clear to auscultation bilaterally; No wheeze, no rales, no rhonchi.    HEART: Regular rate and rhythm; No murmurs, rubs, or gallops  ABDOMEN: Soft, Nontender, Nondistended; Bowel sounds present  EXTREMITIES:  No clubbing, cyanosis, or edema  VASCULAR: Posterior tibialis pulses intact bilaterally  PSYCH: Normal behavior, normal affect  NEUROLOGY: AAOx3, tremulous, no saddle anesthesia on exam. Patient was able to ambulate to ER with cane and reports ambulation is at baseline.  SKIN: grossly warm and dry

## 2021-02-11 NOTE — BEHAVIORAL HEALTH ASSESSMENT NOTE - DETAILS
Cognitive difficulties in mother after brain tumor surgery. OCD, anxiety in father, brother. None Zoloft was too activating. Buspar was sedating. Brain tumor in mother

## 2021-02-11 NOTE — ED ADULT NURSE REASSESSMENT NOTE - NS ED NURSE REASSESS COMMENT FT1
patient straight cathed by 2 RNs to maintain sterile technique. patient voided ~300 cc dark, clear urine. UA/drug screen collected and sent to lab for analysis

## 2021-02-11 NOTE — BEHAVIORAL HEALTH ASSESSMENT NOTE - OTHER PAST PSYCHIATRIC HISTORY (INCLUDE DETAILS REGARDING ONSET, COURSE OF ILLNESS, INPATIENT/OUTPATIENT TREATMENT)
Diagnoses: Anxiety, depression, insomnia  Inpatient admission: remote in her 20's at [facility]) due to worsening depression and being suicidal after fiance's death at the time, and needing to detox from alcohol + cocaine binge  Outpatient treatment: [facility] POLANCO [id number], currently with Emi DEWEY at [facility]  No prior SA/SIB

## 2021-02-11 NOTE — H&P ADULT - NSICDXPASTMEDICALHX_GEN_ALL_CORE_FT
PAST MEDICAL HISTORY:  Anxiety     Chronic midline back pain, unspecified back location     COPD (chronic obstructive pulmonary disease)     GERD (gastroesophageal reflux disease)

## 2021-02-12 DIAGNOSIS — F29 UNSPECIFIED PSYCHOSIS NOT DUE TO A SUBSTANCE OR KNOWN PHYSIOLOGICAL CONDITION: ICD-10-CM

## 2021-02-12 DIAGNOSIS — F05 DELIRIUM DUE TO KNOWN PHYSIOLOGICAL CONDITION: ICD-10-CM

## 2021-02-12 DIAGNOSIS — N39.0 URINARY TRACT INFECTION, SITE NOT SPECIFIED: ICD-10-CM

## 2021-02-12 LAB
24R-OH-CALCIDIOL SERPL-MCNC: 67.3 NG/ML — SIGNIFICANT CHANGE UP (ref 30–80)
ANION GAP SERPL CALC-SCNC: 11 MMOL/L — SIGNIFICANT CHANGE UP (ref 5–17)
BUN SERPL-MCNC: 34 MG/DL — HIGH (ref 7–23)
CALCIUM SERPL-MCNC: 9.3 MG/DL — SIGNIFICANT CHANGE UP (ref 8.4–10.5)
CHLORIDE SERPL-SCNC: 105 MMOL/L — SIGNIFICANT CHANGE UP (ref 96–108)
CO2 SERPL-SCNC: 22 MMOL/L — SIGNIFICANT CHANGE UP (ref 22–31)
CREAT SERPL-MCNC: 0.98 MG/DL — SIGNIFICANT CHANGE UP (ref 0.5–1.3)
CULTURE RESULTS: SIGNIFICANT CHANGE UP
FOLATE SERPL-MCNC: 6.3 NG/ML — SIGNIFICANT CHANGE UP
GLUCOSE SERPL-MCNC: 83 MG/DL — SIGNIFICANT CHANGE UP (ref 70–99)
HCT VFR BLD CALC: 40.6 % — SIGNIFICANT CHANGE UP (ref 34.5–45)
HCV AB S/CO SERPL IA: 0.35 S/CO — SIGNIFICANT CHANGE UP (ref 0–0.99)
HCV AB SERPL-IMP: SIGNIFICANT CHANGE UP
HGB BLD-MCNC: 13.7 G/DL — SIGNIFICANT CHANGE UP (ref 11.5–15.5)
MCHC RBC-ENTMCNC: 31.2 PG — SIGNIFICANT CHANGE UP (ref 27–34)
MCHC RBC-ENTMCNC: 33.7 GM/DL — SIGNIFICANT CHANGE UP (ref 32–36)
MCV RBC AUTO: 92.5 FL — SIGNIFICANT CHANGE UP (ref 80–100)
NRBC # BLD: 0 /100 WBCS — SIGNIFICANT CHANGE UP (ref 0–0)
PLATELET # BLD AUTO: 256 K/UL — SIGNIFICANT CHANGE UP (ref 150–400)
POTASSIUM SERPL-MCNC: 3.7 MMOL/L — SIGNIFICANT CHANGE UP (ref 3.5–5.3)
POTASSIUM SERPL-SCNC: 3.7 MMOL/L — SIGNIFICANT CHANGE UP (ref 3.5–5.3)
RBC # BLD: 4.39 M/UL — SIGNIFICANT CHANGE UP (ref 3.8–5.2)
RBC # FLD: 11.9 % — SIGNIFICANT CHANGE UP (ref 10.3–14.5)
SARS-COV-2 IGG SERPL IA-ACNC: 1.4 RATIO — HIGH
SARS-COV-2 IGG SERPL QL IA: NEGATIVE — SIGNIFICANT CHANGE UP
SARS-COV-2 IGG SERPL QL IA: POSITIVE
SARS-COV-2 IGM SERPL IA-ACNC: 0.37 RATIO — SIGNIFICANT CHANGE UP
SODIUM SERPL-SCNC: 138 MMOL/L — SIGNIFICANT CHANGE UP (ref 135–145)
SPECIMEN SOURCE: SIGNIFICANT CHANGE UP
VIT B12 SERPL-MCNC: >2000 PG/ML — HIGH (ref 232–1245)
WBC # BLD: 8.77 K/UL — SIGNIFICANT CHANGE UP (ref 3.8–10.5)
WBC # FLD AUTO: 8.77 K/UL — SIGNIFICANT CHANGE UP (ref 3.8–10.5)

## 2021-02-12 PROCEDURE — 99232 SBSQ HOSP IP/OBS MODERATE 35: CPT

## 2021-02-12 PROCEDURE — 99233 SBSQ HOSP IP/OBS HIGH 50: CPT | Mod: GC

## 2021-02-12 RX ORDER — DIAZEPAM 5 MG
2 TABLET ORAL EVERY 8 HOURS
Refills: 0 | Status: DISCONTINUED | OUTPATIENT
Start: 2021-02-12 | End: 2021-02-16

## 2021-02-12 RX ADMIN — Medication 1 PATCH: at 00:26

## 2021-02-12 RX ADMIN — Medication 2 MILLIGRAM(S): at 21:10

## 2021-02-12 RX ADMIN — Medication 1 PATCH: at 19:05

## 2021-02-12 RX ADMIN — Medication 1 PATCH: at 13:46

## 2021-02-12 RX ADMIN — CEFTRIAXONE 100 MILLIGRAM(S): 500 INJECTION, POWDER, FOR SOLUTION INTRAMUSCULAR; INTRAVENOUS at 17:24

## 2021-02-12 NOTE — PHYSICAL THERAPY INITIAL EVALUATION ADULT - PERTINENT HX OF CURRENT PROBLEM, REHAB EVAL
65 year old female with PMH of anxiety, benzodiazepine and alcohol abuse, lumbar spinal stenosis s/p laminectomy who recently tapered herself off of benzodiazpines-now off entirely x 1 week now presenting with increased anxiety/tremulousness concerning for benzodiazepine withdrawal, also reporting bladder/bowel incontinence.

## 2021-02-12 NOTE — PROGRESS NOTE BEHAVIORAL HEALTH - NSBHCONSULTWORKUPDETAILS_PSY_A_CORE FT
EEG  MR head - though pt refusing MR head, preferably at same time as MR spine  HIV, RPR HIV, RPR, neuro eval, ?MRI Head, ?EEG

## 2021-02-12 NOTE — PHYSICAL THERAPY INITIAL EVALUATION ADULT - SITTING BALANCE: DYNAMIC
"  Problem: Communication  Goal: The ability to communicate needs accurately and effectively will improve  Outcome: PROGRESSING AS EXPECTED  Note: Patient communicates needs effectively. Patient stated, \"I still feel the same\" after receiving bolus in ED and coming to floor. Encourage hydration.      Problem: Safety  Goal: Will remain free from injury  Outcome: PROGRESSING AS EXPECTED  Note: Bed alarm set on. Patient calls for assistance. Standby assist to bathroom due to pt report of dizziness.      " good balance

## 2021-02-12 NOTE — PROGRESS NOTE BEHAVIORAL HEALTH - SUMMARY
Pt is a 64yo woman, domiciled with  and son, on disability, PPHx of anxiety, depression, insomnia, one prior psychiatric admission (in her 20's), connected to outpt psych service, on Valium for the past 25 years, PMH lumbar stenosis s/p laminectomy on Dilaudid, GERD, COPD, no prior SA/SIB, no hx of violence, no legal issues, BIB EMS to ED after being advised by her PMD out of concerns for benzodiazepine withdrawal. Consult was requested by primary medical team for pt's anxiety.    Pt continuing to endorse paranoid delusion and AH, concerning for psychosis of unclear etiology. Current differentials include benzodiazepine withdrawal, dementia, delirium, primary psychotic disorder, mood disorder with psychotic features. Paranoid delusion has been present for at least 6 months, possibly with psychosocial stress, though AH is more recent with Valium taper and UTI. Pt may be predisposed to delirium if there is underlying dementia superimposed with medical illness (UTI, benzodiazepine withdrawal).    Pt lacked capacity upon today's evaluation to refuse MR spine.    - Continue UTI treatment  - Continue CIWA protocol  - Recommend further dementia w/u: HIV, RPR, MRI Head  - Enhanced supervision  - Do not recommend starting psychotropic medications at this time Pt is a 64yo woman, domiciled with  and son, on disability, PPHx of anxiety, depression, insomnia, one prior psychiatric admission (in her 20's), connected to outpt psych service, on Valium for the past 25 years, PMH lumbar stenosis s/p laminectomy on Dilaudid, GERD, COPD, no prior SA/SIB, no hx of violence, no legal issues, BIB EMS to ED after being advised by her PMD out of concerns for benzodiazepine withdrawal. Consult was requested by primary medical team for pt's anxiety.    Pt continuing to endorse paranoid delusion and AH, concerning for psychosis of unclear etiology. Current differentials include benzodiazepine withdrawal, dementia, delirium, primary psychotic disorder, mood disorder with psychotic features. Paranoid delusion has been present for at least 6 months, possibly with psychosocial stress, though AH is more recent with Valium taper and UTI. Pt may be predisposed to delirium if there is underlying dementia superimposed with medical illness (UTI, benzodiazepine withdrawal).    Pt lacked capacity upon today's evaluation to refuse MR spine.    - Continue UTI treatment  - Continue CIWA protocol  - Start Valium 2mg TID  - Recommend further dementia w/u: HIV, RPR, MRI Head  - Enhanced supervision  - Do not recommend starting psychotropic medications at this time Pt is a 64yo woman, domiciled with  and son, on disability, PPHx of anxiety, depression, insomnia, one prior psychiatric admission (in her 20's), connected to outpt psych service, on Valium for the past 25 years, PMH lumbar stenosis s/p laminectomy on Dilaudid, GERD, COPD, no prior SA/SIB, no hx of violence, no legal issues, BIB EMS to ED after being advised by her PMD out of concerns for benzodiazepine withdrawal. Consult was requested by primary medical team for pt's anxiety.    Pt continuing to endorse paranoid delusion and AH, concerning for psychosis of unclear etiology. Current differentials include benzodiazepine withdrawal, dementia, delirium, primary psychotic disorder, mood disorder with psychotic features. Paranoid delusion has been present for at least 6 months, possibly with psychosocial stress, though AH is more recent with Valium taper and UTI. Pt may be predisposed to delirium if there is underlying dementia superimposed with medical illness (UTI, benzodiazepine withdrawal).    Pt lacked capacity upon today's evaluation to refuse MR spine.    - Continue UTI treatment  - Continue CIWA protocol  - Start Valium 2mg TID  - Recommend further w/u: HIV, RPR, neuro eval, ?MRI Head, ?EEG  - Enhanced supervision
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
## 2021-02-12 NOTE — CONSULT NOTE ADULT - SUBJECTIVE AND OBJECTIVE BOX
p ([id number])     HPI:  Pt is a 64yo F w/pmhx of anxiety, hx of benzodiazepine and alcohol abuse, hx lumbar spinal stenosis s/p laminectomy. recently tapered herself off of benzodiazpines-now off entirely x 1 week. Reports that she had tapered herself down to valium 2mg TID about one week ago and then stopped cold turkey after that. Since then has had increased anxiety, feelings of bugs crawling on her without any visual hallucinations, but reports that she has also been hearing voices speaking to her for several months. These voices are "in the walls" and generally tell her to do things-these things are generally her daily life tasks like getting up, going to the bathroom, etc. Voices have not encouraged violence so far. In addition to this, patient reports a 1 week history of both bowel and bladder incontinence with loose stools.  (11 Feb 2021 02:51)      Imaging:  No EO of compression on CT L-spine.     Exam: AAOx3, UE 5/5, LE 5/5, SILT, patient refused rectal/inguinal exam    --Anticoagulation:    =====================  PAST MEDICAL HISTORY   Chronic midline back pain, unspecified back location    Anxiety    GERD (gastroesophageal reflux disease)    COPD (chronic obstructive pulmonary disease)      PAST SURGICAL HISTORY   H/O laminectomy    FH: cholecystectomy      Bananas (Vomiting; Diarrhea)  latex (Rash)  Levaquin (Other (Moderate))      MEDICATIONS:  Antibiotics:  cefTRIAXone   IVPB 1000 milliGRAM(s) IV Intermittent every 24 hours    Neuro:  LORazepam   Injectable 2 milliGRAM(s) IV Push every 2 hours PRN  LORazepam   Injectable 2 milliGRAM(s) IV Push every 1 hour PRN    Other:      SOCIAL HISTORY:   Occupation:   Marital Status:     FAMILY HISTORY:  No pertinent family history in first degree relatives        ROS: Negative except per HPI    LABS:                          13.7   8.77  )-----------( 256      ( 12 Feb 2021 06:40 )             40.6     02-12    138  |  105  |  34<H>  ----------------------------<  83  3.7   |  22  |  0.98    Ca    9.3      12 Feb 2021 06:38    TPro  7.1  /  Alb  4.4  /  TBili  0.8  /  DBili  x   /  AST  31  /  ALT  23  /  AlkPhos  53  02-10

## 2021-02-12 NOTE — PHYSICAL THERAPY INITIAL EVALUATION ADULT - ADDITIONAL COMMENTS
as per pt: PTA pt was living in a garden apartment 2nd floor + Stairs + hand rail and was independent in all functional mobility and ADL's. cane for gait. states she owns a RW , lives with 29 year old Son who stays with her and  who works.

## 2021-02-12 NOTE — CONSULT NOTE ADULT - ASSESSMENT
Kenia Bradshaw  65F with PMH of anxiety, benzodiazepine and alcohol abuse, lumbar spinal stenosis s/p L2-4 laminectomy at [facility], now p/w sxs of benzodiazepine withdrawal, reporting bladder/bowel incontinence and groin numbness x 5 days. Reports long history of back pain/radicular pain. Pt is an unreliable historian. No EO of compression on CT L-spine. Exam: AAOx3, UE 5/5, LE 5/5, SILT, patient refused rectal/inguinal exam. Very suspicious/anxious.  -Patient w/o capacity and unclear hx, recommend MRI L-spine w/o contrast. Patient non-cooperative and adamently refusing mri, will need to be under anesthesia  -Unable to contact family. Patient states she would never want surgery. Per psych no capacity to refuse surgery or MRI  -Unlikely 2/2 compression of the thecal sac. Bowel sxs began around time pt began to stop taking benzos and has new UTI to explain urinary sxs.  -Patient already here for 3 days, given duration of symptoms MRI non-emergent.

## 2021-02-12 NOTE — PROGRESS NOTE BEHAVIORAL HEALTH - DETAILS
Non-violent, undecipherable, demeaning command AH Urinary incontinence, currently being treated for UTI Urinary and bowel incontinence, shooting pain in bilateral legs, groin numbness

## 2021-02-13 LAB
HIV 1+2 AB+HIV1 P24 AG SERPL QL IA: SIGNIFICANT CHANGE UP
T PALLIDUM AB TITR SER: NEGATIVE — SIGNIFICANT CHANGE UP

## 2021-02-13 PROCEDURE — 99232 SBSQ HOSP IP/OBS MODERATE 35: CPT

## 2021-02-13 RX ADMIN — CEFTRIAXONE 100 MILLIGRAM(S): 500 INJECTION, POWDER, FOR SOLUTION INTRAMUSCULAR; INTRAVENOUS at 16:50

## 2021-02-13 RX ADMIN — Medication 1 PATCH: at 16:52

## 2021-02-13 RX ADMIN — Medication 1 PATCH: at 14:46

## 2021-02-13 RX ADMIN — Medication 2 MILLIGRAM(S): at 00:49

## 2021-02-13 RX ADMIN — Medication 2 MILLIGRAM(S): at 22:50

## 2021-02-13 RX ADMIN — Medication 1 PATCH: at 09:43

## 2021-02-13 RX ADMIN — Medication 1 PATCH: at 14:48

## 2021-02-13 RX ADMIN — Medication 2 MILLIGRAM(S): at 14:47

## 2021-02-13 RX ADMIN — Medication 2 MILLIGRAM(S): at 06:28

## 2021-02-13 NOTE — CONSULT NOTE ADULT - ATTENDING COMMENTS
Patient seen and examined with housestaff on rounds this am.   She denies any back pain, buttock or leg pain. She admits to incontinence of both bowel and bladder for approximately 10 days - she says she does not have the sensation of elimination.  Exam today shows ? mild 5-/5 weakness of left proximal LE - rest of strength is ok - she is diffusely hyporeflexic except ? left biceps.  Toes down.  No sensory level appreciated.  Agree with need for MRI LS spine with contrast to evaluate cauda equina.

## 2021-02-13 NOTE — PROGRESS NOTE BEHAVIORAL HEALTH - NSBHFUPINTERVALHXFT_PSY_A_CORE
Patient seen for f/u of benzo withdrawal, psychosis and anxiety. Seen and examined at bedside - at first is difficult to arouse. Overnight received 2 mg IV Ativan per CIWA after score of 4 for tactile disturbances, anxiety and tremor (prior few days, patient has scores of 0 and 1). Patient reports feeling drowsy after "they have me 2 tranquilizers last night." Reports that vague AH/VH "come and go," but that she has not had any since last night after receiving PRN Ativan and subsequently began "seeing double." Denies SI. Is A+O x3.     Further history - patient reports that she has a remote history of alcohol and cocaine use when she was in her 20's. States that currently, her , who she states is much younger than her (39 y/o), often spends most of her SSI money on his cocaine use, but that she has not been using cocaine or other substances. Reports lots of recent financial difficulty - cable service has been shut down because could not pay bills (in addition to 's spending, son from prior partner who lives at home recently lost his job and has been having mental health struggles).

## 2021-02-13 NOTE — PROGRESS NOTE BEHAVIORAL HEALTH - DETAILS
Urinary incontinence, currently being treated for UTI Urinary and bowel incontinence, shooting pain in bilateral legs, groin numbness

## 2021-02-13 NOTE — PROGRESS NOTE BEHAVIORAL HEALTH - NSBHCONSULTMEDS_PSY_A_CORE FT
- Recommend starting standing Remeron 7.5 mg QHS for insomnia/depression  - Continue standing Valium 2mg TID

## 2021-02-13 NOTE — PROGRESS NOTE BEHAVIORAL HEALTH - SUMMARY
Pt is a 64yo woman, domiciled with  and son, on disability, PPHx of anxiety, depression, insomnia, one prior psychiatric admission (in her 20's), connected to outpt psych service, on Valium for the past 25 years, PMH lumbar stenosis s/p laminectomy on Dilaudid, GERD, COPD, no prior SA/SIB, no hx of violence, no legal issues, BIB EMS to ED after being advised by her PMD out of concerns for benzodiazepine withdrawal.     One exam today, patient appears oversedated. Has had reduction in paranoid delusions and AH.     - Recommend stopping CIWA, and starting Ativan 1 mg PO/IV Q4H PRN for severe anxiety   - Recommend starting standing Remeron 7.5 mg QHS for insomnia/depression  - Continue standing Valium 2mg TID  - Recommend further w/u: HIV, RPR, neuro eval, ?MRI Head, ?EEG  - Continue enhanced supervision

## 2021-02-13 NOTE — CONSULT NOTE ADULT - SUBJECTIVE AND OBJECTIVE BOX
HPI:  64yo F w/ PMHx of anxiety, hx of benzodiazepine and alcohol abuse, hx lumbar spinal stenosis s/p laminectomy presents for benzo withdrawal. She recently tapered herself off of benzodiazpines-now off entirely x 1 week. Reports that she had tapered herself down to valium 2mg TID about one week ago and then stopped afterwards. Neurology was consulted for altered mental status. Since stopping medications pt has had increased anxiety, feelings of bugs crawling on her without any visual hallucinations, but reports that she has also been hearing voices speaking to her for several months. These voices are "in the walls" and generally tell her to do things-these things are generally her daily life tasks like getting up, going to the bathroom, etc. Voices have not encouraged violence so far. In addition to this, patient reports a 1 week history of both bowel and bladder incontinence with loose stools. Pt was evaluated by Neurosurgery, no acute intervention, low suspicion for cord compression.    Pt currently refusing all MRIs, but does not have capacity to do so.    MEDICATIONS  (STANDING):  cefTRIAXone   IVPB 1000 milliGRAM(s) IV Intermittent every 24 hours  diazepam    Tablet 2 milliGRAM(s) Oral every 8 hours  nicotine -  14 mG/24Hr(s) Patch 1 patch Transdermal daily    MEDICATIONS  (PRN):  LORazepam   Injectable 2 milliGRAM(s) IV Push every 2 hours PRN CIWA-Ar score increase by 2 points and a total score of 7 or less  LORazepam   Injectable 2 milliGRAM(s) IV Push every 1 hour PRN CIWA-Ar score 8 or greater    PAST MEDICAL & SURGICAL HISTORY:  Chronic midline back pain, unspecified back location    Anxiety  GERD (gastroesophageal reflux disease)  COPD (chronic obstructive pulmonary disease)  H/O laminectomy    FH: cholecystectomy    FAMILY HISTORY:  No pertinent family history in first degree relatives    Allergies  Bananas (Vomiting; Diarrhea)  latex (Rash)  Levaquin (Other (Moderate))    SHx - No smoking, No ETOH, No drug abuse    Vital Signs Last 24 Hrs  T(C): 36.3 (13 Feb 2021 08:59), Max: 36.7 (12 Feb 2021 10:32)  T(F): 97.4 (13 Feb 2021 08:59), Max: 98 (12 Feb 2021 10:32)  HR: 77 (13 Feb 2021 08:59) (77 - 83)  BP: 115/80 (13 Feb 2021 08:59) (104/66 - 123/71)  BP(mean): --  RR: 18 (13 Feb 2021 08:59) (17 - 18)  SpO2: 99% (13 Feb 2021 08:59) (98% - 99%)    PHYSICAL EXAM:  GENERAL: No acute distress  HEENT: Normocephalic; conjunctivae and sclerae clear; moist mucous membranes  NECK: Supple  EXTREMITIES: No cyanosis; no edema; no calf tenderness  SKIN: Warm and dry; no rash             Neurological Exam:      Other:    02-12    138  |  105  |  34<H>  ----------------------------<  83  3.7   |  22  |  0.98    Ca    9.3      12 Feb 2021 06:38                              13.7   8.77  )-----------( 256      ( 12 Feb 2021 06:40 )             40.6       Radiology  CT Head No Cont (02.09.21 @ 01:50)   IMPRESSION: No acute intracranial abnormality.                  HPI:  66yo F w/ PMHx of anxiety (on Valium for >20 years), hx of benzodiazepine and alcohol abuse, hx lumbar spinal stenosis s/p laminectomy presents for benzo withdrawal. She recently tapered herself off of benzodiazepines off entirely x 1 week. Reports that she had tapered herself down to valium 2mg TID about one week ago and then stopped taking the medication altogether afterwards. Neurology was consulted for altered mental status. Since stopping Valium, pt has had increased anxiety, feelings of bugs crawling on her without any visual hallucinations, but reports that she has also been hearing voices speaking to her for several months. These voices are "in the walls" and generally tell her to do things-these things are generally her daily life tasks like getting up, going to the bathroom, etc. Voices are not threatening. She denies SI/HI. Pt also reports 5 days of both bowel and bladder incontinence with loose stools prior to admission. She had a fall last week, described as something pushing her forward. Denies head trauma. Pt was evaluated by Neurosurgery, would like to assess for cord compression in setting of incontinence. Pt currently refusing all MRIs, but does not have capacity to do so. She was started on Ceftriaxone for empiric tx of UTI, pt cannot say whether she has had dysuria, but denies suprapubic pain.    MEDICATIONS  (STANDING):  cefTRIAXone   IVPB 1000 milliGRAM(s) IV Intermittent every 24 hours  diazepam    Tablet 2 milliGRAM(s) Oral every 8 hours  nicotine -  14 mG/24Hr(s) Patch 1 patch Transdermal daily    MEDICATIONS  (PRN):  LORazepam   Injectable 2 milliGRAM(s) IV Push every 2 hours PRN CIWA-Ar score increase by 2 points and a total score of 7 or less  LORazepam   Injectable 2 milliGRAM(s) IV Push every 1 hour PRN CIWA-Ar score 8 or greater    PAST MEDICAL & SURGICAL HISTORY:  Chronic midline back pain, unspecified back location    Anxiety  GERD (gastroesophageal reflux disease)  COPD (chronic obstructive pulmonary disease)  H/O laminectomy    FH: cholecystectomy    FAMILY HISTORY:  No pertinent family history in first degree relatives    Allergies  Bananas (Vomiting; Diarrhea)  latex (Rash)  Levaquin (Other (Moderate))    SHx - +ETOH abuse    Vital Signs Last 24 Hrs  T(C): 36.3 (13 Feb 2021 08:59), Max: 36.7 (12 Feb 2021 10:32)  T(F): 97.4 (13 Feb 2021 08:59), Max: 98 (12 Feb 2021 10:32)  HR: 77 (13 Feb 2021 08:59) (77 - 83)  BP: 115/80 (13 Feb 2021 08:59) (104/66 - 123/71)  BP(mean): --  RR: 18 (13 Feb 2021 08:59) (17 - 18)  SpO2: 99% (13 Feb 2021 08:59) (98% - 99%)    PHYSICAL EXAM:  GENERAL: No acute distress, flat affect, lying in bed  HEENT: Normocephalic; conjunctivae and sclerae clear; moist mucous membranes  NECK: Supple  EXTREMITIES: No cyanosis; no edema; no calf tenderness  SKIN: Warm and dry; no rash             Neurological Exam:  - Mental Status: Alert, psychomotor slowing, oriented to person, place, time, situation; no dysarthria, speech is fluent with intact naming, repetition, and comprehension, follows all commands  - Cranial Nerves II-XII:    II:  PERRL 4mm b/l; visual fields are full to confrontation  III, IV, VI:  EOMI, no nystagmus  V:  facial sensation is intact in the V1-V3 distribution bilaterally.  VII:  face is symmetric with normal eye closure and smile  VIII:  hearing is intact to finger rub  IX, X:  uvula is midline and soft palate rises symmetrically  XI:  head turning and shoulder shrug are intact bilaterally  XII:  tongue protrudes in the midline  - Motor:  strength is 5/5 throughout; normal muscle bulk and tone throughout; no pronator drift  - Reflexes:  2+ and symmetric at the biceps, triceps, brachioradialis, knees, and ankles;  plantar reflexes downgoing bilaterally  - Sensory:  intact to light touch and pinprick, symmetric  - Coordination:  finger-nose-finger without dysmetria  - Gait: pt refused    Other:    02-12    138  |  105  |  34<H>  ----------------------------<  83  3.7   |  22  |  0.98    Ca    9.3      12 Feb 2021 06:38                          13.7   8.77  )-----------( 256      ( 12 Feb 2021 06:40 )             40.6       Radiology  CT Head No Cont (02.09.21 @ 01:50)   IMPRESSION: No acute intracranial abnormality.                  HPI:  66yo F w/ PMHx of anxiety (on Valium for >20 years), hx of benzodiazepine and alcohol abuse, hx lumbar spinal stenosis s/p laminectomy presents for benzo withdrawal. She recently tapered herself off of benzodiazepines off entirely x 1 week. Reports that she had tapered herself down to valium 2mg TID about one week ago and then stopped taking the medication altogether afterwards. Neurology was consulted for altered mental status. Since stopping Valium, pt has had increased anxiety, feelings of bugs crawling and intermittent auditory and visual hallucinations that respond to Ativan. She states she has been hearing voices speaking to her for several months. These voices are "in the walls" and generally tell her to do her routine activities, e.g. getting out of bed, going to the bathroom, etc. Voices are not threatening. She denies SI/HI. Pt has a history of poor sleep for which she used to take melatonin. Pt also reports 5 days of both bowel and bladder incontinence with loose stools prior to admission. She had a fall last week, described as something pushing her forward. Denies head trauma. Pt was evaluated by Neurosurgery, would like to assess for cord compression in setting of incontinence. Pt currently refusing all MRIs, but does not have capacity to do so. She was started on Ceftriaxone for empiric tx of UTI, pt cannot say whether she has had dysuria, but denies suprapubic pain. Pt states she was given Valium and Ativan last night.    MEDICATIONS  (STANDING):  cefTRIAXone   IVPB 1000 milliGRAM(s) IV Intermittent every 24 hours  diazepam    Tablet 2 milliGRAM(s) Oral every 8 hours  nicotine -  14 mG/24Hr(s) Patch 1 patch Transdermal daily    MEDICATIONS  (PRN):  LORazepam   Injectable 2 milliGRAM(s) IV Push every 2 hours PRN CIWA-Ar score increase by 2 points and a total score of 7 or less  LORazepam   Injectable 2 milliGRAM(s) IV Push every 1 hour PRN CIWA-Ar score 8 or greater    PAST MEDICAL & SURGICAL HISTORY:  Chronic midline back pain, unspecified back location    Anxiety  GERD (gastroesophageal reflux disease)  COPD (chronic obstructive pulmonary disease)  H/O laminectomy    FH: cholecystectomy    FAMILY HISTORY:  No pertinent family history in first degree relatives    Allergies  Bananas (Vomiting; Diarrhea)  latex (Rash)  Levaquin (Other (Moderate))    SHx - +ETOH abuse    Vital Signs Last 24 Hrs  T(C): 36.3 (13 Feb 2021 08:59), Max: 36.7 (12 Feb 2021 10:32)  T(F): 97.4 (13 Feb 2021 08:59), Max: 98 (12 Feb 2021 10:32)  HR: 77 (13 Feb 2021 08:59) (77 - 83)  BP: 115/80 (13 Feb 2021 08:59) (104/66 - 123/71)  BP(mean): --  RR: 18 (13 Feb 2021 08:59) (17 - 18)  SpO2: 99% (13 Feb 2021 08:59) (98% - 99%)    PHYSICAL EXAM:  GENERAL: No acute distress, flat affect, lying in bed  HEENT: Normocephalic; conjunctivae and sclerae clear; moist mucous membranes  NECK: Supple  EXTREMITIES: No cyanosis; no edema; no calf tenderness  SKIN: Warm and dry; no rash             Neurological Exam:  - Mental Status: Alert, psychomotor slowing, oriented to person, place, time, situation; no dysarthria, speech is fluent with intact naming, repetition, and comprehension, follows all commands  - Cranial Nerves II-XII:    II:  PERRL 4mm b/l; visual fields are full to confrontation  III, IV, VI:  EOMI, no nystagmus  V:  facial sensation is intact in the V1-V3 distribution bilaterally.  VII:  face is symmetric with normal eye closure and smile  VIII:  hearing is intact to finger rub  IX, X:  uvula is midline and soft palate rises symmetrically  XI:  head turning and shoulder shrug are intact bilaterally  XII:  tongue protrudes in the midline  - Motor:  strength is 5/5 throughout; normal muscle bulk and tone throughout; no pronator drift  - Reflexes:  2+ and symmetric at the biceps, triceps, brachioradialis, knees, and ankles;  plantar reflexes downgoing bilaterally  - Sensory:  intact to light touch and pinprick, symmetric  - Coordination:  finger-nose-finger without dysmetria  - Gait: pt refused    Other:    02-12    138  |  105  |  34<H>  ----------------------------<  83  3.7   |  22  |  0.98    Ca    9.3      12 Feb 2021 06:38                          13.7   8.77  )-----------( 256      ( 12 Feb 2021 06:40 )             40.6       Radiology  CT Head No Cont (02.09.21 @ 01:50)   IMPRESSION: No acute intracranial abnormality.

## 2021-02-13 NOTE — PROGRESS NOTE BEHAVIORAL HEALTH - NSBHCHARTREVIEWINVESTIGATE_PSY_A_CORE FT
< from: 12 Lead ECG (02.11.21 @ 05:43) >      Ventricular Rate 67 BPM    Atrial Rate 67 BPM    P-R Interval 130 ms    QRS Duration 90 ms    Q-T Interval 410 ms    QTC Calculation(Bazett) 433 ms    P Axis 80 degrees    R Axis 90 degrees    T Axis 63 degrees    Diagnosis Line NORMAL SINUS RHYTHM  RIGHTWARD AXIS  NONSPECIFIC T WAVE ABNORMALITY  ABNORMAL ECG  WHEN COMPARED WITH ECG OF 10-FEB-2021 20:25, (UNCONFIRMED)  NO SIGNIFICANT CHANGE WAS FOUND    Confirmed by MD ZAID, BONNIE ([id number]) on 2/11/2021 9:49:57 PM    < end of copied text >

## 2021-02-13 NOTE — CONSULT NOTE ADULT - ASSESSMENT
64yo F w/ PMHx of anxiety, hx of benzodiazepine and alcohol abuse, hx lumbar spinal stenosis s/p laminectomy presents for benzo withdrawal, undergoing Neurology consult for AMS.    Kamini Garcias DO  PGY-2  Neurology Resident   66yo F w/ PMHx of anxiety, hx of benzodiazepine and alcohol abuse, hx lumbar spinal stenosis s/p laminectomy presents for benzo withdrawal, undergoing Neurology consult for AMS. She is on Ceftriaxone for empiric tx of UTI. Neuro exam is non-focal. No myelopathic signs or sensory level. Mental status is at baseline, she has a very flat affect, exhibits delusions and paranoia 2/2 possible psychosis.    Recommendations:  -Agree with MRI T/L spine w/o contrast under anesthesia, discussed again with patient  -Continue CIWA protocol  -Continue Valium 2mg q8h   -Monitor for signs of withdrawal  -Non-pharmacologic measures to reduce hospital acquired delirium  -Behavioral health eval appreciated    Pt to be seen and discussed with Dr. Oro, neurology attending.    Kamini Garcias,   PGY-2  Neurology Resident   64yo F w/ PMHx of anxiety, hx of benzodiazepine and alcohol abuse, hx lumbar spinal stenosis s/p laminectomy presents for benzo withdrawal, undergoing Neurology consult for AMS. She is on Ceftriaxone for empiric tx of UTI. Neuro exam is non-focal. No myelopathic signs or sensory level. Mental status is at baseline, she has a very flat affect, exhibits delusions and paranoia 2/2 possible psychosis.    Recommendations:  -Agree with MRI T/L spine w/o contrast under anesthesia, discussed again with patient  -MRI brain w/w/o contrast  -CIWA discontinued  -Continue Valium 2mg q8h   -Avoid oversedation  -Monitor for signs of withdrawal  -Non-pharmacologic measures to reduce hospital acquired delirium  -Behavioral health eval appreciated  -Pt will benefit from SW eval    Pt to be seen and discussed with Dr. Oro, neurology attending.    Kamini Garcias,   PGY-2  Neurology Resident   66yo F w/ PMHx of anxiety, hx of benzodiazepine and alcohol abuse, hx lumbar spinal stenosis s/p laminectomy presents for benzo withdrawal, undergoing Neurology consult for AMS. She is on Ceftriaxone for empiric tx of UTI. Neuro exam is non-focal. No myelopathic signs or sensory level. Mental status is at baseline, she has a very flat affect, exhibits delusions and paranoia 2/2 possible psychosis.    Recommendations:  -Agree with MRI T/L spine w/o contrast under anesthesia, discussed again with patient  -MRI brain w/w/o contrast  -CIWA discontinued  -Continue Valium 2mg q8h   -Consider melatonin 3mg for sleep  -Avoid oversedation  -Monitor for signs of withdrawal  -Non-pharmacologic measures to reduce hospital acquired delirium  -Behavioral health eval appreciated  -Pt will benefit from SW eval    Pt to be seen and discussed with Dr. Oro, neurology attending.    Kamini Garcias,   PGY-2  Neurology Resident

## 2021-02-13 NOTE — PROGRESS NOTE BEHAVIORAL HEALTH - NSBHCONSULTMEDOTHER_PSY_A_CORE FT
- Recommend stopping CIWA, and starting Ativan 1 mg PO/IV Q4H PRN for severe anxiety - Recommend stopping CIWA, and decreasing the Ativan to 1 mg PO/IV Q4H PRN for severe anxiety since pt appeared oversedated from the PRN Ativan 2mg

## 2021-02-14 PROCEDURE — 99232 SBSQ HOSP IP/OBS MODERATE 35: CPT

## 2021-02-14 PROCEDURE — 99222 1ST HOSP IP/OBS MODERATE 55: CPT

## 2021-02-14 RX ORDER — MIRTAZAPINE 45 MG/1
7.5 TABLET, ORALLY DISINTEGRATING ORAL AT BEDTIME
Refills: 0 | Status: DISCONTINUED | OUTPATIENT
Start: 2021-02-14 | End: 2021-02-16

## 2021-02-14 RX ADMIN — Medication 2 MILLIGRAM(S): at 05:44

## 2021-02-14 RX ADMIN — Medication 2 MILLIGRAM(S): at 22:14

## 2021-02-14 RX ADMIN — Medication 1 PATCH: at 11:49

## 2021-02-14 RX ADMIN — Medication 2 MILLIGRAM(S): at 13:35

## 2021-02-14 RX ADMIN — Medication 1 PATCH: at 13:31

## 2021-02-14 NOTE — PROGRESS NOTE BEHAVIORAL HEALTH - NSBHFUPINTERVALHXFT_PSY_A_CORE
Patient seen for f/u of benzo withdrawal, psychosis and anxiety. Did not received PRN Ativan overnight. Seen and examined at bedside, observed to be asleep initially but arouses easily to writer. Reports ongoing nervousness, anxiety. Unclear as to if feeling unsafe, paranoid. Reports AH diminishing. Endorses ongoing tactile sensation of crawling on skin, which bothers her intermittently; reports tactile hallucination improved with PRN Ativan 2 nights ago. Reports sleep improved, though appetite continues to be poor. Reports feeling cloud. Reports ongoing groin numbness, shooting pain in legs; pt continuing to refuse MRI though unable to demonstrate understanding, appreciation, reasoning for why MRI is recommended. Discussed with pt that MRI may proceed with sedation. Denies S I/I/P.

## 2021-02-14 NOTE — PROGRESS NOTE BEHAVIORAL HEALTH - NSBHCHARTREVIEWINVESTIGATE_PSY_A_CORE FT
< from: 12 Lead ECG (02.11.21 @ 05:43) >      Ventricular Rate 67 BPM    Atrial Rate 67 BPM    P-R Interval 130 ms    QRS Duration 90 ms    Q-T Interval 410 ms    QTC Calculation(Bazett) 433 ms    P Axis 80 degrees    R Axis 90 degrees    T Axis 63 degrees    Diagnosis Line NORMAL SINUS RHYTHM  RIGHTWARD AXIS  NONSPECIFIC T WAVE ABNORMALITY  ABNORMAL ECG  WHEN COMPARED WITH ECG OF 10-FEB-2021 20:25, (UNCONFIRMED)  NO SIGNIFICANT CHANGE WAS FOUND    Confirmed by MD ZAID, BONNIE ([id number]) on 2/11/2021 9:49:57 PM    < end of copied text > < from: 12 Lead ECG (02.11.21 @ 05:43) >    Ventricular Rate 67 BPM    Atrial Rate 67 BPM    P-R Interval 130 ms    QRS Duration 90 ms    Q-T Interval 410 ms    QTC Calculation(Bazett) 433 ms    P Axis 80 degrees    R Axis 90 degrees    T Axis 63 degrees    Diagnosis Line NORMAL SINUS RHYTHM  RIGHTWARD AXIS  NONSPECIFIC T WAVE ABNORMALITY  ABNORMAL ECG  WHEN COMPARED WITH ECG OF 10-FEB-2021 20:25, (UNCONFIRMED)  NO SIGNIFICANT CHANGE WAS FOUND    Confirmed by MD ZAID, BONNIE ([id number]) on 2/11/2021 9:49:57 PM    < end of copied text >

## 2021-02-14 NOTE — CHART NOTE - NSCHARTNOTEFT_GEN_A_CORE
Please inform neurosurgery when MRI is completed. Still recommend MRI of the L-spine up to T8 to r/o compression of the nerves/cauda equina

## 2021-02-14 NOTE — PROGRESS NOTE BEHAVIORAL HEALTH - NSBHCONSULTMEDS_PSY_A_CORE FT
- Recommend starting standing Remeron 7.5 mg QHS for insomnia/depression/poor appetite  - Continue standing Valium 2mg TID

## 2021-02-14 NOTE — PROGRESS NOTE BEHAVIORAL HEALTH - SUMMARY
Pt is a 64yo woman, domiciled with  and son, on disability, PPHx of anxiety, depression, insomnia, one prior psychiatric admission (in her 20's), connected to outpt psych service, on Valium for the past 25 years, PMH lumbar stenosis s/p laminectomy on Dilaudid, GERD, COPD, no prior SA/SIB, no hx of violence, no legal issues, BIB EMS to ED after being advised by her PMD out of concerns for benzodiazepine withdrawal.     One exam today, patient appears somewhat lethargic but arouses to voice and is conversant. Appears to have some diminishment in paranoid delusions and AH after starting standing Valium. Negative w/u so far for reversible causes of dementia. Pt continues to lack capacity for refusing MR spine.    - Recommend starting standing Remeron 7.5 mg QHS for insomnia, possible depression, poor appetite  - Continue standing Valium 2mg TID  - Continue Ativan 1 mg PO/IV Q4H PRN for severe anxiety   - Continue enhanced supervision  - Spoke with primary team, likely MRI head, spine tomorrow with PRNs or sedation Pt is a 66yo woman, domiciled with  and son, on disability, PPHx of anxiety, depression, insomnia, one prior psychiatric admission (in her 20's), connected to outpt psych service, on Valium for the past 25 years, PMH lumbar stenosis s/p laminectomy on Dilaudid, GERD, COPD, no prior SA/SIB, no hx of violence, no legal issues, BIB EMS to ED after being advised by her PMD out of concerns for benzodiazepine withdrawal. Pt has multiple stressors at home including her 38 yr old  spending much of her check on cocaine use.  She is depressed about his drug use but feels unable to help.  One exam today, patient appears somewhat lethargic but arouses to voice and is conversant. Appears to have some diminishment in paranoid delusions and AH after starting standing Valium. Negative w/u so far for reversible causes of dementia. Pt continues to lack capacity for refusing MR spine.    - Recommend starting standing Remeron 7.5 mg QHS for insomnia, possible depression, poor appetite  - Continue standing Valium 2mg TID  - Continue Ativan 1 mg PO/IV Q4H PRN for severe anxiety   - Continue enhanced supervision  - Spoke with primary team, likely MRI head, spine tomorrow with PRNs or sedation

## 2021-02-14 NOTE — PROGRESS NOTE BEHAVIORAL HEALTH - CASE SUMMARY
Pt is a 66yo woman, domiciled with  and son, on disability, PPHx of anxiety, depression, insomnia, one prior psychiatric admission (in her 20's), connected to outpt psych service, on Valium for the past 25 years, PMH lumbar stenosis s/p laminectomy on Dilaudid, GERD, COPD, no prior SA/SIB, no hx of violence, no legal issues, BIB EMS to ED after being advised by her PMD out of concerns for benzodiazepine withdrawal. Pt has multiple stressors at home including her 38 yr old  spending much of her check on cocaine use.  She is depressed about his drug use but feels unable to help.  One exam today, patient appears somewhat lethargic but arouses to voice and is conversant. Appears to have some diminishment in paranoid delusions and AH after starting standing Valium. Negative w/u so far for reversible causes of dementia. Pt continues to lack capacity for refusing MR spine.  I agree that she may benefit from starting Remeron for her depression and insomnia.
Pt is a 64yo woman, domiciled with  and son, on disability, PPHx of anxiety, depression, insomnia, one prior psychiatric admission (in her 20's), connected to outpt psych service, on Valium for the past 25 years, PMH lumbar stenosis s/p laminectomy on Dilaudid, GERD, COPD, no prior SA/SIB, no hx of violence, no legal issues, BIB EMS to ED after being advised by her PMD out of concerns for benzodiazepine withdrawal.   One exam today, patient appears oversedated. Has had reduction in paranoid delusions and AH.  I agree with decreasing the PRN Ativan to 1mg since pt appeared oversedated from the 2mg for severe anxiety only and discontinuing the CIWA.
66yo woman, domiciled with  and son, on disability, PPHx of anxiety, depression, insomnia, one prior psychiatric admission (in her 20's), connected to outpt psych service, on Valium for the past 25 years, PMH lumbar stenosis s/p laminectomy on Dilaudid, GERD, COPD, no prior SA/SIB, no hx of violence, no legal issues, BIB EMS to ED after being advised by her PMD out of concerns for benzodiazepine withdrawal. Consult was requested by primary medical team for pt's anxiety.  On interview pt is inattentive, grossly oriented but with difficulty retaining information, seen for capacity to refuse MR spine per neurosurgery request.  Met with pt and neurosurgery resident, discussed need for imaging, pt refusing but unable to demonstrate her understanding, reasoning, or appreciation of consequences of refusal.  Denies SIIP/HIIP.  Endorses CAH which tell her to do mundane things, such as take a shower, but it can last for 3 hours which pt finds distressing.  Denies CAH to harm self/others.  Also endorses beliefs her phone is being tapped, neighbors are monitoring, etc.  Reports issues with memory, intermittent feeling of confusion.  Dx: Benzo withdrawal, Delirium 2/2 GMC, r/o psychotic d/o.  Recs: 1. Pt does not have capacity to refuse MRI.  2. Restart Valium 2mg PO TID, CIWA monitoring, Ativan 1mg PO/IV q1h PRN objective sx benzo withdrawal.  3. neuro eval, consider MRI head, EEG. 4. [facility] psych will f/u.

## 2021-02-15 DIAGNOSIS — R41.0 DISORIENTATION, UNSPECIFIED: ICD-10-CM

## 2021-02-15 PROCEDURE — 99232 SBSQ HOSP IP/OBS MODERATE 35: CPT

## 2021-02-15 PROCEDURE — 99231 SBSQ HOSP IP/OBS SF/LOW 25: CPT

## 2021-02-15 RX ADMIN — Medication 2 MILLIGRAM(S): at 13:11

## 2021-02-15 RX ADMIN — Medication 1 PATCH: at 07:36

## 2021-02-15 RX ADMIN — Medication 1 PATCH: at 18:33

## 2021-02-15 RX ADMIN — Medication 2 MILLIGRAM(S): at 21:07

## 2021-02-15 RX ADMIN — Medication 1 PATCH: at 11:50

## 2021-02-15 RX ADMIN — Medication 2 MILLIGRAM(S): at 05:27

## 2021-02-15 NOTE — PROGRESS NOTE BEHAVIORAL HEALTH - NSBHCONSULTFOLLOWDETAILS_PSY_A_CORE FT
[facility] psych to f/u
Patient's mental status improved and she has capacity at this time
[facility] psych to f/u
[facility] psych to f/u

## 2021-02-15 NOTE — PROGRESS NOTE BEHAVIORAL HEALTH - SUMMARY
Pt is a 66yo woman, domiciled with  and son, on disability, PPHx of anxiety, depression, insomnia, one prior psychiatric admission (in her 20's), connected to outpt psych service, on Valium for the past 25 years, PMH lumbar stenosis s/p laminectomy on Dilaudid, GERD, COPD, no prior SA/SIB, no hx of violence, no legal issues, BIB EMS to ED after being advised by her PMD out of concerns for benzodiazepine withdrawal. Pt has multiple stressors at home including her 38 yr old  spending much of her check on cocaine use.    - started on standing Remeron 7.5 mg QHS for insomnia and Valium 2mg TID w/ Ativan 1 mg PO/IV Q4H PRN for severe anxiety   - regained capacity as of 2/15/21

## 2021-02-15 NOTE — PROGRESS NOTE BEHAVIORAL HEALTH - NSBHFUPINTERVALHXFT_PSY_A_CORE
Patient calm, cooperative, engages appropriately. MSE improved since yesterday. Patient denies and also does not manifest any benzodiazepine/substance withdrawal sxs. Patient did not need any Ativan / benzo PRNs in last 24 hrs. She states that she "went cold turkey" at home with her Valium and developed withdrawal symptoms. Patient reports she slept ok, her appetite is limited but denies nausea. She was able eat a small amount of Cheerios.

## 2021-02-15 NOTE — PROGRESS NOTE BEHAVIORAL HEALTH - NSBHCHARTREVIEWLAB_PSY_A_CORE FT
Thyroid Stimulating Hormone, Serum (02.11.21 @ 04:41)   Thyroid Stimulating Hormone, Serum: 0.70 uIU/mL
13.7   8.77  )-----------( 256      ( 12 Feb 2021 06:40 )             40.6     02-12    138  |  105  |  34<H>  ----------------------------<  83  3.7   |  22  |  0.98    Ca    9.3      12 Feb 2021 06:38
no new labs
Vitamin B12, Serum: >2000 pg/mL (02.12.21 @ 08:00)   Folate, Serum: 6.3 ng/mL (02.12.21 @ 08:00)   Thyroid Stimulating Hormone, Serum: 0.70 uIU/mL (02.11.21 @ 04:41)   Vitamin D, 25-Hydroxy: 67.3: VITD Interpretive Data Result:   30.0-80.0 ng/mL Optimal levels (Reference Range)   HIV-1/2 Antigen/Antibody Screen by [facility] (02.13.21 @ 08:54)   HIV-1/2 Combo Result: Nonreact  Treponema Pallidum Antibody Interpretation: Negative

## 2021-02-16 ENCOUNTER — TRANSCRIPTION ENCOUNTER (OUTPATIENT)
Age: 66
End: 2021-02-16

## 2021-02-16 VITALS
DIASTOLIC BLOOD PRESSURE: 89 MMHG | OXYGEN SATURATION: 98 % | SYSTOLIC BLOOD PRESSURE: 125 MMHG | TEMPERATURE: 98 F | RESPIRATION RATE: 18 BRPM | HEART RATE: 86 BPM

## 2021-02-16 DIAGNOSIS — Z29.9 ENCOUNTER FOR PROPHYLACTIC MEASURES, UNSPECIFIED: ICD-10-CM

## 2021-02-16 PROCEDURE — 80307 DRUG TEST PRSMV CHEM ANLYZR: CPT

## 2021-02-16 PROCEDURE — 81001 URINALYSIS AUTO W/SCOPE: CPT

## 2021-02-16 PROCEDURE — 84443 ASSAY THYROID STIM HORMONE: CPT

## 2021-02-16 PROCEDURE — 72131 CT LUMBAR SPINE W/O DYE: CPT

## 2021-02-16 PROCEDURE — 82607 VITAMIN B-12: CPT

## 2021-02-16 PROCEDURE — 99232 SBSQ HOSP IP/OBS MODERATE 35: CPT

## 2021-02-16 PROCEDURE — 80053 COMPREHEN METABOLIC PANEL: CPT

## 2021-02-16 PROCEDURE — 87635 SARS-COV-2 COVID-19 AMP PRB: CPT

## 2021-02-16 PROCEDURE — 51701 INSERT BLADDER CATHETER: CPT

## 2021-02-16 PROCEDURE — 86803 HEPATITIS C AB TEST: CPT

## 2021-02-16 PROCEDURE — 99239 HOSP IP/OBS DSCHRG MGMT >30: CPT

## 2021-02-16 PROCEDURE — 82306 VITAMIN D 25 HYDROXY: CPT

## 2021-02-16 PROCEDURE — 86769 SARS-COV-2 COVID-19 ANTIBODY: CPT

## 2021-02-16 PROCEDURE — 87389 HIV-1 AG W/HIV-1&-2 AB AG IA: CPT

## 2021-02-16 PROCEDURE — 86780 TREPONEMA PALLIDUM: CPT

## 2021-02-16 PROCEDURE — U0005: CPT

## 2021-02-16 PROCEDURE — 80048 BASIC METABOLIC PNL TOTAL CA: CPT

## 2021-02-16 PROCEDURE — 96374 THER/PROPH/DIAG INJ IV PUSH: CPT | Mod: XU

## 2021-02-16 PROCEDURE — 93005 ELECTROCARDIOGRAM TRACING: CPT | Mod: XU

## 2021-02-16 PROCEDURE — 87086 URINE CULTURE/COLONY COUNT: CPT

## 2021-02-16 PROCEDURE — 85025 COMPLETE CBC W/AUTO DIFF WBC: CPT

## 2021-02-16 PROCEDURE — 97162 PT EVAL MOD COMPLEX 30 MIN: CPT

## 2021-02-16 PROCEDURE — 85027 COMPLETE CBC AUTOMATED: CPT

## 2021-02-16 PROCEDURE — 82746 ASSAY OF FOLIC ACID SERUM: CPT

## 2021-02-16 PROCEDURE — 99285 EMERGENCY DEPT VISIT HI MDM: CPT | Mod: 25

## 2021-02-16 RX ORDER — NICOTINE POLACRILEX 2 MG
1 GUM BUCCAL
Qty: 30 | Refills: 0
Start: 2021-02-16 | End: 2021-03-17

## 2021-02-16 RX ORDER — DIAZEPAM 5 MG
1 TABLET ORAL
Qty: 21 | Refills: 0
Start: 2021-02-16 | End: 2021-02-22

## 2021-02-16 RX ORDER — DIAZEPAM 5 MG
1 TABLET ORAL
Qty: 90 | Refills: 0
Start: 2021-02-16 | End: 2021-03-17

## 2021-02-16 RX ORDER — DIAZEPAM 5 MG
1 TABLET ORAL
Qty: 0 | Refills: 0 | DISCHARGE
Start: 2021-02-16

## 2021-02-16 RX ADMIN — Medication 1 PATCH: at 11:08

## 2021-02-16 RX ADMIN — Medication 1 PATCH: at 11:05

## 2021-02-16 RX ADMIN — Medication 2 MILLIGRAM(S): at 14:04

## 2021-02-16 RX ADMIN — Medication 1 PATCH: at 18:20

## 2021-02-16 RX ADMIN — Medication 2 MILLIGRAM(S): at 05:06

## 2021-02-16 NOTE — PROGRESS NOTE ADULT - PROBLEM SELECTOR PROBLEM 2
Incontinence of feces, unspecified fecal incontinence type

## 2021-02-16 NOTE — DISCHARGE NOTE PROVIDER - NSFOLLOWUPCLINICS_GEN_ALL_ED_FT
[facility]  Psychiatry  [address] [place] [address]  Phone: [phone]  Fax:   Follow Up Time:

## 2021-02-16 NOTE — PROGRESS NOTE ADULT - PROBLEM SELECTOR PROBLEM 4
Chronic obstructive pulmonary disease, unspecified COPD type

## 2021-02-16 NOTE — PROGRESS NOTE BEHAVIORAL HEALTH - NSBHCONSULTFOLLOWAFTERCARE_PSY_A_CORE FT
pt can f/u with her psychiatrist
to be determined
Pt may be referred to [facility] services [phone] or walk in [facility] [phone]

## 2021-02-16 NOTE — PROGRESS NOTE ADULT - PROBLEM SELECTOR PROBLEM 1
Benzodiazepine withdrawal with complication

## 2021-02-16 NOTE — DISCHARGE NOTE PROVIDER - NSDCPNSUBOBJ_GEN_ALL_CORE
Pt admitted for benzo withdrawal.  Saw psychiatry here who recommended she stay on her current dose of Valium and taper with her psychiatrist Dr. Cai as an outpt.  She was also endorsing some new urinary and fecal incontinence for which we had neurosurgery see her who recommended MRI.  She refused MRI thoracic, lumbar and brain.  I discussed in detail with her the possibility of paralysis and risk given her surgery.  She refused several times.  Also offered to do just MRI lumbar spine with sedation and she again refused.  We had psychiatry see her who deemed she did have capacity to refuse the MRI.  She will DC home with her Valium and need a slower taper as outpatient.  Emailed sign out regarding Valium and the refusal of MRIs to her PMD Dr. Enriqueta Jones and message left with Dr. Jenise Cai's office to inform them.  Temporary valium prescription sent to pharmacy.   Discharge time 68 minutes.

## 2021-02-16 NOTE — PROGRESS NOTE BEHAVIORAL HEALTH - NSBHATTESTSEENBY_PSY_A_CORE
attending Psychiatrist without NP/Trainee
Attending Psychiatrist supervising NP/Trainee, meeting pt...
Attending Psychiatrist supervising NP/Trainee, meeting pt...
attending Psychiatrist without NP/Trainee
Attending Psychiatrist supervising NP/Trainee, meeting pt...

## 2021-02-16 NOTE — PROGRESS NOTE ADULT - PROBLEM SELECTOR PLAN 2
-Patient endorsing bowel incontinence, no saddle anesthesia and still able to walk around without worsening of back pain per patient  -CT spine with degenerative changes   -Will obtain MRI spine given history of L1-L5 laminectomy  -Surgeon was Dr. Syed Salinas who does not come to the hospital.  -Spine surgery consulted, follow up recommendations  -Given chronicity of incontinence and lack of saddle anesthesia doubt acute injury
-Patient endorsing bowel incontinence, no saddle anesthesia and still able to walk around without worsening of back pain per patient  -CT spine with degenerative changes   -Will obtain MRI spine given history of L1-L5 laminectomy  -Surgeon was Dr. Syed Salinas who does not come to the hospital.  -Spine surgery consulted, follow up recommendations  -Given chronicity of incontinence and lack of saddle anesthesia doubt acute injury
-Patient endorsing bowel incontinence, no saddle anesthesia and still able to walk around without worsening of back pain per patient  -CT spine with degenerative changes   -Will obtain MRI spine given history of L1-L5 laminectomy  -Surgeon was Dr. Syed Salinas who does not come to the hospital.  -Spine surgery consulted rec MRI prefer up to T8  -Pt agreed to try MR Lumbar spine with IV ativan prior.  Discussed with MRI and they will call for her tonight.
-Patient endorsing bowel incontinence, no saddle anesthesia and still able to walk around without worsening of back pain per patient  -CT spine with degenerative changes   -Will obtain MRI spine given history of L1-L5 laminectomy  -Surgeon was Dr. Syed Salinas who does not come to the hospital.  -Spine surgery consulted, follow up recommendations  -Given chronicity of incontinence and lack of saddle anesthesia doubt acute injury

## 2021-02-16 NOTE — PROGRESS NOTE ADULT - PROBLEM SELECTOR PROBLEM 3
Urinary incontinence, unspecified type

## 2021-02-16 NOTE — DISCHARGE NOTE PROVIDER - CARE PROVIDERS DIRECT ADDRESSES
[email or web address]

## 2021-02-16 NOTE — PROGRESS NOTE ADULT - PROBLEM SELECTOR PLAN 6
No need for dvt ppx pt ambulatory  regular diet  if refuses MRI then DC home per psych pt has capacity to refuse

## 2021-02-16 NOTE — DISCHARGE NOTE NURSING/CASE MANAGEMENT/SOCIAL WORK - PATIENT PORTAL LINK FT
You can access the FollowMyHealth Patient Portal offered by [facility] by registering at the following website: [email or web address]. By joining [employer]’s FollowMyHealth portal, you will also be able to view your health information using other applications (apps) compatible with our system.

## 2021-02-16 NOTE — PROGRESS NOTE ADULT - PROBLEM SELECTOR PLAN 5
-Patient with positive UA and endorses incontinence, unclear if related to UTI or spinal issue  -s/p ctx
-Patient with positive UA and endorses incontinence, unclear if related to UTI or spinal issue-states incontinence has been prolonged but not sure if patient reliable, attempted collateral from son but unable to reach x 3 yesterday  -Continue ceftriaxone and follow up urine culture
-Patient with positive UA and endorses incontinence, unclear if related to UTI or spinal issue-states incontinence has been prolonged but not sure if patient reliable, attempted collateral from son but unable to reach x 3 yesterday  -Continue ceftriaxone and follow up urine culture
-Patient with positive UA and endorses incontinence, unclear if related to UTI or spinal issue  -completed ceftriaxone
-Patient with positive UA and endorses incontinence, unclear if related to UTI or spinal issue-states incontinence has been prolonged but not sure if patient reliable, attempted collateral from son but unable to reach x 3 yesterday  -Continue ceftriaxone and follow up urine culture

## 2021-02-16 NOTE — PROGRESS NOTE BEHAVIORAL HEALTH - SUMMARY
Pt is a 64yo woman, domiciled with  and son, on disability, PPHx of anxiety, depression, insomnia, one prior psychiatric admission (in her 20's), connected to outpt psych service, on Valium for the past 25 years, PMH lumbar stenosis s/p laminectomy on Dilaudid, GERD, COPD, no prior SA/SIB, no hx of violence, no legal issues, BIB EMS to ED after being advised by her PMD out of concerns for benzodiazepine withdrawal. Pt has multiple stressors at home including her 38 yr old  spending much of her check on cocaine use.

## 2021-02-16 NOTE — DISCHARGE NOTE PROVIDER - NSDCCPCAREPLAN_GEN_ALL_CORE_FT
PRINCIPAL DISCHARGE DIAGNOSIS  Diagnosis: Benzodiazepine withdrawal  Assessment and Plan of Treatment: Presenting with increased anxiety/tremulousness concerning for benzodiazepine withdrawal vs new onset psychosis, also reporting bladder/bowel incontinence.   Seen by Psych- s/p Ciwa and medications adjusted.   Pt seen by Neurology: Patient endorsing bowel incontinence, no saddle anesthesia and still able to walk around without worsening of back pain per patient.  -CT spine with degenerative changes. MRI spine ordered given history of L1-L5 laminectomy. Pt refused testing, seen by Psych: deemed to not have capacity to refuse MRI but on further assessment Psych deemed patient capable of refusing. Pt continued to refuse MRI. Pt cleared for DC by Psych with outpatient follow up.   Follow up with Psych and PCP as OP.

## 2021-02-16 NOTE — PROGRESS NOTE BEHAVIORAL HEALTH - NSBHCHARTREVIEWINVESTIGATE_PSY_A_CORE FT
Ventricular Rate 67 BPM    Atrial Rate 67 BPM    P-R Interval 130 ms    QRS Duration 90 ms    Q-T Interval 410 ms    QTC Calculation(Bazett) 433 ms    P Axis 80 degrees    R Axis 90 degrees    T Axis 63 degrees    Diagnosis Line NORMAL SINUS RHYTHM  RIGHTWARD AXIS  NONSPECIFIC T WAVE ABNORMALITY  ABNORMAL ECG  WHEN COMPARED WITH ECG OF 10-FEB-2021 20:25, (UNCONFIRMED)  NO SIGNIFICANT CHANGE WAS FOUND    Confirmed by MD ZAID, BONNIE ([id number]) on 2/11/2021 9:49:57 PM

## 2021-02-16 NOTE — DISCHARGE NOTE PROVIDER - HOSPITAL COURSE
65 year old female with PMH of anxiety, benzodiazepine and alcohol abuse, lumbar spinal stenosis s/p laminectomy who recently tapered herself off of benzodiazapine -now off entirely x 1 week now presenting with increased anxiety/tremulousness concerning for benzodiazepine withdrawal vs new onset psychosis, also reporting bladder/bowel incontinence.     Pt seen by Neurology: Patient endorsing bowel incontinence, no saddle anesthesia and still able to walk around without worsening of back pain per patient.  -CT spine with degenerative changes. MRI spine ordered given history of L1-L5 laminectomy. Pt refused testing, seen by Psych: deemed to not have capacity to refuse MRI but on further assessment Psych deemed patient capable of refusing.                -Surgeon was Dr. Syed Salinas who does not come to the hospital.  -Spine surgery consulted, follow up recommendations  -Given chronicity of incontinence and lack of saddle anesthesia doubt acute injury  Psych:      Problem/Plan - 1:  ·  Problem: Benzodiazepine withdrawal with complication.  Plan: -CIWA protocol  -Psych consulted, stable off ciwa  -Maintain prn Ativan for agitation  -As pt with persistent psychosis need MRIb to r/o cause--per psych now has capacity to decide  will give ativan beforehand if needed.      Problem/Plan - 2:  ·  Problem: Incontinence of feces, unspecified fecal incontinence type.  Plan: -Patient endorsing bowel incontinence, no saddle anesthesia and still able to walk around without worsening of back pain per patient  -CT spine with degenerative changes   -Will obtain MRI spine given history of L1-L5 laminectomy  -Surgeon was Dr. Syed Salinas who does not come to the hospital.  -Spine surgery consulted, follow up recommendations  -Given chronicity of incontinence and lack of saddle anesthesia doubt acute injury.      Problem/Plan - 3:  ·  Problem: Urinary incontinence, unspecified type.  Plan: -Patient endorsing bladder incontinence as well, no saddle anesthesia and still able to walk around without worsening of back pain per patient  -CT spine with degenerative changes   -Will obtain MRI spine given history of L1-L5 laminectomy  -Surgeon was Dr. Syed Salinas who does not come to the hospital.  -Spine surgery consulted, follow up recommendations  -Given chronicity of incontinence and lack of saddle anesthesia doubt acute injury.      Problem/Plan - 4:  ·  Problem: Chronic obstructive pulmonary disease, unspecified COPD type.  Plan: -Nicotine patch, encouraged smoking cessation.      Problem/Plan - 5:  ·  Problem: UTI (urinary tract infection).  Plan: -Patient with positive UA and endorses incontinence, unclear if related to UTI or spinal issue 65 year old female with PMH of anxiety, benzodiazepine and alcohol abuse, lumbar spinal stenosis s/p laminectomy who recently tapered herself off of benzodiazapine -now off entirely x 1 week now presenting with increased anxiety/tremulousness concerning for benzodiazepine withdrawal vs new onset psychosis, also reporting bladder/bowel incontinence.   Seen by Psych- s/p Ciwa and medications adjusted.   Pt seen by Neurology: Patient endorsing bowel incontinence, no saddle anesthesia and still able to walk around without worsening of back pain per patient.  -CT spine with degenerative changes. MRI spine ordered given history of L1-L5 laminectomy. Pt refused testing, seen by Psych: deemed to not have capacity to refuse MRI but on further assessment Psych deemed patient capable of refusing. Pt continued to refuse MRI. Pt cleared for DC by Psych with outpatient follow up.     Pt cleared for DC by Dr. Key (medicine attending) to follow up with Psych and PCP as OP.      65 year old female with PMH of anxiety, benzodiazepine and alcohol abuse, lumbar spinal stenosis s/p laminectomy who recently tapered herself off of benzodiazapine -now off entirely x 1 week now presenting with increased anxiety/tremulousness concerning for benzodiazepine withdrawal vs new onset psychosis, also reporting bladder/bowel incontinence.   Seen by Psych- s/p Ciwa and medications adjusted and prescribed  7 day course  Pt seen by Neurology: Patient endorsing bowel incontinence, no saddle anesthesia and still able to walk around without worsening of back pain per patient.  -CT spine with degenerative changes. MRI spine ordered given history of L1-L5 laminectomy. Pt refused testing, seen by Psych: deemed to not have capacity to refuse MRI but on further assessment Psych deemed patient capable of refusing. Pt continued to refuse MRI. Pt cleared for DC by Psych with outpatient follow up. Plan also emailed to PMD.     Pt cleared for DC by Dr. Key (medicine attending) to follow up with Psych and PCP as OP.

## 2021-02-16 NOTE — PROGRESS NOTE BEHAVIORAL HEALTH - NSBHCHARTREVIEWVS_PSY_A_CORE FT
ICU Vital Signs Last 24 Hrs  T(C): 36.6 (16 Feb 2021 12:24), Max: 37.2 (15 Feb 2021 20:57)  T(F): 97.8 (16 Feb 2021 12:24), Max: 99 (15 Feb 2021 20:57)  HR: 86 (16 Feb 2021 12:24) (78 - 86)  BP: 125/89 (16 Feb 2021 12:24) (117/78 - 125/89)  BP(mean): --  ABP: --  ABP(mean): --  RR: 18 (16 Feb 2021 12:24) (18 - 18)  SpO2: 98% (16 Feb 2021 12:24) (97% - 99%)
Vital Signs Last 24 Hrs  T(C): 36.7 (12 Feb 2021 10:32), Max: 37.3 (12 Feb 2021 00:33)  T(F): 98 (12 Feb 2021 10:32), Max: 99.1 (12 Feb 2021 00:33)  HR: 83 (12 Feb 2021 10:32) (68 - 87)  BP: 123/71 (12 Feb 2021 10:32) (109/70 - 126/87)  BP(mean): --  RR: 18 (12 Feb 2021 10:32) (18 - 18)  SpO2: 98% (12 Feb 2021 10:32) (97% - 100%)
T(C): 36.7 (02-15-21 @ 04:31), Max: 37 (02-14-21 @ 12:57)  HR: 68 (02-15-21 @ 04:31) (68 - 93)  BP: 112/76 (02-15-21 @ 04:31) (109/77 - 132/88)  RR: 18 (02-15-21 @ 04:31) (18 - 18)  SpO2: 98% (02-15-21 @ 04:31) (95% - 99%)
ICU Vital Signs Last 24 Hrs  T(C): 37 (14 Feb 2021 12:57), Max: 37.2 (13 Feb 2021 20:45)  T(F): 98.6 (14 Feb 2021 12:57), Max: 98.9 (13 Feb 2021 20:45)  HR: 72 (14 Feb 2021 12:57) (72 - 83)  BP: 109/77 (14 Feb 2021 12:57) (109/77 - 129/86)  BP(mean): --  ABP: --  ABP(mean): --  RR: 18 (14 Feb 2021 12:57) (18 - 18)  SpO2: 98% (14 Feb 2021 12:57) (98% - 99%)
Vital Signs Last 24 Hrs  T(C): 36.3 (13 Feb 2021 08:59), Max: 36.7 (12 Feb 2021 10:32)  T(F): 97.4 (13 Feb 2021 08:59), Max: 98 (12 Feb 2021 10:32)  HR: 77 (13 Feb 2021 08:59) (77 - 83)  BP: 115/80 (13 Feb 2021 08:59) (104/66 - 123/71)  BP(mean): --  RR: 18 (13 Feb 2021 08:59) (17 - 18)  SpO2: 99% (13 Feb 2021 08:59) (98% - 99%)

## 2021-02-16 NOTE — DISCHARGE NOTE PROVIDER - PROVIDER TOKENS
PROVIDER:[TOKEN:[[id number]]] PROVIDER:[TOKEN:[[id number]]],FREE:[LAST:[Outpatient Psychiatrist],PHONE:[(   )    -],FAX:[(   )    -]]

## 2021-02-16 NOTE — PROGRESS NOTE BEHAVIORAL HEALTH - NSBHCONSULTMEDANXIETY_PSY_A_CORE FT
C/w Ativan 1mg q6h PRN for anxiety

## 2021-02-16 NOTE — PROGRESS NOTE ADULT - PROBLEM SELECTOR PLAN 4
-Nicotine patch, encouraged smoking cessation

## 2021-02-16 NOTE — DISCHARGE NOTE PROVIDER - CARE PROVIDER_API CALL
Enriqueta Jones)  Internal Medicine  [address], [address]  [place] [address]  Phone: [phone]  Fax: [phone]  Follow Up Time:    Enriqueta Jones)  Internal Medicine  [address], [address]  [place] [address]  Phone: [phone]  Fax: [phone]  Follow Up Time:     Outpatient Psychiatrist,   Phone: (   )    -  Fax: (   )    -  Follow Up Time:

## 2021-02-16 NOTE — PROGRESS NOTE ADULT - ASSESSMENT
65 year old female with PMH of anxiety, benzodiazepine and alcohol abuse, lumbar spinal stenosis s/p laminectomy who recently tapered herself off of benzodiazpines-now off entirely x 1 week now presenting with increased anxiety/tremulousness concerning for benzodiazepine withdrawal vs new onset psychosis, also reporting bladder/bowel incontinence. 
65 year old female with PMH of anxiety, benzodiazepine and alcohol abuse, lumbar spinal stenosis s/p laminectomy who recently tapered herself off of benzodiazpines-now off entirely x 1 week now presenting with increased anxiety/tremulousness concerning for benzodiazepine withdrawal vs new onset psychosis, also reporting bladder/bowel incontinence. Per psych, now has capacity. Will obtain mri if possible and offer premed prn for anxiety. If pt declines, can likely start dc planning.
65F with PMH of anxiety, benzodiazepine and alcohol abuse, lumbar spinal stenosis s/p L2-4 laminectomy at [facility], now p/w sxs of benzodiazepine withdrawal, reporting bladder/bowel incontinence and groin numbness x 5 days. Reports long history of back pain/radicular pain. Pt is an unreliable historian. No EO of compression on CT L-spine. Exam: AAOx3, UE 5/5, LE 5/5, SILT, patient refused rectal/inguinal exam. Very suspicious/anxious.  - ADM medicine  -Patient w/o capacity and unclear hx, recommend MRI T/L-spine w/o contrast. Patient non-cooperative and adamently refusing mri, will need to be under anesthesia  -Unable to contact family. Patient states she would never want surgery. Per psych no capacity to refuse surgery or MRI  -Unlikely 2/2 compression of the thecal sac. Bowel sxs began around time pt began to stop taking benzos and has new UTI to explain urinary sxs.  - fu neurology consult
65 year old female with PMH of anxiety, benzodiazepine and alcohol abuse, lumbar spinal stenosis s/p laminectomy who recently tapered herself off of benzodiazpines-now off entirely x 1 week now presenting with increased anxiety/tremulousness concerning for benzodiazepine withdrawal, also reporting bladder/bowel incontinence. 
65 year old female with PMH of anxiety, benzodiazepine and alcohol abuse, lumbar spinal stenosis s/p laminectomy who recently tapered herself off of benzodiazpines-now off entirely x 1 week now presenting with increased anxiety/tremulousness concerning for benzodiazepine withdrawal vs new onset psychosis, also reporting bladder/bowel incontinence. 
65 year old female with PMH of anxiety, benzodiazepine and alcohol abuse, lumbar spinal stenosis s/p laminectomy who recently tapered herself off of benzodiazpines-now off entirely x 1 week now presenting with increased anxiety/tremulousness concerning for benzodiazepine withdrawal, also reporting bladder/bowel incontinence. 
65 year old female with PMH of anxiety, benzodiazepine and alcohol abuse, lumbar spinal stenosis s/p laminectomy who recently tapered herself off of benzodiazapine admitted for benzo withdrawal and complains of new fecal/urine incontinence.

## 2021-02-16 NOTE — DISCHARGE NOTE PROVIDER - REASON FOR ADMISSION
Anxiety/Benzodiazepine withdrawal, bowel & bladder incontinence Anxiety/Benzodiazepine withdrawal, bowel&bladder incontinence

## 2021-02-16 NOTE — PROGRESS NOTE ADULT - PROBLEM SELECTOR PLAN 1
-CIWA protocol  -Psych consulted, continue CIWA but no other psychotropic medications needed at this time, continue to follow up recommendations
-CIWA protocol  -Psych consulted, stable off ciwa  -Maintain prn Ativan for agitation  -As pt with persistent psychosis need MRIb to r/o cause--per psych now has capacity to decide  will give ativan beforehand if needed
-CIWA protocol  -Psych consulted and rec Valium Q8hrs going forward.   -Maintain prn Ativan for agitation  -As pt with persistent psychosis need MRIb to r/o cause--per psych now has capacity to decide  will give ativan beforehand if needed
-CIWA protocol  -Psych consulted, can dc CIWA protocol today  -Maintain prn Ativan for agitation  -As pt with persistent psychosis need MRIb to r/o cause--no capacity to refuse and will need to consider with sedation if refusing.
-CIWA protocol  -Psych consulted, stable off ciwa  -Maintain prn Ativan for agitation  -As pt with persistent psychosis need MRIb to r/o cause--no capacity to refuse and will give ativan beforehand if needed
-CIWA protocol  -Psych consulted, will obtain B12, folate, vitamin D and TSH and continue with CIWA, follow up further recs

## 2021-02-16 NOTE — PROGRESS NOTE ADULT - REASON FOR ADMISSION
Anxiety/Benzodiazepine withdrawal, bowel&bladder incontinence

## 2021-02-16 NOTE — PROGRESS NOTE BEHAVIORAL HEALTH - RISK ASSESSMENT
Elevated in the setting of CAH, cognitive impairments, paranoia, will continue to monitor
Elevated in the setting of CAH, cognitive impairments, paranoia, will continue to monitor
pt overall low risk for suicide attempt, no si/hi, no hx attempts, future oriented, risk factors include med issues
Elevated in the setting of CAH, cognitive impairments, paranoia, will continue to monitor

## 2021-02-16 NOTE — PROGRESS NOTE ADULT - ATTENDING COMMENTS
Nusrat Key DO  [facility]  [phone]
Demario Stauffer MD  Division of Hospital Medicine  Pager: [phone]  Office: [phone]
Demario Stauffer MD  Division of Hospital Medicine  Pager: [phone]  Office: [phone]
Demario Stauffer MD  Division of Hospital Medicine  Pager: [phone]  Office: [phone]

## 2021-02-16 NOTE — DISCHARGE NOTE PROVIDER - NSDCMRMEDTOKEN_GEN_ALL_CORE_FT
Centrum oral tablet: 1 tab(s) orally once a day  Incruse Ellipta 62.5 mcg/inh inhalation powder: 1 puff(s) inhaled every 24 hours  NexIUM 24HR 20 mg oral delayed release capsule: 2 cap(s) orally once a day  Vitamin B12:   Vitamin D3:    Centrum oral tablet: 1 tab(s) orally once a day  diazePAM 2 mg oral tablet: 1 tab(s) orally every 8 hours MDD:3 tabs  Incruse Ellipta 62.5 mcg/inh inhalation powder: 1 puff(s) inhaled every 24 hours  NexIUM 24HR 20 mg oral delayed release capsule: 2 cap(s) orally once a day  nicotine 14 mg/24 hr transdermal film, extended release: 1 patch transdermal once a day   Vitamin B12:   Vitamin D3:

## 2021-02-16 NOTE — PROGRESS NOTE ADULT - SUBJECTIVE AND OBJECTIVE BOX
[facility] Division of Hospital Medicine  Demario Stauffer MD  Pager (M-F, 8A-5P): [phone]  Other Times:  [phone]    Patient is a 65y old  Female who presents with a chief complaint of Anxiety/Benzodiazepine withdrawal, bowel&bladder incontinence (13 Feb 2021 10:48)      SUBJECTIVE / OVERNIGHT EVENTS: no acute events. Again elicited paranoid delusions. States she can 'feel people in the wall' and 'knows they are there.' Endorses decreased auditory hallucinations.    ADDITIONAL REVIEW OF SYSTEMS:  no cp or sob    MEDICATIONS  (STANDING):  cefTRIAXone   IVPB 1000 milliGRAM(s) IV Intermittent every 24 hours  diazepam    Tablet 2 milliGRAM(s) Oral every 8 hours  nicotine -  14 mG/24Hr(s) Patch 1 patch Transdermal daily    MEDICATIONS  (PRN):  LORazepam   Injectable 2 milliGRAM(s) IV Push every 2 hours PRN CIWA-Ar score increase by 2 points and a total score of 7 or less  LORazepam   Injectable 2 milliGRAM(s) IV Push every 1 hour PRN CIWA-Ar score 8 or greater      CAPILLARY BLOOD GLUCOSE        I&O's Summary    12 Feb 2021 07:01  -  13 Feb 2021 07:00  --------------------------------------------------------  IN: 600 mL / OUT: 0 mL / NET: 600 mL    13 Feb 2021 07:01  -  13 Feb 2021 15:21  --------------------------------------------------------  IN: 480 mL / OUT: 0 mL / NET: 480 mL        PHYSICAL EXAM:  Vital Signs Last 24 Hrs  T(C): 36.6 (13 Feb 2021 13:33), Max: 36.7 (12 Feb 2021 20:48)  T(F): 97.9 (13 Feb 2021 13:33), Max: 98 (12 Feb 2021 20:48)  HR: 72 (13 Feb 2021 13:33) (72 - 83)  BP: 104/74 (13 Feb 2021 13:33) (104/66 - 120/79)  BP(mean): --  RR: 17 (13 Feb 2021 13:33) (17 - 18)  SpO2: 100% (13 Feb 2021 13:33) (98% - 100%)  CONSTITUTIONAL: NAD, thin, well-groomed  EYES: EOMI; conjunctiva and sclera clear  ENMT: Moist oral mucosa, no pharyngeal injection or exudates  RESPIRATORY: Normal respiratory effort; lungs are clear to auscultation bilaterally  CARDIOVASCULAR: Regular rate and rhythm, normal S1 and S2, no murmur/rub/gallop; No lower extremity edema  ABDOMEN: Nontender to palpation, normoactive bowel sounds, no rebound/guarding; No hepatosplenomegaly  PSYCH: A+O to person, place, and time; affect guarded, auditory hallucinations and paranoid delusions elicited  NEUROLOGY: moving all extremities; no gross sensory deficits   SKIN: No rashes; no palpable lesions    LABS:                        13.7   8.77  )-----------( 256      ( 12 Feb 2021 06:40 )             40.6     02-12    138  |  105  |  34<H>  ----------------------------<  83  3.7   |  22  |  0.98    Ca    9.3      12 Feb 2021 06:38                Culture - Urine (collected 11 Feb 2021 17:11)  Source: .Urine Catheterized  Final Report (12 Feb 2021 17:15):    <10,000 CFU/mL Normal Urogenital Joellen        RADIOLOGY & ADDITIONAL TESTS:  Results Reviewed:   Imaging Personally Reviewed:  Electrocardiogram Personally Reviewed:    COORDINATION OF CARE:  Care Discussed with Consultants/Other Providers [Y/N]:  Prior or Outpatient Records Reviewed [Y/N]:  
PROGRESS NOTE:     Patient is a 65y old  Female who presents with a chief complaint of Anxiety/Benzodiazepine withdrawal, bowel & bladder incontinence (16 Feb 2021 09:47)      SUBJECTIVE / OVERNIGHT EVENTS: Still anxious saying she may consider doing MR Lumbar only with some sedation.  Discussed that if she refuses this then will have to defer to outpt as psych says she has capacity to refuse.     ADDITIONAL REVIEW OF SYSTEMS:  No fever or chills  No n/v/d    MEDICATIONS  (STANDING):  diazepam    Tablet 2 milliGRAM(s) Oral every 8 hours  mirtazapine 7.5 milliGRAM(s) Oral at bedtime  nicotine -  14 mG/24Hr(s) Patch 1 patch Transdermal daily    MEDICATIONS  (PRN):  LORazepam   Injectable 1 milliGRAM(s) IV Push every 4 hours PRN Agitation      CAPILLARY BLOOD GLUCOSE        I&O's Summary    15 Feb 2021 07:01  -  16 Feb 2021 07:00  --------------------------------------------------------  IN: 900 mL / OUT: 0 mL / NET: 900 mL    16 Feb 2021 07:01  -  16 Feb 2021 13:34  --------------------------------------------------------  IN: 120 mL / OUT: 0 mL / NET: 120 mL        PHYSICAL EXAM:  Vital Signs Last 24 Hrs  T(C): 36.6 (16 Feb 2021 12:24), Max: 37.2 (15 Feb 2021 20:57)  T(F): 97.8 (16 Feb 2021 12:24), Max: 99 (15 Feb 2021 20:57)  HR: 86 (16 Feb 2021 12:24) (78 - 86)  BP: 125/89 (16 Feb 2021 12:24) (117/78 - 125/89)  BP(mean): --  RR: 18 (16 Feb 2021 12:24) (18 - 18)  SpO2: 98% (16 Feb 2021 12:24) (97% - 99%)    CONSTITUTIONAL: NAD, well-developed  RESPIRATORY: Normal respiratory effort; lungs are clear to auscultation bilaterally  CARDIOVASCULAR: Regular rate and rhythm, normal S1 and S2, no murmur/rub/gallop; No lower extremity edema; Peripheral pulses are 2+ bilaterally  ABDOMEN: Nontender to palpation, normoactive bowel sounds, no rebound/guarding; No hepatosplenomegaly  MUSCLOSKELETAL: no clubbing or cyanosis of digits; no joint swelling or tenderness to palpation  PSYCH: A+O to person, place, and time; affect anxious    LABS:                      RADIOLOGY & ADDITIONAL TESTS:  Results Reviewed:   Imaging Personally Reviewed:  Electrocardiogram Personally Reviewed:    COORDINATION OF CARE:  Care Discussed with Consultants/Other Providers [Y/N]:  Prior or Outpatient Records Reviewed [Y/N]:  
Patient seen and examined at bedside.    --Anticoagulation--    T(C): 36.3 (02-13-21 @ 08:59), Max: 36.7 (02-12-21 @ 20:48)  HR: 77 (02-13-21 @ 08:59) (77 - 83)  BP: 115/80 (02-13-21 @ 08:59) (104/66 - 120/79)  RR: 18 (02-13-21 @ 08:59) (17 - 18)  SpO2: 99% (02-13-21 @ 08:59) (98% - 99%)  Wt(kg): --    Exam:  Stable    Imaging:   no new imaging    Labs:                         13.7   8.77  )-----------( 256      ( 12 Feb 2021 06:40 )             40.6     02-12    138  |  105  |  34<H>  ----------------------------<  83  3.7   |  22  |  0.98    Ca    9.3      12 Feb 2021 06:38    
[facility] Division of Hospital Medicine  Reuben Santa DO  Pager (ISABEL, 6O-5P): [phone]  Other Times:  [phone]    Patient is a 65y old  Female who presents with a chief complaint of Anxiety/Benzodiazepine withdrawal, bowel&bladder incontinence (2021 02:51)    SUBJECTIVE / OVERNIGHT EVENTS: Patient admitted overnight for drug withdrawal as well as bowel/bladder incontinence. She is currently endorsing auditory hallucinations and states that voices have been telling her to do things like waking up but also states that they pushed her around the house. She states that she has been incontinent of bowel and bladder for weeks.    REVIEW OF SYSTEMS:    CONSTITUTIONAL: No weakness, fevers or chills  EYES/ENT: No visual changes;  No vertigo or throat pain   NECK: No pain or stiffness  RESPIRATORY: No cough, wheezing, hemoptysis; No shortness of breath  CARDIOVASCULAR: No chest pain or palpitations  GASTROINTESTINAL: No abdominal or epigastric pain. No nausea, vomiting, or hematemesis; No diarrhea or constipation. No melena or hematochezia.  GENITOURINARY: No dysuria, frequency or hematuria  NEUROLOGICAL: No numbness or weakness but endorses bowel and bladder incontinence  SKIN: No itching, burning, rashes, or lesions  MSK: No joint pain, no back pain  HEME: No easy bleeding, no easy bruising  PSYCH: Endorses auditory hallucinations  All other review of systems is negative unless indicated above.    MEDICATIONS  (STANDING):  cefTRIAXone   IVPB 1000 milliGRAM(s) IV Intermittent every 24 hours  nicotine -  14 mG/24Hr(s) Patch 1 patch Transdermal daily    MEDICATIONS  (PRN):  LORazepam   Injectable 2 milliGRAM(s) IV Push every 2 hours PRN CIWA-Ar score increase by 2 points and a total score of 7 or less  LORazepam   Injectable 2 milliGRAM(s) IV Push every 1 hour PRN CIWA-Ar score 8 or greater      CAPILLARY BLOOD GLUCOSE        I&O's Summary      PHYSICAL EXAM:  Vital Signs Last 24 Hrs  T(C): 36.2 (2021 11:11), Max: 37 (10 Feb 2021 19:15)  T(F): 97.1 (2021 11:11), Max: 98.6 (10 Feb 2021 19:15)  HR: 68 (2021 11:11) (64 - 104)  BP: 121/83 (2021 11:11) (109/72 - 132/83)  BP(mean): --  RR: 18 (2021 11:11) (18 - 22)  SpO2: 100% (2021 11:11) (98% - 100%)    CONSTITUTIONAL: NAD, appears older than stated age  RESPIRATORY: Normal respiratory effort; lungs are clear to auscultation bilaterally  CARDIOVASCULAR: Regular rate and rhythm, normal S1 and S2, no murmur/rub/gallop; No lower extremity edema; Peripheral pulses are 2+ bilaterally  ABDOMEN: Nontender to palpation, normoactive bowel sounds, no rebound/guarding  MUSCULOSKELETAL: No clubbing or cyanosis of digits; no joint swelling or tenderness to palpation  PSYCH: A+O to person, place, and time; odd affect, endorsing auditory hallucinations  NEUROLOGY: CN 2-12 are intact and symmetric; no gross sensory deficits   SKIN: No rashes; no palpable lesions    LABS:                        14.1   8.69  )-----------( 274      ( 10 Feb 2021 20:06 )             42.6     02-10    137  |  99  |  30<H>  ----------------------------<  89  3.4<L>   |  23  |  1.05    Ca    9.4      10 Feb 2021 20:06    TPro  7.1  /  Alb  4.4  /  TBili  0.8  /  DBili  x   /  AST  31  /  ALT  23  /  AlkPhos  53  02-10          Urinalysis Basic - ( 2021 00:40 )    Color: Yellow / Appearance: Clear / S.033 / pH: x  Gluc: x / Ketone: Moderate  / Bili: Negative / Urobili: 2 mg/dL   Blood: x / Protein: 30 mg/dL / Nitrite: Negative   Leuk Esterase: Large / RBC: 1 /hpf / WBC 27 /HPF   Sq Epi: x / Non Sq Epi: 6 /hpf / Bacteria: Negative  
[facility] Division of Hospital Medicine  Demario Stauffer MD  Pager (M-F, 8A-5P): [phone]  Other Times:  [phone]    Patient is a 65y old  Female who presents with a chief complaint of Anxiety/Benzodiazepine withdrawal, bowel&bladder incontinence (14 Feb 2021 14:34)      SUBJECTIVE / OVERNIGHT EVENTS: NAEON. This AM command auditory hallucinations are gone. No longer feels people are watching her through the walls but worries 'they are still at home.'    ADDITIONAL REVIEW OF SYSTEMS:  no cp, sob    MEDICATIONS  (STANDING):  diazepam    Tablet 2 milliGRAM(s) Oral every 8 hours  mirtazapine 7.5 milliGRAM(s) Oral at bedtime  nicotine -  14 mG/24Hr(s) Patch 1 patch Transdermal daily    MEDICATIONS  (PRN):  LORazepam   Injectable 1 milliGRAM(s) IV Push every 4 hours PRN Agitation      CAPILLARY BLOOD GLUCOSE        I&O's Summary    14 Feb 2021 07:01  -  15 Feb 2021 07:00  --------------------------------------------------------  IN: 580 mL / OUT: 500 mL / NET: 80 mL    15 Feb 2021 07:01  -  15 Feb 2021 15:57  --------------------------------------------------------  IN: 480 mL / OUT: 0 mL / NET: 480 mL        PHYSICAL EXAM:  Vital Signs Last 24 Hrs  T(C): 36.6 (15 Feb 2021 12:17), Max: 36.8 (15 Feb 2021 00:08)  T(F): 97.9 (15 Feb 2021 12:17), Max: 98.3 (15 Feb 2021 00:08)  HR: 73 (15 Feb 2021 12:17) (68 - 93)  BP: 105/73 (15 Feb 2021 12:17) (105/73 - 132/88)  BP(mean): --  RR: 18 (15 Feb 2021 12:17) (18 - 18)  SpO2: 98% (15 Feb 2021 12:17) (95% - 99%)  CONSTITUTIONAL: NAD, well-developed, well-groomed  EYES: EOMI; conjunctiva and sclera clear  ENMT: Moist oral mucosa, no pharyngeal injection or exudates  RESPIRATORY: Normal respiratory effort; lungs are clear to auscultation bilaterally  CARDIOVASCULAR: Regular rate and rhythm, normal S1 and S2, no murmur/rub/gallop; No lower extremity edema  ABDOMEN: Nontender to palpation, normoactive bowel sounds, no rebound/guarding; No hepatosplenomegaly  PSYCH: A+O to person, place, and time; affect appropriate  NEUROLOGY: moving all extremities; no gross sensory deficits   SKIN: No rashes; no palpable lesions    LABS:                      RADIOLOGY & ADDITIONAL TESTS:  Results Reviewed:   Imaging Personally Reviewed:  Electrocardiogram Personally Reviewed:    COORDINATION OF CARE:  Care Discussed with Consultants/Other Providers [Y/N]:  Prior or Outpatient Records Reviewed [Y/N]:  
[facility] Division of Hospital Medicine  Reuben Santa DO  Pager (ISABEL, 7M-1X): [phone]  Other Times:  [phone]    Patient is a 65y old  Female who presents with a chief complaint of Anxiety/Benzodiazepine withdrawal, bowel&bladder incontinence (2021 13:13)    SUBJECTIVE / OVERNIGHT EVENTS: No acute events overnight. Patient seen and examined at bedside this morning, still endorses bladder and bowel incontinence, now reports auditory hallucinations have stopped since coming to the hospital.    REVIEW OF SYSTEMS:    CONSTITUTIONAL: No weakness, fevers or chills  EYES/ENT: No visual changes;  No vertigo or throat pain   NECK: No pain or stiffness  RESPIRATORY: No cough, wheezing, hemoptysis; No shortness of breath  CARDIOVASCULAR: No chest pain or palpitations  GASTROINTESTINAL: No abdominal or epigastric pain. Endorses bowel incontinence  GENITOURINARY: No dysuria, frequency or hematuria but endorses bladder incontinence  NEUROLOGICAL: No numbness or weakness  SKIN: No itching, burning, rashes, or lesions  MSK: No joint pain, no back pain  HEME: No easy bleeding, no easy bruising  PSYCH: No longer having auditory hallucinations  All other review of systems is negative unless indicated above.    MEDICATIONS  (STANDING):  cefTRIAXone   IVPB 1000 milliGRAM(s) IV Intermittent every 24 hours  nicotine -  14 mG/24Hr(s) Patch 1 patch Transdermal daily    MEDICATIONS  (PRN):  LORazepam   Injectable 2 milliGRAM(s) IV Push every 2 hours PRN CIWA-Ar score increase by 2 points and a total score of 7 or less  LORazepam   Injectable 2 milliGRAM(s) IV Push every 1 hour PRN CIWA-Ar score 8 or greater      CAPILLARY BLOOD GLUCOSE        I&O's Summary    2021 07:01  -  2021 07:00  --------------------------------------------------------  IN: 0 mL / OUT: 60 mL / NET: -60 mL    2021 07:01  -  2021 11:43  --------------------------------------------------------  IN: 120 mL / OUT: 0 mL / NET: 120 mL        PHYSICAL EXAM:  Vital Signs Last 24 Hrs  T(C): 36.7 (2021 10:32), Max: 37.3 (2021 00:33)  T(F): 98 (2021 10:32), Max: 99.1 (2021 00:33)  HR: 83 (2021 10:32) (78 - 87)  BP: 123/71 (2021 10:32) (109/70 - 126/87)  BP(mean): --  RR: 18 (2021 10:32) (18 - 18)  SpO2: 98% (2021 10:32) (97% - 99%)    CONSTITUTIONAL: NAD, laying in bed comfortably  RESPIRATORY: Normal respiratory effort; lungs are clear to auscultation bilaterally  CARDIOVASCULAR: Regular rate and rhythm, normal S1 and S2, no murmur/rub/gallop; No lower extremity edema; Peripheral pulses are 2+ bilaterally  ABDOMEN: Scaphoid abdomen, nontender to palpation, normoactive bowel sounds, no rebound/guarding  MUSCULOSKELETAL: No clubbing or cyanosis of digits; no joint swelling or tenderness to palpation  PSYCH: A+O to person, place, and time; odd affect, endorsing auditory hallucinations have stopped since coming to the hospital  NEUROLOGY: CN 2-12 are intact and symmetric; no gross sensory deficits   SKIN: No rashes; no palpable lesions    LABS:                        13.7   8.77  )-----------( 256      ( 2021 06:40 )             40.6     02-12    138  |  105  |  34<H>  ----------------------------<  83  3.7   |  22  |  0.98    Ca    9.3      2021 06:38    TPro  7.1  /  Alb  4.4  /  TBili  0.8  /  DBili  x   /  AST  31  /  ALT  23  /  AlkPhos  53  02-10          Urinalysis Basic - ( 2021 00:40 )    Color: Yellow / Appearance: Clear / S.033 / pH: x  Gluc: x / Ketone: Moderate  / Bili: Negative / Urobili: 2 mg/dL   Blood: x / Protein: 30 mg/dL / Nitrite: Negative   Leuk Esterase: Large / RBC: 1 /hpf / WBC 27 /HPF   Sq Epi: x / Non Sq Epi: 6 /hpf / Bacteria: Negative  
[facility] Division of Hospital Medicine  Demario Stauffer MD  Pager (M-F, 8A-5P): [phone]  Other Times:  [phone]    Patient is a 65y old  Female who presents with a chief complaint of Anxiety/Benzodiazepine withdrawal, bowel&bladder incontinence (13 Feb 2021 15:21)      SUBJECTIVE / OVERNIGHT EVENTS: Still endorsing paranoid delusions of people 'in the walls.' Command auditory hallucinations are reduced. Remains cautious of MRI.    ADDITIONAL REVIEW OF SYSTEMS:  no cp or sob.  MEDICATIONS  (STANDING):  diazepam    Tablet 2 milliGRAM(s) Oral every 8 hours  mirtazapine 7.5 milliGRAM(s) Oral at bedtime  nicotine -  14 mG/24Hr(s) Patch 1 patch Transdermal daily    MEDICATIONS  (PRN):  LORazepam   Injectable 1 milliGRAM(s) IV Push every 4 hours PRN Agitation      CAPILLARY BLOOD GLUCOSE        I&O's Summary    13 Feb 2021 07:01  -  14 Feb 2021 07:00  --------------------------------------------------------  IN: 720 mL / OUT: 0 mL / NET: 720 mL    14 Feb 2021 07:01  -  14 Feb 2021 14:42  --------------------------------------------------------  IN: 80 mL / OUT: 0 mL / NET: 80 mL        PHYSICAL EXAM:  Vital Signs Last 24 Hrs  T(C): 37 (14 Feb 2021 12:57), Max: 37.2 (13 Feb 2021 20:45)  T(F): 98.6 (14 Feb 2021 12:57), Max: 98.9 (13 Feb 2021 20:45)  HR: 72 (14 Feb 2021 12:57) (72 - 83)  BP: 109/77 (14 Feb 2021 12:57) (109/77 - 129/86)  BP(mean): --  RR: 18 (14 Feb 2021 12:57) (18 - 18)  SpO2: 98% (14 Feb 2021 12:57) (98% - 99%)  CONSTITUTIONAL: NAD, thin, well-groomed  EYES: EOMI; conjunctiva and sclera clear  ENMT: Moist oral mucosa, no pharyngeal injection or exudates  RESPIRATORY: Normal respiratory effort; lungs are clear to auscultation bilaterally  CARDIOVASCULAR: Regular rate and rhythm, normal S1 and S2, no murmur/rub/gallop; No lower extremity edema  ABDOMEN: Nontender to palpation, normoactive bowel sounds, no rebound/guarding; No hepatosplenomegaly  PSYCH: A+O to person, place, and time; affect guarded, auditory hallucinations and paranoid delusions elicited  NEUROLOGY: moving all extremities; no gross sensory deficits   SKIN: No rashes; no palpable lesions    LABS:                    Culture - Urine (collected 11 Feb 2021 17:11)  Source: .Urine Catheterized  Final Report (12 Feb 2021 17:15):    <10,000 CFU/mL Normal Urogenital Joellen        RADIOLOGY & ADDITIONAL TESTS:  Results Reviewed:   Imaging Personally Reviewed:  Electrocardiogram Personally Reviewed:    COORDINATION OF CARE:  Care Discussed with Consultants/Other Providers [Y/N]:  Prior or Outpatient Records Reviewed [Y/N]:

## 2021-02-16 NOTE — PROGRESS NOTE BEHAVIORAL HEALTH - NSBHFUPINTERVALHXFT_PSY_A_CORE
pt states she has been feeling anxious due to medical issues, being in the hospital. denies panic attacks. pt feels slightly depressed, no si/hi. no psychosis. Patient denies and also does not manifest any benzodiazepine/substance withdrawal sxs. Patient did not need any Ativan / benzo PRNs in last 24 hrs.  pt does not want to take remeron, had adverse effects in the past.

## 2021-03-01 ENCOUNTER — OUTPATIENT (OUTPATIENT)
Dept: OUTPATIENT SERVICES | Facility: HOSPITAL | Age: 66
LOS: 1 days | End: 2021-03-01
Payer: MEDICARE

## 2021-03-01 DIAGNOSIS — Z82.8 FAMILY HISTORY OF OTHER DISABILITIES AND CHRONIC DISEASES LEADING TO DISABLEMENT, NOT ELSEWHERE CLASSIFIED: Chronic | ICD-10-CM

## 2021-03-01 DIAGNOSIS — Z98.890 OTHER SPECIFIED POSTPROCEDURAL STATES: Chronic | ICD-10-CM

## 2021-03-20 ENCOUNTER — INPATIENT (INPATIENT)
Facility: HOSPITAL | Age: 66
LOS: 4 days | Discharge: HOME HEALTH | DRG: 811 | End: 2021-03-25
Attending: INTERNAL MEDICINE | Admitting: STUDENT IN AN ORGANIZED HEALTH CARE EDUCATION/TRAINING PROGRAM
Payer: MEDICARE

## 2021-03-20 VITALS
HEIGHT: 64 IN | DIASTOLIC BLOOD PRESSURE: 91 MMHG | RESPIRATION RATE: 20 BRPM | OXYGEN SATURATION: 99 % | WEIGHT: 95.02 LBS | TEMPERATURE: 99 F | SYSTOLIC BLOOD PRESSURE: 150 MMHG | HEART RATE: 74 BPM

## 2021-03-20 DIAGNOSIS — Z82.8 FAMILY HISTORY OF OTHER DISABILITIES AND CHRONIC DISEASES LEADING TO DISABLEMENT, NOT ELSEWHERE CLASSIFIED: Chronic | ICD-10-CM

## 2021-03-20 DIAGNOSIS — Z98.890 OTHER SPECIFIED POSTPROCEDURAL STATES: Chronic | ICD-10-CM

## 2021-03-20 DIAGNOSIS — R58 HEMORRHAGE, NOT ELSEWHERE CLASSIFIED: ICD-10-CM

## 2021-03-20 LAB
ALBUMIN SERPL ELPH-MCNC: 3 G/DL — LOW (ref 3.3–5)
ALP SERPL-CCNC: 51 U/L — SIGNIFICANT CHANGE UP (ref 40–120)
ALT FLD-CCNC: 14 U/L — SIGNIFICANT CHANGE UP (ref 10–45)
ANION GAP SERPL CALC-SCNC: 10 MMOL/L — SIGNIFICANT CHANGE UP (ref 5–17)
APPEARANCE UR: ABNORMAL
APTT BLD: 27.4 SEC — LOW (ref 27.5–35.5)
AST SERPL-CCNC: 17 U/L — SIGNIFICANT CHANGE UP (ref 10–40)
BACTERIA # UR AUTO: NEGATIVE — SIGNIFICANT CHANGE UP
BASE EXCESS BLDV CALC-SCNC: 5.9 MMOL/L — HIGH (ref -2–2)
BASOPHILS # BLD AUTO: 0.03 K/UL — SIGNIFICANT CHANGE UP (ref 0–0.2)
BASOPHILS NFR BLD AUTO: 0.4 % — SIGNIFICANT CHANGE UP (ref 0–2)
BILIRUB SERPL-MCNC: 0.4 MG/DL — SIGNIFICANT CHANGE UP (ref 0.2–1.2)
BILIRUB UR-MCNC: NEGATIVE — SIGNIFICANT CHANGE UP
BLD GP AB SCN SERPL QL: NEGATIVE — SIGNIFICANT CHANGE UP
BUN SERPL-MCNC: 13 MG/DL — SIGNIFICANT CHANGE UP (ref 7–23)
CA-I SERPL-SCNC: 1.19 MMOL/L — SIGNIFICANT CHANGE UP (ref 1.12–1.3)
CALCIUM SERPL-MCNC: 8.8 MG/DL — SIGNIFICANT CHANGE UP (ref 8.4–10.5)
CHLORIDE BLDV-SCNC: 104 MMOL/L — SIGNIFICANT CHANGE UP (ref 96–108)
CHLORIDE SERPL-SCNC: 101 MMOL/L — SIGNIFICANT CHANGE UP (ref 96–108)
CO2 BLDV-SCNC: 32 MMOL/L — HIGH (ref 22–30)
CO2 SERPL-SCNC: 28 MMOL/L — SIGNIFICANT CHANGE UP (ref 22–31)
COLOR SPEC: ABNORMAL
CREAT SERPL-MCNC: 0.88 MG/DL — SIGNIFICANT CHANGE UP (ref 0.5–1.3)
DIFF PNL FLD: ABNORMAL
EOSINOPHIL # BLD AUTO: 0.03 K/UL — SIGNIFICANT CHANGE UP (ref 0–0.5)
EOSINOPHIL NFR BLD AUTO: 0.4 % — SIGNIFICANT CHANGE UP (ref 0–6)
EPI CELLS # UR: 0 /HPF — SIGNIFICANT CHANGE UP
GAS PNL BLDV: 138 MMOL/L — SIGNIFICANT CHANGE UP (ref 135–145)
GAS PNL BLDV: SIGNIFICANT CHANGE UP
GAS PNL BLDV: SIGNIFICANT CHANGE UP
GLUCOSE BLDV-MCNC: 125 MG/DL — HIGH (ref 70–99)
GLUCOSE SERPL-MCNC: 124 MG/DL — HIGH (ref 70–99)
GLUCOSE UR QL: NEGATIVE — SIGNIFICANT CHANGE UP
HCO3 BLDV-SCNC: 31 MMOL/L — HIGH (ref 21–29)
HCT VFR BLD CALC: 33.6 % — LOW (ref 34.5–45)
HCT VFR BLDA CALC: 35 % — LOW (ref 39–50)
HGB BLD CALC-MCNC: 11.3 G/DL — LOW (ref 11.5–15.5)
HGB BLD-MCNC: 10.9 G/DL — LOW (ref 11.5–15.5)
IMM GRANULOCYTES NFR BLD AUTO: 0.7 % — SIGNIFICANT CHANGE UP (ref 0–1.5)
INR BLD: 1.08 RATIO — SIGNIFICANT CHANGE UP (ref 0.88–1.16)
KETONES UR-MCNC: NEGATIVE — SIGNIFICANT CHANGE UP
LACTATE BLDV-MCNC: 1.7 MMOL/L — SIGNIFICANT CHANGE UP (ref 0.7–2)
LEUKOCYTE ESTERASE UR-ACNC: NEGATIVE — SIGNIFICANT CHANGE UP
LYMPHOCYTES # BLD AUTO: 1.59 K/UL — SIGNIFICANT CHANGE UP (ref 1–3.3)
LYMPHOCYTES # BLD AUTO: 21.4 % — SIGNIFICANT CHANGE UP (ref 13–44)
MCHC RBC-ENTMCNC: 30.3 PG — SIGNIFICANT CHANGE UP (ref 27–34)
MCHC RBC-ENTMCNC: 32.4 GM/DL — SIGNIFICANT CHANGE UP (ref 32–36)
MCV RBC AUTO: 93.3 FL — SIGNIFICANT CHANGE UP (ref 80–100)
MONOCYTES # BLD AUTO: 0.5 K/UL — SIGNIFICANT CHANGE UP (ref 0–0.9)
MONOCYTES NFR BLD AUTO: 6.7 % — SIGNIFICANT CHANGE UP (ref 2–14)
NEUTROPHILS # BLD AUTO: 5.23 K/UL — SIGNIFICANT CHANGE UP (ref 1.8–7.4)
NEUTROPHILS NFR BLD AUTO: 70.4 % — SIGNIFICANT CHANGE UP (ref 43–77)
NITRITE UR-MCNC: NEGATIVE — SIGNIFICANT CHANGE UP
NRBC # BLD: 0 /100 WBCS — SIGNIFICANT CHANGE UP (ref 0–0)
PCO2 BLDV: 47 MMHG — SIGNIFICANT CHANGE UP (ref 35–50)
PH BLDV: 7.43 — SIGNIFICANT CHANGE UP (ref 7.35–7.45)
PH UR: 7.5 — SIGNIFICANT CHANGE UP (ref 5–8)
PLATELET # BLD AUTO: 351 K/UL — SIGNIFICANT CHANGE UP (ref 150–400)
PO2 BLDV: 26 MMHG — SIGNIFICANT CHANGE UP (ref 25–45)
POTASSIUM BLDV-SCNC: 2.9 MMOL/L — CRITICAL LOW (ref 3.5–5.3)
POTASSIUM SERPL-MCNC: 3.7 MMOL/L — SIGNIFICANT CHANGE UP (ref 3.5–5.3)
POTASSIUM SERPL-SCNC: 3.7 MMOL/L — SIGNIFICANT CHANGE UP (ref 3.5–5.3)
PROT SERPL-MCNC: 6 G/DL — SIGNIFICANT CHANGE UP (ref 6–8.3)
PROT UR-MCNC: ABNORMAL
PROTHROM AB SERPL-ACNC: 12.9 SEC — SIGNIFICANT CHANGE UP (ref 10.6–13.6)
RBC # BLD: 3.6 M/UL — LOW (ref 3.8–5.2)
RBC # FLD: 12.5 % — SIGNIFICANT CHANGE UP (ref 10.3–14.5)
RBC CASTS # UR COMP ASSIST: 1633 /HPF — HIGH (ref 0–4)
RH IG SCN BLD-IMP: POSITIVE — SIGNIFICANT CHANGE UP
SAO2 % BLDV: 46 % — LOW (ref 67–88)
SARS-COV-2 RNA SPEC QL NAA+PROBE: SIGNIFICANT CHANGE UP
SODIUM SERPL-SCNC: 139 MMOL/L — SIGNIFICANT CHANGE UP (ref 135–145)
SP GR SPEC: 1.02 — SIGNIFICANT CHANGE UP (ref 1.01–1.02)
UROBILINOGEN FLD QL: NEGATIVE — SIGNIFICANT CHANGE UP
WBC # BLD: 7.43 K/UL — SIGNIFICANT CHANGE UP (ref 3.8–10.5)
WBC # FLD AUTO: 7.43 K/UL — SIGNIFICANT CHANGE UP (ref 3.8–10.5)
WBC UR QL: 8 /HPF — HIGH (ref 0–5)

## 2021-03-20 PROCEDURE — 71045 X-RAY EXAM CHEST 1 VIEW: CPT | Mod: 26

## 2021-03-20 PROCEDURE — 99285 EMERGENCY DEPT VISIT HI MDM: CPT

## 2021-03-20 RX ORDER — SODIUM CHLORIDE 9 MG/ML
1000 INJECTION INTRAMUSCULAR; INTRAVENOUS; SUBCUTANEOUS ONCE
Refills: 0 | Status: COMPLETED | OUTPATIENT
Start: 2021-03-20 | End: 2021-03-20

## 2021-03-20 RX ADMIN — SODIUM CHLORIDE 1000 MILLILITER(S): 9 INJECTION INTRAMUSCULAR; INTRAVENOUS; SUBCUTANEOUS at 16:57

## 2021-03-20 RX ADMIN — SODIUM CHLORIDE 1000 MILLILITER(S): 9 INJECTION INTRAMUSCULAR; INTRAVENOUS; SUBCUTANEOUS at 18:37

## 2021-03-20 NOTE — ED ADULT NURSE REASSESSMENT NOTE - NS ED NURSE REASSESS COMMENT FT1
Pt AAOx3. VSS. No c/o pain or discomfort. Pt states she is unable to provide urine sample at this time. right 18g IVL site patent and wdl. Pt admitted and pending bed placement. Call bell at the bedside.

## 2021-03-20 NOTE — PATIENT PROFILE ADULT - VISION (WITH CORRECTIVE LENSES IF THE PATIENT USUALLY WEARS THEM):
cannot see very well without glasses, not present with patient/Partially impaired: cannot see medication labels or newsprint, but can see obstacles in path, and the surrounding layout; can count fingers at arm's length

## 2021-03-20 NOTE — ED PROVIDER NOTE - NS ED ROS FT
CONSTITUTIONAL - No fever, No diaphoresis, No weight change  SKIN - No rash  HEMATOLOGIC - No abnormal bleeding or bruising  EYES - No eye pain, No blurred vision  ENT - No change in hearing, No sore throat, No neck pain, No rhinorrhea, No ear pain  RESPIRATORY - No shortness of breath, No cough  CARDIAC -No chest pain, No palpitations  GI +abdominal pain, No nausea, No vomiting, No diarrhea, No constipation   +vaginal bleeding   MUSCULOSKELETAL - No joint pain, No swelling, +back pain  NEUROLOGIC - No numbness, No focal weakness, No headache, No dizziness CONSTITUTIONAL - No fever, No diaphoresis, No weight change  SKIN - No rash  HEMATOLOGIC - No abnormal bleeding or bruising  EYES - No eye pain, No blurred vision  ENT - No change in hearing, No sore throat, No neck pain, No rhinorrhea, No ear pain  RESPIRATORY - No shortness of breath, No cough  CARDIAC -No chest pain, No palpitations  GI +mild abdominal pain, No nausea, No vomiting, No diarrhea, No constipation   +vaginal bleeding   MUSCULOSKELETAL - No joint pain, No swelling, +back pain  NEUROLOGIC - No numbness, No focal weakness, No headache, No dizziness

## 2021-03-20 NOTE — ED PROVIDER NOTE - IV ALTEPLASE ADMIN OUTSIDE HIDDEN
Patient is a 56y old  Male who presents with a chief complaint of Dizziness (08 Mar 2018 21:02)      HPI:  55 yo M with PMHx of pituitary tumor s/p removal, acromegaly, CAD s/p stents, DLD, DMII, neuropathy, CKD 5 presents with dizziness X2 days. Pt felt dizziness the night before presentation, pt was walking back after going to the bathroom and felt dizzy. Pt described it as a spinning sensation associated with imbalance and blurry vision lasting about 1-2 mins. Pt had another episode of dizziness the day of presentation. Again, he was walking back after urinating, denies straining, felt dizziness on his way back. However, pt felt to his legs this time and called an ambulance to the hospital. Pt denies headache, fever, chills, N/V/D, SOB, CP, abdominal pain, leg pain, dysuria, blood in urine or stool.   Pt was found to have pH 7.24 Cr 7.5 and Bicarb of 15 in ED. Pt has a baseline cr of 6.64 (7/2017), follows up with Dr. Jones regularly and states that he stills make urine. Pt refused ortiz in the ED  Pt was found to have chronic appearing DVT in LLE.     In ED: 1 L QUINN benavidez. (08 Mar 2018 21:02)      PAST MEDICAL & SURGICAL HISTORY:  Dyslipidemia  DM (diabetes mellitus), type 2  Neuropathy  HTN (hypertension)  CAD (coronary atherosclerotic disease)  Acromegaly  CKD (chronic kidney disease), stage V  H/O eye surgery  H/O: pituitary tumor: s/p removal      SOCIAL HX:  never a smoker                              FAMILY HISTORY:  Family history of myocardial infarction (Father)    Allergies    bacitracin (Unknown)  Neosporin (Hypotension)            PHYSICAL EXAM  Vital Signs Last 24 Hrs  T(C): 36.6 (09 Mar 2018 14:45), Max: 36.6 (08 Mar 2018 20:04)  T(F): 97.8 (09 Mar 2018 14:45), Max: 97.9 (08 Mar 2018 20:04)  HR: 55 (09 Mar 2018 14:45) (54 - 55)  BP: 162/82 (09 Mar 2018 14:45) (151/75 - 186/91)  BP(mean): --  RR: 18 (09 Mar 2018 14:45) (18 - 18)  SpO2: 99% (08 Mar 2018 20:04) (99% - 99%)    General:    HEENT: AAO x3, malampati 2           Lymph Nodes: No lymphadenapathy  Neck:  Supple  Lungs: Clear bilaterally  Cardiovascular: S1S2  Abdomen: Soft, non tender  Extremities: NO edema or cyanosis  Skin: Intact      03-08-18 @ 07:01  -  03-09-18 @ 07:00  --------------------------------------------------------  IN: 450 mL / OUT: 0 mL / NET: 450 mL    03-09-18 @ 07:01  -  03-09-18 @ 17:02  --------------------------------------------------------  IN: 400 mL / OUT: 1201 mL / NET: -801 mL          LAB:                        8.0    9.68  )-----------( 203      ( 09 Mar 2018 06:33 )             25.1                                               03-09    136  |  109  |  67<HH>  ----------------------------<  95  4.0   |  15<L>  |  6.6<HH>    Ca    7.9<L>      09 Mar 2018 06:33  Mg     1.7     03-09    TPro  5.6<L>  /  Alb  3.4  /  TBili  0.5  /  DBili  x   /  AST  11  /  ALT  10  /  AlkPhos  97  03-08                                                     CARDIAC MARKERS ( 09 Mar 2018 00:36 )  0.03 ng/mL / x     / 141 U/L / x     / 5.9 ng/mL  CARDIAC MARKERS ( 08 Mar 2018 15:20 )  0.03 ng/mL / x     / x     / x     / x                                                LIVER FUNCTIONS - ( 08 Mar 2018 15:20 )  Alb: 3.4 g/dL / Pro: 5.6 g/dL / ALK PHOS: 97 U/L / ALT: 10 U/L / AST: 11 U/L / GGT: x                                                                                                MEDICATIONS  (STANDING):  amLODIPine   Tablet 10 milliGRAM(s) Oral daily  aspirin 325 milliGRAM(s) Oral daily  ATENolol  Tablet 50 milliGRAM(s) Oral daily  atorvastatin 40 milliGRAM(s) Oral at bedtime  calcitriol   Capsule 0.25 MICROGram(s) Oral every other day  cefTRIAXone   IVPB      clopidogrel Tablet 75 milliGRAM(s) Oral daily  furosemide   Injectable 40 milliGRAM(s) IV Push every 12 hours  sodium bicarbonate  Infusion 0.05 mEq/kG/Hr (75 mL/Hr) IV Continuous <Continuous>  tamsulosin 0.4 milliGRAM(s) Oral daily    MEDICATIONS  (PRN):  meclizine 12.5 milliGRAM(s) Oral three times a day PRN Dizziness show

## 2021-03-20 NOTE — ED PROVIDER NOTE - PHYSICAL EXAMINATION
CONSTITUTIONAL: Well-developed; well-nourished; in no acute distress.   SKIN: warm, dry  HEAD: Normocephalic; atraumatic.  EYES: no conjunctival injection.   ENT: No nasal discharge; airway clear.  NECK: Supple; non tender.  CARD: S1, S2 normal; no murmurs, gallops, or rubs. Regular rate and rhythm.   RESP: No wheezes, rales or rhonchi. Good air movement bilaterally.   ABD: TTP LLQ. + L CVA tenderness   EXT:  No cyanosis or edema.   NEURO: Alert, oriented, grossly unremarkable  PSYCH: Cooperative, appropriate. CONSTITUTIONAL: Well-developed; well-nourished; in no acute distress.   SKIN: warm, dry  HEAD: Normocephalic; atraumatic.  EYES: no conjunctival injection.   ENT: No nasal discharge; airway clear.  NECK: Supple; non tender.  CARD: S1, S2 normal; no murmurs, gallops, or rubs. Regular rate and rhythm.   RESP: No wheezes, rales or rhonchi. Good air movement bilaterally.   ABD: mild TTP LLQ. + L CVA tenderness   EXT:  No cyanosis or edema.   NEURO: Alert, oriented, grossly unremarkable  PSYCH: Cooperative, appropriate.

## 2021-03-20 NOTE — ED PROVIDER NOTE - PROGRESS NOTE DETAILS
O'Tess DO PGY-1: Offered pelvic exam to patient and explain how it would be performed and what we'd be looking for. Explained that a female chaperone would be in the room. Pt refused pelvic exam. O'Tess DO PGY-1: pt also refused rectal exam for guiac. Pt AOx3. Knows what she's here. Has capacity. Breanna TUCKER PGY-1: reassessed patient, states she is feeling well and no acute complaints at this time. Breanna TUCKER PGY-1: from collateral from pt's family, the pt has been sleeping most of the day and has not been eating as much (one meal per day) which can explain the weight loss.

## 2021-03-20 NOTE — PATIENT PROFILE ADULT - CENTRAL VENOUS CATHETER/PICC LINE
Xeljodeez Pregnancy And Lactation Text: This medication is Pregnancy Category D and is not considered safe during pregnancy.  The risk during breast feeding is also uncertain. no

## 2021-03-20 NOTE — ED PROVIDER NOTE - CLINICAL SUMMARY MEDICAL DECISION MAKING FREE TEXT BOX
O'Tess DO PGY-1: pt bibems for vaginal bleeding. pt also endorses lower back pain along w/ fatigue and weight loss over the past few months. Here concern for acute blood loss. Along concern for malignancy. ordered cbc, cmp, covid swab, ua, uclx, CTAP w/ iv con, coags, type and screen. O'Tess DO PGY-1: pt bibems for vaginal bleeding. pt also endorses lower back pain along w/ fatigue and weight loss over the past few months. Concern for acute blood loss vs hemorrhoids vs dysfunctional uterine bleeding.  ordered cbc, cmp, covid swab, ua, uclx, coags, type and screen. O'Tess DO PGY-1: pt bibems for vaginal bleeding. pt also endorses lower back pain along w/ fatigue and weight loss over the past few months. Concern for acute blood loss vs hemorrhoids vs dysfunctional uterine bleeding.  ordered cbc, cmp, covid swab, ua, uclx, coags, type and screen.    Marsha: Patient with ? vaginal bleeding or rectal bleeding. O'Tess DO PGY-1: pt bibems for vaginal bleeding. pt also endorses lower back pain along w/ fatigue and weight loss over the past few months. Concern for acute blood loss vs hemorrhoids vs dysfunctional uterine bleeding.  ordered cbc, cmp, covid swab, ua, uclx, coags, type and screen.    Marsha: Patient with ? vaginal bleeding or rectal bleeding. family member saw blood in underwear. spoke with son who states patient sleeps 18-20 hours a day., not eating well at home. was dc'd after benzo withdrawl hospitalization. not drinking at home. very rarely taking valium. no cp, nos ob, no complaints otherwisre. barely gets up to walk to. refused GI and vaginal exam.

## 2021-03-20 NOTE — ED ADULT NURSE NOTE - OBJECTIVE STATEMENT
65F to ED c/o BRB vaginal bleeding this morning. Patient states she is post-menopausal, and that she is incontinent of urine and stool at baseline. Patient also states possible streaks of blood in stool this morning. Patient states recent unintentional weight loss of 30 pounds. Patient is alert and oriented x4, calm/cooperative, NAD, VSS. Patient states hx of COPD and valium withdrawal. 65F to ED c/o BRB vaginal bleeding this morning. Patient states she is post-menopausal, and that she is incontinent of urine and stool at baseline. Patient also states possible streaks of blood in stool this morning. Patient states recent unintentional weight loss of 30 pounds. Patient is alert and oriented x4, calm/cooperative, NAD, VSS. Patient states hx of COPD and valium withdrawal. Patient states that she is lightheaded but that this is not new. Patient has 18 gauge to RAC placed in ED.

## 2021-03-21 DIAGNOSIS — J44.9 CHRONIC OBSTRUCTIVE PULMONARY DISEASE, UNSPECIFIED: ICD-10-CM

## 2021-03-21 DIAGNOSIS — Z02.9 ENCOUNTER FOR ADMINISTRATIVE EXAMINATIONS, UNSPECIFIED: ICD-10-CM

## 2021-03-21 DIAGNOSIS — N93.9 ABNORMAL UTERINE AND VAGINAL BLEEDING, UNSPECIFIED: ICD-10-CM

## 2021-03-21 DIAGNOSIS — F41.9 ANXIETY DISORDER, UNSPECIFIED: ICD-10-CM

## 2021-03-21 DIAGNOSIS — Z29.9 ENCOUNTER FOR PROPHYLACTIC MEASURES, UNSPECIFIED: ICD-10-CM

## 2021-03-21 LAB
ANION GAP SERPL CALC-SCNC: 12 MMOL/L — SIGNIFICANT CHANGE UP (ref 5–17)
BUN SERPL-MCNC: 8 MG/DL — SIGNIFICANT CHANGE UP (ref 7–23)
CALCIUM SERPL-MCNC: 9.2 MG/DL — SIGNIFICANT CHANGE UP (ref 8.4–10.5)
CHLORIDE SERPL-SCNC: 102 MMOL/L — SIGNIFICANT CHANGE UP (ref 96–108)
CO2 SERPL-SCNC: 26 MMOL/L — SIGNIFICANT CHANGE UP (ref 22–31)
CREAT SERPL-MCNC: 0.85 MG/DL — SIGNIFICANT CHANGE UP (ref 0.5–1.3)
GLUCOSE SERPL-MCNC: 98 MG/DL — SIGNIFICANT CHANGE UP (ref 70–99)
HCT VFR BLD CALC: 33.7 % — LOW (ref 34.5–45)
HGB BLD-MCNC: 11.1 G/DL — LOW (ref 11.5–15.5)
MAGNESIUM SERPL-MCNC: 1.8 MG/DL — SIGNIFICANT CHANGE UP (ref 1.6–2.6)
MCHC RBC-ENTMCNC: 30.6 PG — SIGNIFICANT CHANGE UP (ref 27–34)
MCHC RBC-ENTMCNC: 32.9 GM/DL — SIGNIFICANT CHANGE UP (ref 32–36)
MCV RBC AUTO: 92.8 FL — SIGNIFICANT CHANGE UP (ref 80–100)
NRBC # BLD: 0 /100 WBCS — SIGNIFICANT CHANGE UP (ref 0–0)
PHOSPHATE SERPL-MCNC: 2.5 MG/DL — SIGNIFICANT CHANGE UP (ref 2.5–4.5)
PLATELET # BLD AUTO: 387 K/UL — SIGNIFICANT CHANGE UP (ref 150–400)
POTASSIUM SERPL-MCNC: 3.1 MMOL/L — LOW (ref 3.5–5.3)
POTASSIUM SERPL-SCNC: 3.1 MMOL/L — LOW (ref 3.5–5.3)
RBC # BLD: 3.63 M/UL — LOW (ref 3.8–5.2)
RBC # FLD: 12.5 % — SIGNIFICANT CHANGE UP (ref 10.3–14.5)
SODIUM SERPL-SCNC: 140 MMOL/L — SIGNIFICANT CHANGE UP (ref 135–145)
TSH SERPL-MCNC: 1.09 UIU/ML — SIGNIFICANT CHANGE UP (ref 0.27–4.2)
WBC # BLD: 6.43 K/UL — SIGNIFICANT CHANGE UP (ref 3.8–10.5)
WBC # FLD AUTO: 6.43 K/UL — SIGNIFICANT CHANGE UP (ref 3.8–10.5)

## 2021-03-21 PROCEDURE — 99233 SBSQ HOSP IP/OBS HIGH 50: CPT | Mod: GC

## 2021-03-21 RX ORDER — UMECLIDINIUM 62.5 UG/1
1 AEROSOL, POWDER ORAL
Qty: 0 | Refills: 0 | DISCHARGE

## 2021-03-21 RX ORDER — ESOMEPRAZOLE MAGNESIUM 40 MG/1
2 CAPSULE, DELAYED RELEASE ORAL
Qty: 0 | Refills: 0 | DISCHARGE

## 2021-03-21 RX ORDER — PREGABALIN 225 MG/1
0 CAPSULE ORAL
Qty: 0 | Refills: 0 | DISCHARGE

## 2021-03-21 RX ORDER — MULTIVIT-MIN/FERROUS GLUCONATE 9 MG/15 ML
1 LIQUID (ML) ORAL
Qty: 0 | Refills: 0 | DISCHARGE

## 2021-03-21 RX ORDER — POTASSIUM CHLORIDE 20 MEQ
40 PACKET (EA) ORAL ONCE
Refills: 0 | Status: COMPLETED | OUTPATIENT
Start: 2021-03-21 | End: 2021-03-21

## 2021-03-21 RX ORDER — IPRATROPIUM/ALBUTEROL SULFATE 18-103MCG
3 AEROSOL WITH ADAPTER (GRAM) INHALATION EVERY 6 HOURS
Refills: 0 | Status: DISCONTINUED | OUTPATIENT
Start: 2021-03-21 | End: 2021-03-25

## 2021-03-21 RX ORDER — CHOLECALCIFEROL (VITAMIN D3) 125 MCG
0 CAPSULE ORAL
Qty: 0 | Refills: 0 | DISCHARGE

## 2021-03-21 RX ADMIN — Medication 40 MILLIEQUIVALENT(S): at 07:42

## 2021-03-21 NOTE — H&P ADULT - NSICDXFAMILYHX_GEN_ALL_CORE_FT
FAMILY HISTORY:  No pertinent family history in first degree relatives     FAMILY HISTORY:  FH: brain tumor, maternal  FH: breast cancer, paternal sister

## 2021-03-21 NOTE — PROGRESS NOTE ADULT - SUBJECTIVE AND OBJECTIVE BOX
London Santos MD  Internal Medicine Resident  [phone]    PATIENT:  JUWAN OBANDO  [id number]    CHIEF COMPLAINT:  Patient is a 65y old  Female who presents with a chief complaint of post menopausal bleeding (21 Mar 2021 00:19)      INTERVAL HISTORY/OVERNIGHT EVENTS:      REVIEW OF SYSTEMS:    Constitutional:     [ ] negative [ ] fevers [ ] chills [ ] weight loss [ ] weight gain  HEENT:                  [ ] negative [ ] dry eyes [ ] eye irritation [ ] postnasal drip [ ] nasal congestion  CV:                         [ ] negative  [ ] chest pain [ ] orthopnea [ ] palpitations [ ] murmur  Resp:                     [ ] negative [ ] cough [ ] shortness of breath [ ] dyspnea [ ] wheezing [ ] sputum [ ] hemoptysis  GI:                          [ ] negative [ ] nausea [ ] vomiting [ ] diarrhea [ ] constipation [ ] abd pain [ ] dysphagia   :                        [ ] negative [ ] dysuria [ ] nocturia [ ] hematuria [ ] increased urinary frequency  Musculoskeletal: [ ] negative [ ] back pain [ ] myalgias [ ] arthralgias [ ] fracture  Skin:                       [ ] negative [ ] rash [ ] itch  Neurological:        [ ] negative [ ] headache [ ] dizziness [ ] syncope [ ] weakness [ ] numbness  Psychiatric:           [ ] negative [ ] anxiety [ ] depression  Endocrine:            [ ] negative [ ] diabetes [ ] thyroid problem  Heme/Lymph:      [ ] negative [ ] anemia [ ] bleeding problem  Allergic/Immune: [ ] negative [ ] itchy eyes [ ] nasal discharge [ ] hives [ ] angioedema    [ ] All other systems negative  [ ] Unable to assess ROS because ________.    MEDICATIONS:  MEDICATIONS  (STANDING):    MEDICATIONS  (PRN):  albuterol/ipratropium for Nebulization 3 milliLiter(s) Nebulizer every 6 hours PRN Shortness of Breath and/or Wheezing      ALLERGIES:  Allergies    Bananas (Vomiting; Diarrhea)  latex (Rash)  Levaquin (Other (Moderate))    Intolerances        OBJECTIVE:  ICU Vital Signs Last 24 Hrs  T(C): 36.7 (21 Mar 2021 04:41), Max: 37.4 (20 Mar 2021 14:49)  T(F): 98 (21 Mar 2021 04:41), Max: 99.3 (20 Mar 2021 14:49)  HR: 56 (21 Mar 2021 04:41) (56 - 74)  BP: 143/83 (21 Mar 2021 04:41) (115/77 - 150/91)  BP(mean): --  ABP: --  ABP(mean): --  RR: 18 (21 Mar 2021 04:41) (17 - 20)  SpO2: 99% (21 Mar 2021 04:41) (99% - 100%)      Adult Advanced Hemodynamics Last 24 Hrs  CVP(mm Hg): --  CVP(cm H2O): --  CO: --  CI: --  PA: --  PA(mean): --  PCWP: --  SVR: --  SVRI: --  PVR: --  PVRI: --  CAPILLARY BLOOD GLUCOSE        CAPILLARY BLOOD GLUCOSE        I&O's Summary    Daily Height in cm: 162.56 (20 Mar 2021 14:49)    Daily     PHYSICAL EXAMINATION:  General: WN/WD NAD  HEENT: PERRLA, EOMI, moist mucous membranes  Neurology: A&Ox3, nonfocal, RAMOS x 4  Respiratory: CTA B/L, normal respiratory effort, no wheezes, crackles, rales  CV: RRR, S1S2, no murmurs, rubs or gallops  Abdominal: Soft, NT, ND +BS, Last BM  Extremities: No edema, + peripheral pulses  Incisions:   Tubes:    LABS:                          11.1   6.43  )-----------( 387      ( 21 Mar 2021 06:37 )             33.7     03-21    140  |  102  |  8   ----------------------------<  98  3.1<L>   |  26  |  0.85    Ca    9.2      21 Mar 2021 06:37  Phos  2.5     03-21  Mg     1.8     03-21    TPro  6.0  /  Alb  3.0<L>  /  TBili  0.4  /  DBili  x   /  AST  17  /  ALT  14  /  AlkPhos  51  03-20    LIVER FUNCTIONS - ( 20 Mar 2021 15:52 )  Alb: 3.0 g/dL / Pro: 6.0 g/dL / ALK PHOS: 51 U/L / ALT: 14 U/L / AST: 17 U/L / GGT: x           PT/INR - ( 20 Mar 2021 15:52 )   PT: 12.9 sec;   INR: 1.08 ratio         PTT - ( 20 Mar 2021 15:52 )  PTT:27.4 sec        Urinalysis Basic - ( 20 Mar 2021 21:40 )    Color: Light Orange / Appearance: Slightly Turbid / S.017 / pH: x  Gluc: x / Ketone: Negative  / Bili: Negative / Urobili: Negative   Blood: x / Protein: 30 mg/dL / Nitrite: Negative   Leuk Esterase: Negative / RBC: 1633 /hpf / WBC 8 /HPF   Sq Epi: x / Non Sq Epi: 0 /hpf / Bacteria: Negative        TELEMETRY:     EKG:     IMAGING:       London Santos MD  Internal Medicine Resident  [phone]    PATIENT:  JUWAN OBANDO  [id number]    CHIEF COMPLAINT:  Patient is a 65y old  Female who presents with a chief complaint of post menopausal bleeding (21 Mar 2021 00:19)      INTERVAL HISTORY/OVERNIGHT EVENTS:  - overnight admitted for possible  bleeding  - patient refused CT scan this morning because of concerns over the dye - explained risk/benefit, and patient consents to CT  - overall frail, has been losing weight  - ambulatory with cane    MEDICATIONS:  MEDICATIONS  (STANDING):    MEDICATIONS  (PRN):  albuterol/ipratropium for Nebulization 3 milliLiter(s) Nebulizer every 6 hours PRN Shortness of Breath and/or Wheezing      ALLERGIES:  Allergies    Bananas (Vomiting; Diarrhea)  latex (Rash)  Levaquin (Other (Moderate))    Intolerances    OBJECTIVE:  T(C): 36.7 (21 Mar 2021 04:41), Max: 37.4 (20 Mar 2021 14:49)  T(F): 98 (21 Mar 2021 04:41), Max: 99.3 (20 Mar 2021 14:49)  HR: 56 (21 Mar 2021 04:41) (56 - 74)  BP: 143/83 (21 Mar 2021 04:41) (115/77 - 150/91)  RR: 18 (21 Mar 2021 04:41) (17 - 20)  SpO2: 99% (21 Mar 2021 04:41) (99% - 100%)    I&O's Summary    Daily Height in cm: 162.56 (20 Mar 2021 14:49)    Daily     PHYSICAL EXAMINATION:  General: WN/WD NAD, frail appearing, cane at bedside  HEENT: PERRL, EOMI, moist mucous membranes  Neurology: A&Ox3, nonfocal, RAMOS x 4  Respiratory: CTA B/L, normal respiratory effort, no wheezes, crackles, rales  CV: RRR, S1S2, no murmurs, rubs or gallops  Abdominal: Soft, NT, ND +BS  : no blood on pelvic digital exam, external genitalia wnl, no perineal anesthesia  Extremities: No edema, +peripheral pulses    LABS:                          11.1   6.43  )-----------( 387      ( 21 Mar 2021 06:37 )             33.7     03-21    140  |  102  |  8   ----------------------------<  98  3.1<L>   |  26  |  0.85    Ca    9.2      21 Mar 2021 06:37  Phos  2.5     -  Mg     1.8     -    TPro  6.0  /  Alb  3.0<L>  /  TBili  0.4  /  DBili  x   /  AST  17  /  ALT  14  /  AlkPhos  51  03-20    LIVER FUNCTIONS - ( 20 Mar 2021 15:52 )  Alb: 3.0 g/dL / Pro: 6.0 g/dL / ALK PHOS: 51 U/L / ALT: 14 U/L / AST: 17 U/L / GGT: x           PT/INR - ( 20 Mar 2021 15:52 )   PT: 12.9 sec;   INR: 1.08 ratio    PTT - ( 20 Mar 2021 15:52 )  PTT:27.4 sec    Urinalysis Basic - ( 20 Mar 2021 21:40 )    Color: Light Orange / Appearance: Slightly Turbid / S.017 / pH: x  Gluc: x / Ketone: Negative  / Bili: Negative / Urobili: Negative   Blood: x / Protein: 30 mg/dL / Nitrite: Negative   Leuk Esterase: Negative / RBC: 1633 /hpf / WBC 8 /HPF   Sq Epi: x / Non Sq Epi: 0 /hpf / Bacteria: Negative

## 2021-03-21 NOTE — H&P ADULT - NSHPPHYSICALEXAM_GEN_ALL_CORE
PHYSICAL EXAM:  Vital Signs Last 24 Hrs  T(C): 36.7 (21 Mar 2021 00:11), Max: 37.4 (20 Mar 2021 14:49)  T(F): 98.1 (21 Mar 2021 00:11), Max: 99.3 (20 Mar 2021 14:49)  HR: 58 (21 Mar 2021 00:11) (58 - 74)  BP: 131/78 (21 Mar 2021 00:11) (115/77 - 150/91)  BP(mean): --  RR: 18 (21 Mar 2021 00:11) (17 - 20)  SpO2: 99% (21 Mar 2021 00:11) (99% - 100%)    PHYSICAL EXAM:  GENERAL: NAD, well-groomed, well-developed  HEAD:  Atraumatic, Normocephalic  EYES: EOMI, PERRLA, conjunctiva and sclera clear  ENMT: No tonsillar erythema, exudates, or enlargement; Moist mucous membranes, Good dentition, No lesions  NECK: Supple, No JVD, Normal thyroid  CHEST/LUNG: Clear to ausculation bilaterally; No rales, rhonchi, wheezing, or rubs  HEART: Regular rate and rhythm; No murmurs, rubs, or gallops  ABDOMEN: Soft, Nontender, Nondistended; Bowel sounds present  EXTREMITIES:  2+ Peripheral Pulses, No clubbing, cyanosis, or edema  LYMPH: No lymphadenopathy noted  SKIN: No rashes or lesions  NERVOUS SYSTEM:  Alert & Oriented X3, Good concentration; Motor Strength 5/5 B/L upper and lower extremities; DTRs 2+ intact and symmetric PHYSICAL EXAM:  Vital Signs Last 24 Hrs  T(C): 36.7 (21 Mar 2021 00:11), Max: 37.4 (20 Mar 2021 14:49)  T(F): 98.1 (21 Mar 2021 00:11), Max: 99.3 (20 Mar 2021 14:49)  HR: 58 (21 Mar 2021 00:11) (58 - 74)  BP: 131/78 (21 Mar 2021 00:11) (115/77 - 150/91)  BP(mean): --  RR: 18 (21 Mar 2021 00:11) (17 - 20)  SpO2: 99% (21 Mar 2021 00:11) (99% - 100%)    PHYSICAL EXAM:  GENERAL: NAD, AAO x 3  HEAD:  Atraumatic, Normocephalic  EYES: EOMI, PERRLA, conjunctiva and sclera clear  ENMT: No tonsillar erythema, exudates, or enlargement; Moist mucous membranes, Good dentition, No lesions  CHEST/LUNG: Clear to ausculation bilaterally; No rales, rhonchi, wheezing, or rubs  HEART: Regular rate and rhythm; No murmurs, rubs, or gallops  ABDOMEN: Soft, Nontender, Nondistended; Bowel sounds present  EXTREMITIES:  2+ Peripheral Pulses, No clubbing, cyanosis, or edema  LYMPH: No lymphadenopathy noted  GYN exam: no vaginal bleeding noted on exam   SKIN: No rashes or lesions  NERVOUS SYSTEM:  Alert & Oriented X3, Good concentration; no focal deficits

## 2021-03-21 NOTE — PROVIDER CONTACT NOTE (OTHER) - ASSESSMENT
Pt is currently back in bed - showing no signs of acute distress or pain. Pt denies any pain. pt respirations are 18

## 2021-03-21 NOTE — PHYSICAL THERAPY INITIAL EVALUATION ADULT - PERTINENT HX OF CURRENT PROBLEM, REHAB EVAL
Pt is a 65y F with PMH: of COPD (not on home O2 or BIPAP), gastric and duodenal ulcers (H. pylori negative), lumbar spinal stenosis s/p laminectomy, anxiety, alcohol and benzodiazepine abuse. Recent admission 02/10 - 02/16 for benzodiazepine withdrawal. Presnts to ED for post-menopausal bleeding. Pt has fecal and urinary incontinence at baseline. She had a second episode in the ED but has not had any further bleeding episodes. Last pap smear 2015 was normal.

## 2021-03-21 NOTE — H&P ADULT - HISTORY OF PRESENT ILLNESS
66 yo F w/ PMHx of COPD, gastric and duodenal ulcers (H. pylori negative), lumbar spinal stenosis s/p laminectomy, anxiety, alcohol and benzodiazepine abuse w/ recent admission 02/10 - 02/16 for benzodiazepine withdrawal who is presenting for vaginal bleeding.        and was recently here for benzo withdrawal presents today c/o vaginal bleeding. Pt is fecal and urinary incontinent at baseline. States that when she was urinating she noted vaginal bleeding. Denies any recent vaginal discharge prior to today or pelvic trauma. Is not sexually active. Does endorse 30 lbs unintentional weight loss over the past few months along w/ fatigue during that same duration. Denies fevers, chills, nausea, vomiting, chest pain, sob, diarrhea, dysuria. Denies hx of hemorrhoids. SurgHx of gallbladder removal. Former smoker of about 25 years. 66 yo F w/ PMHx of COPD (not on home O2 or BIPAP), gastric and duodenal ulcers (H. pylori negative), lumbar spinal stenosis s/p laminectomy, anxiety, alcohol and benzodiazepine abuse w/ recent admission 02/10 - 02/16 for benzodiazepine withdrawal who is presenting for vaginal bleeding. Pt has fecal and urinary incontinence at baseline and wears pads and diapers.  She reports that Saturday morning she noticed BRB in her pad; denies hematochezia or melena and confirmed that bleeding was from vagina.  She had a second episode in the ED but has not had any further bleeding episodes.  Pt reports starting menopause around age 55 and that this is her first time having a period since that time.  Pt has a PCP that she does not regularly follow up and last pap smear in 2015 which was normal.  Pt endorses unintentional weight loss of 30 lbs over 2 months as well as decreased appetite as well as increased fatigue.  Has not noticed any breast masses or lymphadenopathy and denies fevers.  Currently denies feeling light headed or dizzy, no palpitations.         66 yo F w/ PMHx of COPD (not on home O2 or BIPAP), gastric and duodenal ulcers (H. pylori negative), lumbar spinal stenosis s/p laminectomy, anxiety, alcohol and benzodiazepine abuse w/ recent admission 02/10 - 02/16 for benzodiazepine withdrawal who is presenting for vaginal bleeding. Pt has fecal and urinary incontinence at baseline and wears pads and diapers.  She reports that Saturday morning she noticed BRB in her pad; denies hematochezia or melena and confirmed that bleeding was from vagina.  She had a second episode in the ED but has not had any further bleeding episodes.  Pt reports starting menopause around age 55 and that this is her first time having a period since that time.  Pt has a PCP that she does not regularly follow up and last pap smear in 2015 which was normal.  Pt endorses unintentional weight loss of 30 lbs over 2 months as well as decreased appetite as well as increased fatigue.  Has not noticed any breast masses or lymphadenopathy and denies fevers.  Currently denies feeling lightheaded or dizzy, no palpitations.

## 2021-03-21 NOTE — PROVIDER CONTACT NOTE (OTHER) - ACTION/TREATMENT ORDERED:
As per provider, continue to monitor pt, continue with safety precautions, continue with bleeding precautions, continue with current orders

## 2021-03-21 NOTE — H&P ADULT - ATTENDING COMMENTS
Pt here with gyn bleeding concerning for malignancy in setting of significant weight loss. Will need GYN evaluation and imaging for malignancy workup. Case d/w resident physician.

## 2021-03-21 NOTE — PROGRESS NOTE ADULT - ATTENDING COMMENTS
Agree with assessment and plan as above by Dr. Santos. Reviewed all pertinent labs, glucose values, and imaging studies. Modifications made as indicated above.     Roscoe Garay D.O  [phone] Agree with assessment and plan as above by Dr. Santos. Reviewed all pertinent labs, glucose values, and imaging studies. Modifications made as indicated above. Unclear source of bleeding. Follow-up TVUS and CT A/P also FOBT given anemia and unintentional weight loss.     Roscoe Garay D.O  [phone]

## 2021-03-21 NOTE — H&P ADULT - ASSESSMENT
64 yo F w/ PMHx of COPD (not on home O2 or BIPAP), gastric and duodenal ulcers (H. pylori negative), lumbar spinal stenosis s/p laminectomy, anxiety, alcohol and benzodiazepine abuse w/ recent admission 02/10 - 02/16 for benzodiazepine withdrawal who is presenting for post-menopausal bleeding.

## 2021-03-21 NOTE — PHYSICAL THERAPY INITIAL EVALUATION ADULT - ADDITIONAL COMMENTS
Pt lives with  and son in apt with 4 TOY and 5+5 to get to 2nd floor apt level. PTA, pt independent in functional mobility with occasional assist in shower 2/2 decreased stability (given by ), pt ambulates with SAC for community distances and no AD for household distances. Pt son and/or  are home to assist prn (i.e stair negotiation). Pt reports unsteadiness in gait since last admission in Feb; pt with history of falls.

## 2021-03-21 NOTE — CONSULT NOTE ADULT - ASSESSMENT
65 yoF P2 LMP>10yrs w/ PMHx of COPD (not on home O2 or BIPAP), gastric and duodenal ulcers (H. pylori negative), lumbar spinal stenosis s/p laminectomy, anxiety, alcohol and benzodiazepine abuse w/ recent admission 02/10 - 02/16 for benzodiazepine withdrawal who is admitted for vaginal bleeding.   - Low suspicion for  bleed at this time.  - TVUS + CTAP to evaluate GYN and GI possible cause  - Rec FOBT      seen with and examined by Dr.Lee Chandan Gruber PGY2  65 yoF P2 LMP>10yrs w/ PMHx of COPD (not on home O2 or BIPAP), gastric and duodenal ulcers (H. pylori negative), lumbar spinal stenosis s/p laminectomy, anxiety, alcohol and benzodiazepine abuse w/ recent admission 02/10 - 02/16 for benzodiazepine withdrawal who is admitted for vaginal bleeding.   - Low suspicion for GYN source of bleed at this time based on exam  - TVUS + CTAP to evaluate GYN and GI possible cause  - Rec FOBT      seen with and examined by Dr.Lee Chandan Gruber PGY2

## 2021-03-21 NOTE — H&P ADULT - NSHPREVIEWOFSYSTEMS_GEN_ALL_CORE
CONSTITUTIONAL: No weakness, fevers or chills  EYES: No visual changes; No blurry vision  ENT:  No pain or stiffness; No vertigo or throat pain  RESPIRATORY: No cough, wheezing, hemoptysis; No shortness of breath  CARDIOVASCULAR: No chest pain or palpitations  GASTROINTESTINAL: No abdominal or epigastric pain. No nausea, vomiting, or hematemesis; No diarrhea or constipation. No melena or hematochezia.  GENITOURINARY: No dysuria, frequency or hematuria  NEUROLOGICAL: No numbness, No HA  SKIN: No itching, rashes  MSK: no joint pain, no muscle pain CONSTITUTIONAL: No fevers or chills  EYES: No visual changes; No blurry vision  ENT:  No pain or stiffness; No vertigo or throat pain  RESPIRATORY: No cough, wheezing, hemoptysis; No shortness of breath  CARDIOVASCULAR: No chest pain or palpitations  GASTROINTESTINAL: No abdominal or epigastric pain. No nausea, vomiting, or hematemesis; No diarrhea or constipation. No melena or hematochezia.  GENITOURINARY: No dysuria, frequency  NEUROLOGICAL: No numbness, No HA  SKIN: No itching, rashes  MSK: no joint pain, no muscle pain

## 2021-03-21 NOTE — PROGRESS NOTE ADULT - PROBLEM SELECTOR PLAN 1
- GYN consult in AM   - maintain active type and screen, transfuse if Hbg < 7 or if hemodynamically stable in the setting of bleeding  - f/u CT abd/pelvis w/ IV contrast to r/o mass  - f/u TSH for weight loss, fatigue - GYN consult  - maintain active type and screen, transfuse if Hbg < 7 or if hemodynamically stable in the setting of bleeding  - f/u CT abd/pelvis w/ IV contrast to r/o mass  - transvaginal ultrasound  - TSH wnl

## 2021-03-21 NOTE — H&P ADULT - PROBLEM SELECTOR PLAN 3
- per pt she is not on medications at home  - on last admission pt was on Remeron 7.5 mg QHS and Valium 2mg TID for insomnia w/ PRN ativan 1mg q6 for severe anxiety  - will hold off for now as pt not endorsing insomnia or anxiety

## 2021-03-21 NOTE — CONSULT NOTE ADULT - SUBJECTIVE AND OBJECTIVE BOX
JUWAN OBANDO  65y  Female [id number]    HPI:  65 yoF P2 LMP>10yrs w/ PMHx of COPD (not on home O2 or BIPAP), gastric and duodenal ulcers (H. pylori negative), lumbar spinal stenosis s/p laminectomy, anxiety, alcohol and benzodiazepine abuse w/ recent admission 02/10 -  for benzodiazepine withdrawal who is admitted for vaginal bleeding. Patient was "squatting" for a bowel movement yesterday and noticed BRB in toilet, describing it as a "gush". The same had occurred yesteray in the ED. At this time patient has no bleeding. Also has 30# weight loss over past 2 months. Denies fevers, chills, CP, SOB, abdominal pain  Pt last saw ObGyn after delivery of her twins 30 years ago.    Pgyn: Denies fibroids, cysts, endometriosis, STI's, Abnormal pap smears     Home meds:     Hospital Meds:   MEDICATIONS  (STANDING):    MEDICATIONS  (PRN):  albuterol/ipratropium for Nebulization 3 milliLiter(s) Nebulizer every 6 hours PRN Shortness of Breath and/or Wheezing      Allergies    Bananas (Vomiting; Diarrhea)  latex (Rash)  Levaquin (Other (Moderate))    Intolerances        PAST MEDICAL & SURGICAL HISTORY:  Chronic midline back pain, unspecified back location    Anxiety    GERD (gastroesophageal reflux disease)    COPD (chronic obstructive pulmonary disease)    H/O laminectomy    FH: cholecystectomy    Vital Signs Last 24 Hrs  T(C): 36.8 (21 Mar 2021 13:01), Max: 37.4 (20 Mar 2021 14:49)  T(F): 98.3 (21 Mar 2021 13:01), Max: 99.3 (20 Mar 2021 14:49)  HR: 65 (21 Mar 2021 13:01) (56 - 74)  BP: 129/74 (21 Mar 2021 13:01) (115/77 - 150/91)  BP(mean): --  RR: 18 (21 Mar 2021 13:01) (17 - 20)  SpO2: 98% (21 Mar 2021 13:01) (98% - 100%)    Physical Exam:   General: sitting comfortably in bed, NAD   HEENT: neck supple, full ROM  CV: RR S1S2 no m/r/g  Lungs: CTA b/l, good air flow b/l   Back: No CVA tenderness  Abd: Soft, non-tender, non-distended.  Bowel sounds present.    :  No bleeding on pad.    External labia wnl.  Bimanual exam with no cervical motion tenderness, uterus wnl, adnexa non palpable b/l.  Cervix closed, no masses palpated  Speculum Exam: No active bleeding from os.   Rectal: no blood  Ext: non-tender b/l, no edema     LABS:                        11.1   6.43  )-----------( 387      ( 21 Mar 2021 06:37 )             33.7     03-21    140  |  102  |  8   ----------------------------<  98  3.1<L>   |  26  |  0.85    Ca    9.2      21 Mar 2021 06:37  Phos  2.5     -  Mg     1.8         TPro  6.0  /  Alb  3.0<L>  /  TBili  0.4  /  DBili  x   /  AST  17  /  ALT  14  /  AlkPhos  51  20    I&O's Detail    21 Mar 2021 07:01  -  21 Mar 2021 13:40  --------------------------------------------------------  IN:    Oral Fluid: 480 mL  Total IN: 480 mL    OUT:  Total OUT: 0 mL    Total NET: 480 mL      PT/INR - ( 20 Mar 2021 15:52 )   PT: 12.9 sec;   INR: 1.08 ratio    PTT - ( 20 Mar 2021 15:52 )  PTT:27.4 sec  Urinalysis Basic - ( 20 Mar 2021 21:40 )    Color: Light Orange / Appearance: Slightly Turbid / S.017 / pH: x  Gluc: x / Ketone: Negative  / Bili: Negative / Urobili: Negative   Blood: x / Protein: 30 mg/dL / Nitrite: Negative   Leuk Esterase: Negative / RBC: 1633 /hpf / WBC 8 /HPF   Sq Epi: x / Non Sq Epi: 0 /hpf / Bacteria: Negative JUWAN OBANDO  65y  Female [id number]    HPI:  65 yoF P2 LMP>10yrs w/ PMHx of COPD (not on home O2 or BIPAP), gastric and duodenal ulcers (H. pylori negative), lumbar spinal stenosis s/p laminectomy, anxiety, alcohol and benzodiazepine abuse w/ recent admission 02/10 -  for benzodiazepine withdrawal who is admitted for vaginal bleeding. Patient was "squatting" for a bowel movement yesterday and noticed BRB in toilet, describing it as a "gush". The same had occurred yesteray in the ED. At this time patient has no bleeding. Also has 30# weight loss over past 2 months. Denies fevers, chills, CP, SOB, abdominal pain  Pt last saw ObGyn after delivery of her twins 30 years ago.    Pgyn: Denies fibroids, cysts, endometriosis, STI's, Abnormal pap smears     Home meds:     Hospital Meds:   MEDICATIONS  (STANDING):    MEDICATIONS  (PRN):  albuterol/ipratropium for Nebulization 3 milliLiter(s) Nebulizer every 6 hours PRN Shortness of Breath and/or Wheezing      Allergies    Bananas (Vomiting; Diarrhea)  latex (Rash)  Levaquin (Other (Moderate))    Intolerances        PAST MEDICAL & SURGICAL HISTORY:  Chronic midline back pain, unspecified back location    Anxiety    GERD (gastroesophageal reflux disease)    COPD (chronic obstructive pulmonary disease)    H/O laminectomy    FH: cholecystectomy    Vital Signs Last 24 Hrs  T(C): 36.8 (21 Mar 2021 13:01), Max: 37.4 (20 Mar 2021 14:49)  T(F): 98.3 (21 Mar 2021 13:01), Max: 99.3 (20 Mar 2021 14:49)  HR: 65 (21 Mar 2021 13:01) (56 - 74)  BP: 129/74 (21 Mar 2021 13:01) (115/77 - 150/91)  BP(mean): --  RR: 18 (21 Mar 2021 13:01) (17 - 20)  SpO2: 98% (21 Mar 2021 13:01) (98% - 100%)    Physical Exam:   General: sitting comfortably in bed, NAD   HEENT: neck supple, full ROM  CV: RR S1S2 no m/r/g  Lungs: CTA b/l, good air flow b/l   Back: No CVA tenderness  Abd: Soft, non-tender, non-distended.  Bowel sounds present.    :  No bleeding on pad.    External labia wnl.  Bimanual exam with no cervical motion tenderness, uterus wnl, adnexa non palpable b/l.  Cervix closed, no masses palpated  Speculum Exam: No active bleeding from os.   Rectal: no gross blood noted, no masses, + external hemorrhoids  Ext: non-tender b/l, no edema     LABS:                        11.1   6.43  )-----------( 387      ( 21 Mar 2021 06:37 )             33.7     03-21    140  |  102  |  8   ----------------------------<  98  3.1<L>   |  26  |  0.85    Ca    9.2      21 Mar 2021 06:37  Phos  2.5     -  Mg     1.8     -    TPro  6.0  /  Alb  3.0<L>  /  TBili  0.4  /  DBili  x   /  AST  17  /  ALT  14  /  AlkPhos  51  20    I&O's Detail    21 Mar 2021 07:01  -  21 Mar 2021 13:40  --------------------------------------------------------  IN:    Oral Fluid: 480 mL  Total IN: 480 mL    OUT:  Total OUT: 0 mL    Total NET: 480 mL      PT/INR - ( 20 Mar 2021 15:52 )   PT: 12.9 sec;   INR: 1.08 ratio    PTT - ( 20 Mar 2021 15:52 )  PTT:27.4 sec  Urinalysis Basic - ( 20 Mar 2021 21:40 )    Color: Light Orange / Appearance: Slightly Turbid / S.017 / pH: x  Gluc: x / Ketone: Negative  / Bili: Negative / Urobili: Negative   Blood: x / Protein: 30 mg/dL / Nitrite: Negative   Leuk Esterase: Negative / RBC: 1633 /hpf / WBC 8 /HPF   Sq Epi: x / Non Sq Epi: 0 /hpf / Bacteria: Negative

## 2021-03-21 NOTE — CONSULT NOTE ADULT - ATTENDING COMMENTS
Pt seen and examined.  Agree with resident note.  No visual source of gyn / vaginal / cervical bleeding noted on exam.   adv eval with additional studies:  pelvic sono.

## 2021-03-21 NOTE — PROVIDER CONTACT NOTE (OTHER) - RECOMMENDATIONS
Continue to monitor pt, continue with safety precautions, continue with bleeding precautions, continue with providers orders

## 2021-03-21 NOTE — H&P ADULT - PROBLEM SELECTOR PLAN 1
- GYN consult in AM   - maintain active type and screen, transfuse if Hbg < 7 or if hemodynamically stable in the setting of bleeding - GYN consult in AM   - maintain active type and screen, transfuse if Hbg < 7 or if hemodynamically stable in the setting of bleeding  - f/u CT abd/pelvis w/ IV contrast to r/o mass  - f/u TSH for weight loss, fatigue

## 2021-03-21 NOTE — PROVIDER CONTACT NOTE (OTHER) - SITUATION
65 year old female pt who ambulated tot he bathroom and experienced a good amount of vaginal bleeding on the toilet

## 2021-03-22 DIAGNOSIS — R41.82 ALTERED MENTAL STATUS, UNSPECIFIED: ICD-10-CM

## 2021-03-22 DIAGNOSIS — D64.9 ANEMIA, UNSPECIFIED: ICD-10-CM

## 2021-03-22 LAB
ALBUMIN SERPL ELPH-MCNC: 3.3 G/DL — SIGNIFICANT CHANGE UP (ref 3.3–5)
ALP SERPL-CCNC: 52 U/L — SIGNIFICANT CHANGE UP (ref 40–120)
ALT FLD-CCNC: 18 U/L — SIGNIFICANT CHANGE UP (ref 10–45)
AMPHET UR-MCNC: NEGATIVE — SIGNIFICANT CHANGE UP
ANION GAP SERPL CALC-SCNC: 10 MMOL/L — SIGNIFICANT CHANGE UP (ref 5–17)
ANION GAP SERPL CALC-SCNC: 10 MMOL/L — SIGNIFICANT CHANGE UP (ref 5–17)
APTT BLD: 28.8 SEC — SIGNIFICANT CHANGE UP (ref 27.5–35.5)
AST SERPL-CCNC: 22 U/L — SIGNIFICANT CHANGE UP (ref 10–40)
BARBITURATES UR SCN-MCNC: NEGATIVE — SIGNIFICANT CHANGE UP
BASOPHILS # BLD AUTO: 0.05 K/UL — SIGNIFICANT CHANGE UP (ref 0–0.2)
BASOPHILS NFR BLD AUTO: 0.8 % — SIGNIFICANT CHANGE UP (ref 0–2)
BENZODIAZ UR-MCNC: NEGATIVE — SIGNIFICANT CHANGE UP
BILIRUB SERPL-MCNC: 0.3 MG/DL — SIGNIFICANT CHANGE UP (ref 0.2–1.2)
BUN SERPL-MCNC: 11 MG/DL — SIGNIFICANT CHANGE UP (ref 7–23)
BUN SERPL-MCNC: 12 MG/DL — SIGNIFICANT CHANGE UP (ref 7–23)
CALCIUM SERPL-MCNC: 9.1 MG/DL — SIGNIFICANT CHANGE UP (ref 8.4–10.5)
CALCIUM SERPL-MCNC: 9.2 MG/DL — SIGNIFICANT CHANGE UP (ref 8.4–10.5)
CHLORIDE SERPL-SCNC: 101 MMOL/L — SIGNIFICANT CHANGE UP (ref 96–108)
CHLORIDE SERPL-SCNC: 104 MMOL/L — SIGNIFICANT CHANGE UP (ref 96–108)
CO2 SERPL-SCNC: 26 MMOL/L — SIGNIFICANT CHANGE UP (ref 22–31)
CO2 SERPL-SCNC: 27 MMOL/L — SIGNIFICANT CHANGE UP (ref 22–31)
COCAINE METAB.OTHER UR-MCNC: NEGATIVE — SIGNIFICANT CHANGE UP
CREAT SERPL-MCNC: 0.78 MG/DL — SIGNIFICANT CHANGE UP (ref 0.5–1.3)
CREAT SERPL-MCNC: 0.88 MG/DL — SIGNIFICANT CHANGE UP (ref 0.5–1.3)
CULTURE RESULTS: SIGNIFICANT CHANGE UP
EOSINOPHIL # BLD AUTO: 0.05 K/UL — SIGNIFICANT CHANGE UP (ref 0–0.5)
EOSINOPHIL NFR BLD AUTO: 0.8 % — SIGNIFICANT CHANGE UP (ref 0–6)
GLUCOSE SERPL-MCNC: 88 MG/DL — SIGNIFICANT CHANGE UP (ref 70–99)
GLUCOSE SERPL-MCNC: 88 MG/DL — SIGNIFICANT CHANGE UP (ref 70–99)
HCT VFR BLD CALC: 32.9 % — LOW (ref 34.5–45)
HGB BLD-MCNC: 10.4 G/DL — LOW (ref 11.5–15.5)
IMM GRANULOCYTES NFR BLD AUTO: 0.7 % — SIGNIFICANT CHANGE UP (ref 0–1.5)
INR BLD: 1.09 RATIO — SIGNIFICANT CHANGE UP (ref 0.88–1.16)
LYMPHOCYTES # BLD AUTO: 2.48 K/UL — SIGNIFICANT CHANGE UP (ref 1–3.3)
LYMPHOCYTES # BLD AUTO: 40.7 % — SIGNIFICANT CHANGE UP (ref 13–44)
MAGNESIUM SERPL-MCNC: 2 MG/DL — SIGNIFICANT CHANGE UP (ref 1.6–2.6)
MCHC RBC-ENTMCNC: 29.7 PG — SIGNIFICANT CHANGE UP (ref 27–34)
MCHC RBC-ENTMCNC: 31.6 GM/DL — LOW (ref 32–36)
MCV RBC AUTO: 94 FL — SIGNIFICANT CHANGE UP (ref 80–100)
METHADONE UR-MCNC: NEGATIVE — SIGNIFICANT CHANGE UP
MONOCYTES # BLD AUTO: 0.46 K/UL — SIGNIFICANT CHANGE UP (ref 0–0.9)
MONOCYTES NFR BLD AUTO: 7.6 % — SIGNIFICANT CHANGE UP (ref 2–14)
NEUTROPHILS # BLD AUTO: 3.01 K/UL — SIGNIFICANT CHANGE UP (ref 1.8–7.4)
NEUTROPHILS NFR BLD AUTO: 49.4 % — SIGNIFICANT CHANGE UP (ref 43–77)
NRBC # BLD: 0 /100 WBCS — SIGNIFICANT CHANGE UP (ref 0–0)
OB PNL STL: NEGATIVE — SIGNIFICANT CHANGE UP
OPIATES UR-MCNC: NEGATIVE — SIGNIFICANT CHANGE UP
OXYCODONE UR-MCNC: NEGATIVE — SIGNIFICANT CHANGE UP
PCP SPEC-MCNC: SIGNIFICANT CHANGE UP
PCP UR-MCNC: NEGATIVE — SIGNIFICANT CHANGE UP
PHOSPHATE SERPL-MCNC: 2.9 MG/DL — SIGNIFICANT CHANGE UP (ref 2.5–4.5)
PLATELET # BLD AUTO: 374 K/UL — SIGNIFICANT CHANGE UP (ref 150–400)
POTASSIUM SERPL-MCNC: 2.8 MMOL/L — CRITICAL LOW (ref 3.5–5.3)
POTASSIUM SERPL-MCNC: 3.6 MMOL/L — SIGNIFICANT CHANGE UP (ref 3.5–5.3)
POTASSIUM SERPL-SCNC: 2.8 MMOL/L — CRITICAL LOW (ref 3.5–5.3)
POTASSIUM SERPL-SCNC: 3.6 MMOL/L — SIGNIFICANT CHANGE UP (ref 3.5–5.3)
PROT SERPL-MCNC: 6.2 G/DL — SIGNIFICANT CHANGE UP (ref 6–8.3)
PROTHROM AB SERPL-ACNC: 12.9 SEC — SIGNIFICANT CHANGE UP (ref 10.6–13.6)
RBC # BLD: 3.5 M/UL — LOW (ref 3.8–5.2)
RBC # FLD: 12.7 % — SIGNIFICANT CHANGE UP (ref 10.3–14.5)
SODIUM SERPL-SCNC: 137 MMOL/L — SIGNIFICANT CHANGE UP (ref 135–145)
SODIUM SERPL-SCNC: 141 MMOL/L — SIGNIFICANT CHANGE UP (ref 135–145)
SPECIMEN SOURCE: SIGNIFICANT CHANGE UP
THC UR QL: NEGATIVE — SIGNIFICANT CHANGE UP
WBC # BLD: 6.09 K/UL — SIGNIFICANT CHANGE UP (ref 3.8–10.5)
WBC # FLD AUTO: 6.09 K/UL — SIGNIFICANT CHANGE UP (ref 3.8–10.5)

## 2021-03-22 PROCEDURE — 99233 SBSQ HOSP IP/OBS HIGH 50: CPT | Mod: GC

## 2021-03-22 PROCEDURE — 99223 1ST HOSP IP/OBS HIGH 75: CPT

## 2021-03-22 PROCEDURE — 99232 SBSQ HOSP IP/OBS MODERATE 35: CPT

## 2021-03-22 RX ORDER — POTASSIUM CHLORIDE 20 MEQ
40 PACKET (EA) ORAL EVERY 4 HOURS
Refills: 0 | Status: COMPLETED | OUTPATIENT
Start: 2021-03-22 | End: 2021-03-22

## 2021-03-22 RX ORDER — DIAZEPAM 5 MG
5 TABLET ORAL
Refills: 0 | Status: DISCONTINUED | OUTPATIENT
Start: 2021-03-22 | End: 2021-03-25

## 2021-03-22 RX ORDER — POTASSIUM CHLORIDE 20 MEQ
10 PACKET (EA) ORAL
Refills: 0 | Status: COMPLETED | OUTPATIENT
Start: 2021-03-22 | End: 2021-03-22

## 2021-03-22 RX ORDER — OLANZAPINE 15 MG/1
5 TABLET, FILM COATED ORAL AT BEDTIME
Refills: 0 | Status: DISCONTINUED | OUTPATIENT
Start: 2021-03-22 | End: 2021-03-25

## 2021-03-22 RX ORDER — PREGABALIN 225 MG/1
1000 CAPSULE ORAL DAILY
Refills: 0 | Status: DISCONTINUED | OUTPATIENT
Start: 2021-03-22 | End: 2021-03-25

## 2021-03-22 RX ORDER — THIAMINE MONONITRATE (VIT B1) 100 MG
500 TABLET ORAL EVERY 8 HOURS
Refills: 0 | Status: COMPLETED | OUTPATIENT
Start: 2021-03-22 | End: 2021-03-25

## 2021-03-22 RX ORDER — FOLIC ACID 0.8 MG
1 TABLET ORAL DAILY
Refills: 0 | Status: DISCONTINUED | OUTPATIENT
Start: 2021-03-22 | End: 2021-03-25

## 2021-03-22 RX ORDER — OLANZAPINE 15 MG/1
5 TABLET, FILM COATED ORAL ONCE
Refills: 0 | Status: COMPLETED | OUTPATIENT
Start: 2021-03-22 | End: 2021-03-22

## 2021-03-22 RX ADMIN — PREGABALIN 1000 MICROGRAM(S): 225 CAPSULE ORAL at 18:49

## 2021-03-22 RX ADMIN — Medication 2 MILLIGRAM(S): at 02:21

## 2021-03-22 RX ADMIN — Medication 100 MILLIEQUIVALENT(S): at 17:07

## 2021-03-22 RX ADMIN — OLANZAPINE 5 MILLIGRAM(S): 15 TABLET, FILM COATED ORAL at 01:34

## 2021-03-22 RX ADMIN — Medication 1 TABLET(S): at 18:49

## 2021-03-22 RX ADMIN — Medication 40 MILLIEQUIVALENT(S): at 17:00

## 2021-03-22 RX ADMIN — Medication 105 MILLIGRAM(S): at 22:06

## 2021-03-22 RX ADMIN — Medication 1 MILLIGRAM(S): at 18:49

## 2021-03-22 RX ADMIN — Medication 40 MILLIEQUIVALENT(S): at 11:58

## 2021-03-22 RX ADMIN — OLANZAPINE 5 MILLIGRAM(S): 15 TABLET, FILM COATED ORAL at 23:50

## 2021-03-22 RX ADMIN — Medication 105 MILLIGRAM(S): at 15:32

## 2021-03-22 RX ADMIN — Medication 5 MILLIGRAM(S): at 11:58

## 2021-03-22 RX ADMIN — Medication 100 MILLIEQUIVALENT(S): at 13:06

## 2021-03-22 RX ADMIN — Medication 100 MILLIEQUIVALENT(S): at 20:39

## 2021-03-22 NOTE — BEHAVIORAL HEALTH ASSESSMENT NOTE - DETAILS
Brain tumor in mother Cognitive difficulties in mother after brain tumor surgery. OCD, anxiety in father, brother. Depression and anxiety in son. Denies SIIP.

## 2021-03-22 NOTE — BEHAVIORAL HEALTH ASSESSMENT NOTE - NSBHCHARTREVIEWVS_PSY_A_CORE FT
ICU Vital Signs Last 24 Hrs  T(C): 37 (21 Mar 2021 20:42), Max: 37 (21 Mar 2021 20:42)  T(F): 98.6 (21 Mar 2021 20:42), Max: 98.6 (21 Mar 2021 20:42)  HR: 87 (21 Mar 2021 20:42) (65 - 87)  BP: 142/98 (21 Mar 2021 20:42) (129/74 - 142/98)  BP(mean): --  ABP: --  ABP(mean): --  RR: 18 (21 Mar 2021 20:42) (18 - 18)  SpO2: 97% (21 Mar 2021 20:42) (97% - 98%) Vital Signs Last 24 Hrs  T(C): 37 (21 Mar 2021 20:42), Max: 37 (21 Mar 2021 20:42)  T(F): 98.6 (21 Mar 2021 20:42), Max: 98.6 (21 Mar 2021 20:42)  HR: 87 (21 Mar 2021 20:42) (87 - 87)  BP: 142/98 (21 Mar 2021 20:42) (142/98 - 142/98)  BP(mean): --  RR: 18 (21 Mar 2021 20:42) (18 - 18)  SpO2: 97% (21 Mar 2021 20:42) (97% - 97%)

## 2021-03-22 NOTE — BEHAVIORAL HEALTH ASSESSMENT NOTE - NSBHCHARTREVIEWIMAGING_PSY_A_CORE FT
< from: Xray Chest 1 View AP/PA (03.20.21 @ 17:39) >    EXAM:  XR CHEST AP OR PA 1V                            PROCEDURE DATE:  03/20/2021            INTERPRETATION:  CLINICAL INFORMATION: Shortness of breath. Evaluate for acute pathology.    TECHNIQUE: Frontal view of the chest.    COMPARISON: Frontal view of the chest 12/9/2020.    FINDINGS:    Barrel chested, consistent with history of COPD  The lungs are clear.  No pleural effusion or pneumothorax.  Heart size is within normal limits.  No acute abnormality within visible osseous structures.      IMPRESSION:    Clear lungs.    < end of copied text >

## 2021-03-22 NOTE — BEHAVIORAL HEALTH ASSESSMENT NOTE - SUICIDE PROTECTIVE FACTORS
Responsibility to family and others/Identifies reasons for living/Has future plans/Supportive social network of family or friends/Gnosticist beliefs

## 2021-03-22 NOTE — PROGRESS NOTE ADULT - SUBJECTIVE AND OBJECTIVE BOX
London Santos MD  Internal Medicine Resident  [phone]    PATIENT:  JUWAN OBANDO  [id number]    CHIEF COMPLAINT:  Patient is a 65y old  Female who presents with a chief complaint of post menopausal bleeding (21 Mar 2021 13:40)      INTERVAL HISTORY/OVERNIGHT EVENTS:      REVIEW OF SYSTEMS:    Constitutional:     [ ] negative [ ] fevers [ ] chills [ ] weight loss [ ] weight gain  HEENT:                  [ ] negative [ ] dry eyes [ ] eye irritation [ ] postnasal drip [ ] nasal congestion  CV:                         [ ] negative  [ ] chest pain [ ] orthopnea [ ] palpitations [ ] murmur  Resp:                     [ ] negative [ ] cough [ ] shortness of breath [ ] dyspnea [ ] wheezing [ ] sputum [ ] hemoptysis  GI:                          [ ] negative [ ] nausea [ ] vomiting [ ] diarrhea [ ] constipation [ ] abd pain [ ] dysphagia   :                        [ ] negative [ ] dysuria [ ] nocturia [ ] hematuria [ ] increased urinary frequency  Musculoskeletal: [ ] negative [ ] back pain [ ] myalgias [ ] arthralgias [ ] fracture  Skin:                       [ ] negative [ ] rash [ ] itch  Neurological:        [ ] negative [ ] headache [ ] dizziness [ ] syncope [ ] weakness [ ] numbness  Psychiatric:           [ ] negative [ ] anxiety [ ] depression  Endocrine:            [ ] negative [ ] diabetes [ ] thyroid problem  Heme/Lymph:      [ ] negative [ ] anemia [ ] bleeding problem  Allergic/Immune: [ ] negative [ ] itchy eyes [ ] nasal discharge [ ] hives [ ] angioedema    [ ] All other systems negative  [ ] Unable to assess ROS because ________.    MEDICATIONS:  MEDICATIONS  (STANDING):    MEDICATIONS  (PRN):  albuterol/ipratropium for Nebulization 3 milliLiter(s) Nebulizer every 6 hours PRN Shortness of Breath and/or Wheezing      ALLERGIES:  Allergies    Bananas (Vomiting; Diarrhea)  latex (Rash)  Levaquin (Other (Moderate))    Intolerances        OBJECTIVE:  ICU Vital Signs Last 24 Hrs  T(C): 37 (21 Mar 2021 20:42), Max: 37 (21 Mar 2021 20:42)  T(F): 98.6 (21 Mar 2021 20:42), Max: 98.6 (21 Mar 2021 20:42)  HR: 87 (21 Mar 2021 20:42) (62 - 87)  BP: 142/98 (21 Mar 2021 20:42) (129/74 - 142/98)  BP(mean): --  ABP: --  ABP(mean): --  RR: 18 (21 Mar 2021 20:42) (18 - 18)  SpO2: 97% (21 Mar 2021 20:42) (97% - 99%)      Adult Advanced Hemodynamics Last 24 Hrs  CVP(mm Hg): --  CVP(cm H2O): --  CO: --  CI: --  PA: --  PA(mean): --  PCWP: --  SVR: --  SVRI: --  PVR: --  PVRI: --  CAPILLARY BLOOD GLUCOSE        CAPILLARY BLOOD GLUCOSE        I&O's Summary    21 Mar 2021 07:01  -  22 Mar 2021 07:00  --------------------------------------------------------  IN: 960 mL / OUT: 0 mL / NET: 960 mL      Daily     Daily     PHYSICAL EXAMINATION:  General: WN/WD NAD  HEENT: PERRLA, EOMI, moist mucous membranes  Neurology: A&Ox3, nonfocal, RAMOS x 4  Respiratory: CTA B/L, normal respiratory effort, no wheezes, crackles, rales  CV: RRR, S1S2, no murmurs, rubs or gallops  Abdominal: Soft, NT, ND +BS, Last BM  Extremities: No edema, + peripheral pulses  Incisions:   Tubes:    LABS:                          11.1   6.43  )-----------( 387      ( 21 Mar 2021 06:37 )             33.7     03-21    140  |  102  |  8   ----------------------------<  98  3.1<L>   |  26  |  0.85    Ca    9.2      21 Mar 2021 06:37  Phos  2.5     03-21  Mg     1.8     03-21    TPro  6.0  /  Alb  3.0<L>  /  TBili  0.4  /  DBili  x   /  AST  17  /  ALT  14  /  AlkPhos  51  03-20    LIVER FUNCTIONS - ( 20 Mar 2021 15:52 )  Alb: 3.0 g/dL / Pro: 6.0 g/dL / ALK PHOS: 51 U/L / ALT: 14 U/L / AST: 17 U/L / GGT: x           PT/INR - ( 20 Mar 2021 15:52 )   PT: 12.9 sec;   INR: 1.08 ratio         PTT - ( 20 Mar 2021 15:52 )  PTT:27.4 sec        Urinalysis Basic - ( 20 Mar 2021 21:40 )    Color: Light Orange / Appearance: Slightly Turbid / S.017 / pH: x  Gluc: x / Ketone: Negative  / Bili: Negative / Urobili: Negative   Blood: x / Protein: 30 mg/dL / Nitrite: Negative   Leuk Esterase: Negative / RBC: 1633 /hpf / WBC 8 /HPF   Sq Epi: x / Non Sq Epi: 0 /hpf / Bacteria: Negative        TELEMETRY:     EKG:     IMAGING:       London Santos MD  Internal Medicine Resident  [phone]    PATIENT:  JUWAN OBANDO  [id number]    CHIEF COMPLAINT:  Patient is a 65y old  Female who presents with a chief complaint of post menopausal bleeding (21 Mar 2021 13:40)      INTERVAL HISTORY/OVERNIGHT EVENTS:  - overnight patient agitated, responding to internal stimuli, given ativan  - oriented to person, month, not year, not place  - states "her team is working on figuring out the trick behind those people down there"  - denies headache, cp, sob, dysuria  - seen ambulating this morning  - refused CT scan twice at this point despite multiple explanations as to its risks/benefits  - son this morning gave additional history, may have been using cocaine at home with  and was being given Valium prn at home for agitated behavior    MEDICATIONS:  MEDICATIONS  (STANDING):    MEDICATIONS  (PRN):  albuterol/ipratropium for Nebulization 3 milliLiter(s) Nebulizer every 6 hours PRN Shortness of Breath and/or Wheezing      ALLERGIES:  Allergies    Bananas (Vomiting; Diarrhea)  latex (Rash)  Levaquin (Other (Moderate))  Intolerances    OBJECTIVE:  ICU Vital Signs Last 24 Hrs  T(C): 37 (21 Mar 2021 20:42), Max: 37 (21 Mar 2021 20:42)  T(F): 98.6 (21 Mar 2021 20:42), Max: 98.6 (21 Mar 2021 20:42)  HR: 87 (21 Mar 2021 20:42) (62 - 87)  BP: 142/98 (21 Mar 2021 20:42) (129/74 - 142/98)    RR: 18 (21 Mar 2021 20:42) (18 - 18)  SpO2: 97% (21 Mar 2021 20:42) (97% - 99%)    I&O's Summary    21 Mar 2021 07:01  -  22 Mar 2021 07:00  --------------------------------------------------------  IN: 960 mL / OUT: 0 mL / NET: 960 mL    PHYSICAL EXAMINATION:  General: WN/WD NAD, frail appearing, cane at bedside  HEENT: PERRL, EOMI, moist mucous membranes  Neurology: A&O to person, month, not year or place, has delusions of conspiracy relating to CT scan, nonfocal, RAMOS x 4, ambulatory with minimal assistance  Respiratory: CTA B/L, normal respiratory effort, no wheezes, crackles, rales  CV: RRR, S1S2, no murmurs, rubs or gallops  Abdominal: Soft, NT, ND +BS  : no blood on pelvic digital exam, external genitalia wnl, no perineal anesthesia  Extremities: No edema, +peripheral pulses    LABS:                          11.1   6.43  )-----------( 387      ( 21 Mar 2021 06:37 )             33.7     03-21    140  |  102  |  8   ----------------------------<  98  3.1<L>   |  26  |  0.85    Ca    9.2      21 Mar 2021 06:37  Phos  2.5     03-21  Mg     1.8     -21    TPro  6.0  /  Alb  3.0<L>  /  TBili  0.4  /  DBili  x   /  AST  17  /  ALT  14  /  AlkPhos  51  03-20    LIVER FUNCTIONS - ( 20 Mar 2021 15:52 )  Alb: 3.0 g/dL / Pro: 6.0 g/dL / ALK PHOS: 51 U/L / ALT: 14 U/L / AST: 17 U/L / GGT: x           PT/INR - ( 20 Mar 2021 15:52 )   PT: 12.9 sec;   INR: 1.08 ratio         PTT - ( 20 Mar 2021 15:52 )  PTT:27.4 sec        Urinalysis Basic - ( 20 Mar 2021 21:40 )    Color: Light Orange / Appearance: Slightly Turbid / S.017 / pH: x  Gluc: x / Ketone: Negative  / Bili: Negative / Urobili: Negative   Blood: x / Protein: 30 mg/dL / Nitrite: Negative   Leuk Esterase: Negative / RBC: 1633 /hpf / WBC 8 /HPF   Sq Epi: x / Non Sq Epi: 0 /hpf / Bacteria: Negative        TELEMETRY:     EKG:     IMAGING:

## 2021-03-22 NOTE — BEHAVIORAL HEALTH ASSESSMENT NOTE - ACTIVATING EVENTS/STRESSORS
Non-compliant or not receiving treatment/Change in provider or treatment (i.e., medications, psychotherapy, milieu)/Acute medical problem/Recent inpatient discharge

## 2021-03-22 NOTE — BEHAVIORAL HEALTH ASSESSMENT NOTE - DESCRIPTION (FIRST USE, LAST USE, QUANTITY, FREQUENCY, DURATION)
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 Son reports that he hasn't seen her smoke recently however.

## 2021-03-22 NOTE — BEHAVIORAL HEALTH ASSESSMENT NOTE - NSBHCHARTREVIEWLAB_PSY_A_CORE FT
10.4   6.09  )-----------( 374      ( 22 Mar 2021 09:03 )             32.9   -    141  |  104  |  11  ----------------------------<  88  2.8<LL>   |  27  |  0.88    Ca    9.2      22 Mar 2021 09:03  Phos  2.9       Mg     2.0         TPro  6.2  /  Alb  3.3  /  TBili  0.3  /  DBili  x   /  AST  22  /  ALT  18  /  AlkPhos  52  -    Urinalysis Basic - ( 20 Mar 2021 21:40 )    Color: Light Orange / Appearance: Slightly Turbid / S.017 / pH: x  Gluc: x / Ketone: Negative  / Bili: Negative / Urobili: Negative   Blood: x / Protein: 30 mg/dL / Nitrite: Negative   Leuk Esterase: Negative / RBC: 1633 /hpf / WBC 8 /HPF   Sq Epi: x / Non Sq Epi: 0 /hpf / Bacteria: Negative

## 2021-03-22 NOTE — BEHAVIORAL HEALTH ASSESSMENT NOTE - RISK ASSESSMENT
pt overall low risk for suicide attempt, no si/hi, no hx attempts, future oriented, risk factors include anxiety Low Acute Suicide Risk
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
## 2021-03-22 NOTE — CONSULT NOTE ADULT - SUBJECTIVE AND OBJECTIVE BOX
NEUROLOGY CONSULT NOTE    66 yo F w/ PMHx of COPD (not on home O2 or BIPAP), gastric and duodenal ulcers (H. pylori negative), lumbar spinal stenosis s/p laminectomy (2017), anxiety, history of alcohol and benzodiazepine abuse w/ recent admission 02/10 - 02/16 for benzodiazepine withdrawal admitted for concerns of vaginal bleeding. Neurology consulted for concerns of auditory hallucinations overnight. While in the hospital, patient was noted to be preoccupied with internal stimuli and voices she had been hearing lasting for a few minutes. Patient was responsive to external stimuli and alert during the episode, but given concerns for possible withdrawal was given Ativan to address the etiology. As per chart review and son, patient has been previously on Valium 10mg TID for 10-15 years that was recently reduced to 2mg TID in November 2020 by another psychiatrist (original psychiatrist retired). She did not like the new psychiatrist and therefore ran out of her Valium after 1 month. As per son (Mr. Yann Bradshaw), in January she noted to have an episode of auditory and visual hallucinations, where patient was concerned that people were within the walls talking to her and trying to get to her. This was attributed to her Valium withdrawal at the time. As per son, patient's health has been deteriorating over the past month (initial deterioration noted at end of January). She had been more disorganized and unable to keep focus over the span of a long conversation. She would often be "off in her own world" while at home, but responsive to stimuli as per son. She had been sleeping 18 hours a day and only gets up to eat lunch (soup and crackers) and dinner ([employer] sandwich- smokehouse BBQ chicken), has not had vegetables in a while. Over the past month she has had unintentional 42lb weight loss without associated nausea, vomiting, diarrhea or changes in appetite, as per patient. She also has been noted to have paranoia over the past 8-10 months, endorsing people are coming to kidnap her and that they are within the walls. Of note, patient has also been having urinary and fecal incontinence since January 2021 (new onset), but most of the time able to make it to the restroom before "having an accident". She ambulates independently while at home, but uses a cane while outdoors. Denies any fever, dizziness, paresthesias, weakness, numbness or tingling of any of her extremities. ROS otherwise negative.     MEDICATIONS  (STANDING):  thiamine IVPB 500 milliGRAM(s) IV Intermittent every 8 hours    MEDICATIONS  (PRN):  albuterol/ipratropium for Nebulization 3 milliLiter(s) Nebulizer every 6 hours PRN Shortness of Breath and/or Wheezing    PAST MEDICAL & SURGICAL HISTORY:  Chronic midline back pain, unspecified back location  Anxiety  GERD (gastroesophageal reflux disease)  COPD (chronic obstructive pulmonary disease)  H/O laminectomy    FH: cholecystectomy    FAMILY HISTORY:  FH: brain tumor  maternal    FH: breast cancer  paternal sister    Allergies  Bananas (Vomiting; Diarrhea)  latex (Rash)  Levaquin (Other (Moderate))    Intolerances    SHx - No smoking, No ETOH, No drug abuse      Vital Signs Last 24 Hrs  T(C): 37 (21 Mar 2021 20:42), Max: 37 (21 Mar 2021 20:42)  T(F): 98.6 (21 Mar 2021 20:42), Max: 98.6 (21 Mar 2021 20:42)  HR: 87 (21 Mar 2021 20:42) (62 - 87)  BP: 142/98 (21 Mar 2021 20:42) (129/74 - 142/98)  BP(mean): --  RR: 18 (21 Mar 2021 20:42) (18 - 18)  SpO2: 97% (21 Mar 2021 20:42) (97% - 99%)    General Exam:   General appearance: No acute distress    Cardiac:  Pulm:               Neurological Exam:  Mental Status: Oriented to date (day of week, month, year), not self or place [place] states [place] first name, but unable to state last name with prompting). Transiently intact attention, has tangential thought process at times. No dysarthria. Speech fluent. Repetition, Naming intact. Follows simple commands.    Cranial Nerves:   PERRL, EOMI, VFF, no nystagmus. CN V1-3 intact to light touch.  No facial asymmetry.  Hearing intact grossly to conversation.  Tongue, uvula and palate midline.  Sternocleidomastoid and Trapezius intact bilaterally.    Motor:   Tone: normal.                  Strength:     [] Upper extremity                            Delt       Bicep    Tricep                                                  R         5/5        5/5        5/5       5/5                                               L          5/5        5/5        5/5       5/5  [] Lower extremity                            HF          KE          KF        DF         PF                                               R        5/5 5/5 5/5 5/5 5/5                                               L         5/5 5/5 5/5 5/5 5/5  Pronator drift: none                 Dysmetria: None to finger-nose-finger or heel-shin-heel. No truncal ataxia.    Tremor: No resting, postural or action tremor.  No positive myoclonus.  Sensation: intact to light touch, temperature throughout.     Deep Tendon Reflexes:            Biceps            Triceps         BR        Patellar          Ankle            Babinski                                         R       2+                   2+           2+            2+               2+           downgoing                                         L        2+                  2+            2+            2+               2+           downgoing    Gait:     Other:    03-21    140  |  102  |  8   ----------------------------<  98  3.1<L>   |  26  |  0.85    Ca    9.2      21 Mar 2021 06:37  Phos  2.5     03-21  Mg     1.8     03-21    TPro  6.0  /  Alb  3.0<L>  /  TBili  0.4  /  DBili  x   /  AST  17  /  ALT  14  /  AlkPhos  51  03-20                            10.4   6.09  )-----------( 374      ( 22 Mar 2021 09:03 )             32.9       Radiology            NEUROLOGY CONSULT NOTE    66 yo F w/ PMHx of COPD (not on home O2 or BIPAP), gastric and duodenal ulcers (H. pylori negative), lumbar spinal stenosis s/p laminectomy (2017), anxiety, history of alcohol and benzodiazepine abuse w/ recent admission 02/10 - 02/16 for benzodiazepine withdrawal admitted for concerns of vaginal bleeding. Neurology consulted for concerns of auditory hallucinations overnight. While in the hospital, patient was noted to be preoccupied with internal stimuli and voices she had been hearing lasting for a few minutes. Patient was responsive to external stimuli and alert during the episode, but given concerns for possible withdrawal was given Ativan to address the etiology. As per chart review and son, patient has been previously on Valium 10mg qd for 10-15 years that was recently reduced to 2mg TID in November 2020 by another psychiatrist (original psychiatrist retired). She did not like the new psychiatrist and therefore ran out of her Valium after 1 month. As per son (Mr. Yann Bradshaw), in January she noted to have an episode of auditory and visual hallucinations, where patient was concerned that people were within the walls talking to her and trying to get to her. This was attributed to her Valium withdrawal at the time. As per son, patient's health has been deteriorating over the past month (initial deterioration noted at end of January). She had been more disorganized and unable to keep focus over the span of a long conversation. She would often be "off in her own world" while at home, but responsive to stimuli as per son. She had been sleeping 18 hours a day and only gets up to eat lunch (soup and crackers) and dinner ([employer] sandwich- smokehouse BBQ chicken), has not had vegetables in a while. Over the past month she has had unintentional 42lb weight loss without associated nausea, vomiting, diarrhea or changes in appetite, as per patient. She also has been noted to have paranoia over the past 8-10 months, endorsing people are coming to kidnap her and that they are within the walls. Of note, patient has also been having urinary and fecal incontinence since January 2021 (new onset), but most of the time able to make it to the restroom before "having an accident". She ambulates independently while at home, but uses a cane while outdoors. Denies any fever, dizziness, paresthesias, weakness, numbness or tingling of any of her extremities. ROS otherwise negative.     MEDICATIONS  (STANDING):  thiamine IVPB 500 milliGRAM(s) IV Intermittent every 8 hours    MEDICATIONS  (PRN):  albuterol/ipratropium for Nebulization 3 milliLiter(s) Nebulizer every 6 hours PRN Shortness of Breath and/or Wheezing    PAST MEDICAL & SURGICAL HISTORY:  Chronic midline back pain, unspecified back location  Anxiety  GERD (gastroesophageal reflux disease)  COPD (chronic obstructive pulmonary disease)  H/O laminectomy    FH: cholecystectomy    FAMILY HISTORY:  FH: brain tumor  maternal    FH: breast cancer  paternal sister    Allergies  Bananas (Vomiting; Diarrhea)  latex (Rash)  Levaquin (Other (Moderate))    Intolerances    SHx - No smoking, No ETOH, No drug abuse      Vital Signs Last 24 Hrs  T(C): 37 (21 Mar 2021 20:42), Max: 37 (21 Mar 2021 20:42)  T(F): 98.6 (21 Mar 2021 20:42), Max: 98.6 (21 Mar 2021 20:42)  HR: 87 (21 Mar 2021 20:42) (62 - 87)  BP: 142/98 (21 Mar 2021 20:42) (129/74 - 142/98)  BP(mean): --  RR: 18 (21 Mar 2021 20:42) (18 - 18)  SpO2: 97% (21 Mar 2021 20:42) (97% - 99%)    General Exam:   General appearance: No acute distress    Cardiac:  Pulm:               Neurological Exam:  Mental Status: Oriented to date (day of week, month, year), not self or place [place], states her first name, but unable to state last name with prompting). Transiently intact attention, has tangential thought process at times. No dysarthria. Speech fluent. Repetition, Naming intact. Follows simple commands.    Cranial Nerves:   PERRL, EOMI, VFF, no nystagmus. CN V1-3 intact to light touch.  No facial asymmetry.  Hearing intact grossly to conversation.  Tongue, uvula and palate midline.  Sternocleidomastoid and Trapezius intact bilaterally.    Motor:   Tone: normal.                  Strength:     [] Upper extremity                            Delt       Bicep    Tricep                                                  R         5/5        5/5        5/5       5/5                                               L          5/5        5/5        5/5       5/5  [] Lower extremity                            HF          KE          KF        DF         PF                                               R        5/5 5/5 5/5 5/5 5/5                                               L         5/5 5/5 5/5 5/5 5/5  Pronator drift: none                 Dysmetria: None to finger-nose-finger or heel-shin-heel. No truncal ataxia.    Tremor: No resting, postural or action tremor.  No positive myoclonus.  Sensation: intact to light touch, temperature throughout.     Deep Tendon Reflexes:            Biceps            Triceps         BR        Patellar          Ankle            Babinski                                         R       2+                   2+           2+            2+               2+           downgoing                                         L        2+                  2+            2+            2+               2+           downgoing    Gait: normal gait, some unsteadiness with turn    Other:    03-21    140  |  102  |  8   ----------------------------<  98  3.1<L>   |  26  |  0.85    Ca    9.2      21 Mar 2021 06:37  Phos  2.5     03-21  Mg     1.8     03-21    TPro  6.0  /  Alb  3.0<L>  /  TBili  0.4  /  DBili  x   /  AST  17  /  ALT  14  /  AlkPhos  51  03-20                            10.4   6.09  )-----------( 374      ( 22 Mar 2021 09:03 )             32.9       Radiology

## 2021-03-22 NOTE — CONSULT NOTE ADULT - ASSESSMENT
64 yo F w/ PMHx of COPD (not on home O2 or BIPAP), gastric and duodenal ulcers (H. pylori negative), lumbar spinal stenosis s/p laminectomy (2017), anxiety, history of alcohol and benzodiazepine abuse w/ recent admission 02/10 - 02/16 for benzodiazepine withdrawal admitted for concerns of vaginal bleeding. Neurology consulted for concerns of auditory hallucinations overnight. Neurologic examination reveals deficits in attention with tangential thought process. History also reveals significant weight loss (Feb ED note reveals 49kg, currently 41kg- 20lb wt loss) over the past month with relatively new onset urinary and fecal incontinence and poor dietary regimen. 64 yo F w/ PMHx of COPD (not on home O2 or BIPAP), gastric and duodenal ulcers (H. pylori negative), lumbar spinal stenosis s/p laminectomy (2017), anxiety, history of alcohol and benzodiazepine abuse w/ recent admission 02/10 - 02/16 for benzodiazepine withdrawal admitted for concerns of vaginal bleeding. Neurology consulted for concerns of auditory hallucinations overnight. Neurologic examination reveals deficits in attention with tangential thought process and confabulation. History also reveals significant weight loss (Feb ED note reveals 49kg, currently 41kg- 20lb wt loss) over the past month with relatively new onset urinary and fecal incontinence and poor dietary regimen. Differential includes toxic/metabolic encephalopathy secondary to poor dietary/vitamin intake (e.g. Wernicke's encephalopathy), would recommend further neuroimaging and EEG studies to rule out any other underlying neurologic process (e.g. NPH).    Recommendations:   [ ] MR head w/w/o contrast  [ ] routine EEG  [ ] Please send B1, B6, B12, Folate, TFTs, Heavy metal screen  [ ] urine toxicology    Case seen and discussed with Neurology attending, Dr. Jenkins   66 yo F w/ PMHx of COPD (not on home O2 or BIPAP), gastric and duodenal ulcers (H. pylori negative), lumbar spinal stenosis s/p laminectomy (2017), anxiety, history of alcohol and benzodiazepine abuse w/ recent admission 02/10 - 02/16 for benzodiazepine withdrawal admitted for concerns of vaginal bleeding. Neurology consulted for concerns of auditory hallucinations overnight. Neurologic examination reveals deficits in attention with tangential thought process and confabulation. History also reveals significant weight loss (Feb ED note reveals 49kg, currently 41kg- 20lb wt loss) over the past month with relatively new onset urinary and fecal incontinence and poor dietary regimen. Differential includes toxic/metabolic encephalopathy secondary to poor dietary/vitamin intake (e.g. Wernicke's encephalopathy), would recommend further neuroimaging and EEG studies to rule out any other underlying neurologic process (e.g. NPH).    Recommendations:   [ ] MR head w/w/o contrast  [ ] routine EEG  [ ] Please send B1, B12, Folate, TFTs, Heavy metal screen  [ ] urine toxicology    Case seen and discussed with Neurology attending, Dr. Jenkins

## 2021-03-22 NOTE — BEHAVIORAL HEALTH ASSESSMENT NOTE - HPI (INCLUDE ILLNESS QUALITY, SEVERITY, DURATION, TIMING, CONTEXT, MODIFYING FACTORS, ASSOCIATED SIGNS AND SYMPTOMS)
Patient is a 64 yo female, domiciled with  and son, retired, , with PPHx of depression and anxiety, PMH of COPD. PUD, lumbar spinal stenosis s/p laminectomy, history of alcohol and benzodiazepine use, who presented to the ED for vaginal bleeding. Psych consulted for reported symptoms of psychosis including hallucinations and paranoia.     Events leading up to admission: Lost 45 lbs over the last months; eats 2 meals a day (soup/crackers at noon, chicken sandwich/cheese from [employer] for dinner), consistently sleeping 16 hours/day since last admission. over the past 6 months, appetite decreased, stopped drinking water, says she hates water now; all she drinks now is gingerale. blood in stool --> ED.     on valium 10mg for 10-15 years. very high anxiety > depression. psychiatrist retired in november, new psychiatrist in november, valium cut down to 2mg x3. she ran through 1month supply that in 2 weeks because still taking 10 mg. in february, sent home with week supply. have to force her to take them now; complaining of chest pain.      Patient denies SIIP/HIIP. No weapons at home.  Symptoms of depression - sadness? interest? guilt? concentration? appetite? sleep?  Paranoia/Hallucinations? june-july 2020; thinks people coming to kidnap her. says hears people in the walls for 4-5 months until december while still on valium.     Outpatient psychiatrist/meds: Dr. Emi Javier at [facility]; Dr. Enriqueta Ortega [address] [place] [address]  PMH: COPD, PUD,   Meds: Dilaudid - hasn't taken it in months  Allergies: banana, latex, Levaquin   Substance use:  Alcohol: hasn't drank in over 10 years.   Benzos: valium  Cocaine: no cocaine use recently, but history  Cigarettes: 1 ppd for many years    Family history of psych conditions: depression, anxiety (son),   Social history: Lives at home with  and 30 yo son. Used to work as a  for 18 years, now retired. Has been  for  ___ years. Reports being very Orthodox. History of trauma or abuse?  at [facility], heavy cocaine user; hospitalized after fiance killed; suicidal attempt; hospitalized for a month. Patient is a 66 yo female, domiciled with  and son, retired, , with PPHx of depression and anxiety, PMH of COPD, PUD, lumbar spinal stenosis s/p laminectomy, history of alcohol and benzodiazepine abuse, on Valium 10 mg for 20+ years, who presented to the ED for vaginal/rectal bleeding. Psych consulted for reported symptoms of psychosis including hallucinations and paranoia.     Patient poor historian and reports feeling great today. Reports that she has a history of depression and anxiety for which she takes the Valium; says she's been on it for many years and her symptoms have been well controlled. However, she reports that she wanted to stop taking it as she did not want to be "associated with the kind of people who take that stuff." She endorses history of visual hallucinations where she would see "animals and other nice things," but denies recent episodes of any hallucinations, reporting that they only occurred when she took Levaquin(?). Denies auditory hallucinations. No SIIP/HIIP. No weapons at home.    Patient's son, Ramirez, contacted for collateral information: reports that they brought patient into hospital as she lost ~45 lbs over the past 1-2 months. He wasn't sure how she may have lost this weight; thinks she's been having decreased appetite over the past 6 months. Typically, she eats 2 meals a day, soup/crackers at noon and cheese and chicken sandwich from [employer] for dinner. He also reports that she stopped drinking water and "hates water now, out of nowhere," and all she drinks is ginger ale now. He also reports that patient has been sleeping 16+ hours/day ever since the last admission in February. On Saturday, they noticed patient had blood in toilet after using the bathroom which prompted the ED visit.     Patient's son reports that the patient has been on valium 10 mg for at least 10-15 years now for her history of depression and anxiety. However, when her psychiatrist retired in November, she started seeing an NP, Emi Abreu, who decreased the valium dose to 2 mg TID. Per the son, the patient ran out of pills as she completed a 1 month supply in ~2 weeks and her behavioral health provider was concerned for benzodiazepine withdrawal, recommending she come into the hospital. Following the hospitalization, patient was sent home with 1 week supply of valium; however patient refusing to take as she complained it gave her chest pain and the family has had to force her to take "a pill every now and then."     Patient's son reports that hallucinations and paranoia started in June/July 2020. She would endorse thoughts of people coming to kidnap her and would report that she hears people in the walls. Patient's son is not aware of any visual hallucinations. Reports these symptoms persisted for 4-5 months until December but not since as far as he is aware.     Per behavioral health note from February: When pandemic started, pt was doing okay. In April/May 2020, pt started to become paranoid that someone was coming to get her/kidnap her. This continued throughout 2020 and got worse. Pt has aslo been stating that bugs are crawling on her skin for 3-4 months. Starting in January, pt was started on a Valium taper by her new PMD (Dr. Jones) and outpt  provider MACO Lemus. In the past 2-3 weeks ago, she started hearing voices and speaking with the voices. Last Saturday 2/6/21, she stayed in the shower for 4 hours because the voices were telling her to stay in the shower, and patient had to be taken out of the shower by her  and son. Pt changed her clothes 6 times in 30min recently because she heard voices disapproving her clothes. Valium ran out 2 weeks ago or so. Beginning of January, Abreu started to taper patient.  Pt has also had some slurred speech, shakiness, dizziness, sleeping poorly, not eating. No SIB/SA. Hydromorphone was stopped as well 2-3 weeks ago, and pain has been variable.    Per outpt PMD note 2/10/2021 by Dr. Enriqueta Jones  Called son to discuss, was on valium but cut down by other doctor (saw RACHELL Abreu, who works with psychiatrist Dr. Boyd, phone [phone]), so pt ran through all medication quicker and ran out a few weeks (?) ago, also stopped hydromorphone few weeks ago as well. Son noting past few weeks pt having auditory hallucinations, hears people in the walls, paranoid thoughts, barely sleeping. Called EMS 2 days ago, taken to [facility] psych evaluated her and offered voluntary admission but she declined, didn't want psych hospitalization. Spoke to pt, repeating words frequently during discussion, paranoid thoughts, sounds very shaky over the phone, son notes having tremors, very concerned for benzo withdrawal (psychosis, perceptual disturbances, tremors, anxiety), advised to call EMS and go back to ER. Patient is a 66 yo woman, domiciled with  and son, retired, , with PPHx of depression and anxiety, PMH of COPD, PUD, lumbar spinal stenosis s/p laminectomy, history of alcohol and benzodiazepine abuse, on Valium 10 mg for 20+ years, who presented to the ED for vaginal/rectal bleeding and a 40 lb weight loss. Psych consulted for reported symptoms of psychosis including hallucinations and paranoia.     Patient poor historian and reports feeling great today. Reports that she has a history of depression and anxiety for which she takes the Valium; says she's been on it for many years and her symptoms have been well controlled. However, she reports that she wanted to stop taking it as she did not want to be "associated with the kind of people who take that stuff." She endorses history of visual hallucinations where she would see "animals and other nice things," but denies recent episodes of any hallucinations, reporting that they only occurred when she took Levaquin(?). Denies auditory hallucinations. No SIIP/HIIP. No weapons at home.    Patient's son, Ramirez, contacted for collateral information: reports that they brought patient into hospital as she lost ~45 lbs over the past 1-2 months. He wasn't sure how she may have lost this weight; thinks she's been having decreased appetite over the past 6 months. Typically, she eats 2 meals a day, soup/crackers at noon and cheese and chicken sandwich from [employer] for dinner. He also reports that she stopped drinking water and "hates water now, out of nowhere," and all she drinks is ginger ale now. He also reports that patient has been sleeping 16+ hours/day ever since the last admission in February. On Saturday, they noticed patient had blood in toilet after using the bathroom which prompted the ED visit.     Patient's son reports that the patient has been on valium 10 mg for at least 10-15 years now for her history of depression and anxiety. However, when her psychiatrist retired in November, she started seeing an NP, Emi Abreu, who decreased the valium dose to 2 mg TID. Per the son, the patient ran out of pills as she completed a 1 month supply in ~2 weeks and her behavioral health provider was concerned for benzodiazepine withdrawal, recommending she come into the hospital. Following the hospitalization, patient was sent home with 1 week supply of valium; however patient refusing to take as she complained it gave her chest pain and the family has had to force her to take "a pill every now and then."     Patient's son reports that hallucinations and paranoia started in June/July 2020. She would endorse thoughts of people coming to kidnap her and would report that she hears people in the walls. Patient's son is not aware of any visual hallucinations. Reports these symptoms persisted for 4-5 months until December but not since as far as he is aware.     Per behavioral health note from February: When pandemic started, pt was doing okay. In April/May 2020, pt started to become paranoid that someone was coming to get her/kidnap her. This continued throughout 2020 and got worse. Pt has aslo been stating that bugs are crawling on her skin for 3-4 months. Starting in January, pt was started on a Valium taper by her new PMD (Dr. Jones) and outpt  provider MACO Lemus. In the past 2-3 weeks ago, she started hearing voices and speaking with the voices. Last Saturday 2/6/21, she stayed in the shower for 4 hours because the voices were telling her to stay in the shower, and patient had to be taken out of the shower by her  and son. Pt changed her clothes 6 times in 30min recently because she heard voices disapproving her clothes. Valium ran out 2 weeks ago or so. Beginning of January, Abreu started to taper patient.  Pt has also had some slurred speech, shakiness, dizziness, sleeping poorly, not eating. No SIB/SA. Hydromorphone was stopped as well 2-3 weeks ago, and pain has been variable.    Per outpt PMD note 2/10/2021 by Dr. Enriqueta Jones  Called son to discuss, was on valium but cut down by other doctor (saw RACHELL Abreu, who works with psychiatrist Dr. Boyd, phone [phone]), so pt ran through all medication quicker and ran out a few weeks (?) ago, also stopped hydromorphone few weeks ago as well. Son noting past few weeks pt having auditory hallucinations, hears people in the walls, paranoid thoughts, barely sleeping. Called EMS 2 days ago, taken to [facility], psych evaluated her and offered voluntary admission but she declined, didn't want psych hospitalization. Spoke to pt, repeating words frequently during discussion, paranoid thoughts, sounds very shaky over the phone, son notes having tremors, very concerned for benzo withdrawal (psychosis, perceptual disturbances, tremors, anxiety), advised to call EMS and go back to ER.

## 2021-03-22 NOTE — PROGRESS NOTE ADULT - PROBLEM SELECTOR PLAN 2
- not on home O2  - PRN Duoneb - unclear bleeding source   - need CT with contrast but patient has refused multiple times  - psych eval appreciated  - TVUS pending but unclear benefit  - check FOBT  - hold of VTE ppx i/s/o possible bleed

## 2021-03-22 NOTE — PROGRESS NOTE ADULT - PROBLEM SELECTOR PLAN 1
- GYN consult  - maintain active type and screen, transfuse if Hbg < 7 or if hemodynamically stable in the setting of bleeding  - f/u CT abd/pelvis w/ IV contrast to r/o mass  - transvaginal ultrasound  - TSH wnl - pt acutely altered 3/21 in pm, has been ahving issues with paranoia and hallucinations at home for year prior to admission  - c/f cocaine use? vs benzo withdrawal vs paraneoplastic disease (40 lb weight loss)  - neuro - rec MRI - unclear if patient will be able to tolerate exam  - psychiatric recs appreciated  - utox  - B12/folate/thiamine levels ordered  - heavy metal screen  - TSH wnl

## 2021-03-22 NOTE — PROVIDER CONTACT NOTE (CHANGE IN STATUS NOTIFICATION) - SITUATION
pt agitated, confused, hallucinating stating there are people outside and she needs to go to a school, shes going to get in trouble, pt afraid that IV contrast will kill her and that Dr. Abreu is out to get her and she needs to go before that happens, unable to maintain proper conversation without changing topic

## 2021-03-22 NOTE — BEHAVIORAL HEALTH ASSESSMENT NOTE - OTHER PAST PSYCHIATRIC HISTORY (INCLUDE DETAILS REGARDING ONSET, COURSE OF ILLNESS, INPATIENT/OUTPATIENT TREATMENT)
Diagnoses: Anxiety, depression, insomnia  Inpatient admission: remote in her 20's at [facility]) due to worsening depression and being suicidal after fiance's death at the time, and needing to detox from alcohol + cocaine binge  Outpatient treatment: Martine POLANCO [id number], currently with Emi DEWEY at [facility]  No prior SA/SIB

## 2021-03-22 NOTE — CHART NOTE - NSCHARTNOTEFT_GEN_A_CORE
Reference #: [id number]  Others' Prescriptions  Patient Name: Kenia Bradshaw  YOB: 1955  Address: [address] [place] [address]  Sex: Female  Rx Written	Rx Dispensed	Drug	Quantity	Days Supply	Prescriber Name	Payment Method	Dispenser  01/05/2021 01/05/2021 	diazepam 2 mg tablet 	90 	30 	[facility] 	[facility]  12/22/2020 12/23/2020 	diazepam 5 mg tablet 	28 	14 	Enriqueta Jones) 	Health system Pharmacy  10/28/2020 	10/28/2020 	diazepam 5 mg tablet 	90 	30 	Kenia Kelly MD 	Health system Pharmacy  08/19/2020 08/20/2020 	diazepam 5 mg tablet 	90 	30 	Kenia Klely MD 	Health system Pharmacy  06/17/2020 06/20/2020 	diazepam 5 mg tablet 	90 	30 	Kenia Kelly MD 	Health system Pharmacy  04/02/2020 04/08/2020 	diazepam 5 mg tablet 	90 	30 	Kenia Kelly MD 	Health system Pharmacy  04/02/2020 04/08/2020 	diazepam 2 mg tablet 	90 	30 	Kenia Kelly MD 	Health system Pharmacy    Patient Name: Kenia Bradshaw  YOB: 1955  Address: [address] [place] [address]  Sex: Female  Rx Written	Rx Dispensed	Drug	Quantity	Days Supply	Prescriber Name	Payment Method	Dispenser  12/15/2020 	12/15/2020 	hydromorphone 4 mg tablet 	65 	30 	Yolanda Landin 	[facility]  11/10/2020 	11/10/2020 	hydromorphone 4 mg tablet 	65 	30 	Yolanda Landin 	[facility]  10/13/2020 	10/13/2020 	hydromorphone 4 mg tablet 	65 	30 	Yolanda Landin 	[facility]  09/15/2020 	09/15/2020 	hydromorphone 4 mg tablet 	65 	30 	Yolanda Landin 	[facility]  07/15/2020 	07/15/2020 	hydromorphone 4 mg tablet 	65 	30 	Yolanda Landin 	[facility]  06/16/2020 06/16/2020 	hydromorphone 4 mg tablet 	65 	30 	Yolanda Landin 	[facility]  05/19/2020 05/19/2020 	hydromorphone 4 mg tablet 	65 	30 	Yolanda Landin 	Insurance 	[facility]  04/07/2020 04/07/2020 	hydromorphone 4 mg tablet 	65 	30 	Yolanda Landin 	[facility]    Patient Name: Patricia Bradshaw  YOB: 1955  Address: [address] [place] [address]  Sex: Female  Rx Written	Rx Dispensed	Drug	Quantity	Days Supply	Prescriber Name	Payment Method	Dispenser  02/16/2021 02/16/2021 	diazepam 2 mg tablet 	21 	7 	Stella Emerson 	[id number]

## 2021-03-22 NOTE — BEHAVIORAL HEALTH ASSESSMENT NOTE - CASE SUMMARY
Patient is a 64 yo female, domiciled with  and son, retired, , with PPHx of depression and anxiety, PMH of COPD, PUD, lumbar spinal stenosis s/p laminectomy, history of alcohol and benzodiazepine abuse, on Valium 10 mg for 20+ years, who presented to the ED for vaginal/rectal bleeding. Psych consulted for reported symptoms of psychosis including hallucinations and paranoia, however these symptoms started prior to the Valium taper and has persisted despite patient's discontinuation of the benzodiazepines. There may be an underlying primary psychiatric disorder. Pt reports recent memory problems which may be related to an undiagnosed neurocognitive disorder, early dementia secondary to hx alcohol use, or due to poor concentration from anxiety or benzodiazepine withdrawal. Patient is a 66 yo female, domiciled with  and son, retired, , with PPHx of depression and anxiety, PMH of COPD, PUD, lumbar spinal stenosis s/p laminectomy, history of alcohol and benzodiazepine abuse, on Valium 10 mg for 20+ years, who presented to the ED for vaginal/rectal bleeding. Psych consulted for reported symptoms of psychosis including hallucinations and paranoia, however these symptoms started prior to the Valium taper and has persisted despite patient's discontinuation of the benzodiazepines. There may be an underlying primary psychiatric disorder. Pt reports recent memory problems which may be related to an undiagnosed neurocognitive disorder, early dementia secondary to hx alcohol use, or due to poor concentration from anxiety or benzodiazepine withdrawal. I agree with starting a low dose of Zyprexa 5mg po qhs and restarting Valium 5mg po BID

## 2021-03-22 NOTE — BEHAVIORAL HEALTH ASSESSMENT NOTE - SUMMARY
Patient is a 64 yo female, domiciled with  and son, retired, , with PPHx of depression and anxiety, PMH of COPD, PUD, lumbar spinal stenosis s/p laminectomy, history of alcohol and benzodiazepine abuse, on Valium 10 mg for 20+ years, who presented to the ED for vaginal/rectal bleeding. Psych consulted for reported symptoms of psychosis including hallucinations and paranoia.     Paranoid delusion and hallucinations started prior to Valium taper. There may be an underlying primary psychiatric disorder. Pt reports recent memory problems which may be related to an undiagnosed neurocognitive disorder or due to poor concentration from anxiety or benzodiazepine withdrawal. Patient is a 66 yo female, domiciled with  and son, retired, , with PPHx of depression and anxiety, PMH of COPD, PUD, lumbar spinal stenosis s/p laminectomy, history of alcohol and benzodiazepine abuse, on Valium 10 mg for 20+ years, who presented to the ED for vaginal/rectal bleeding. Psych consulted for reported symptoms of psychosis including hallucinations and paranoia.     Paranoid delusion and hallucinations started prior to Valium taper. There may be an underlying primary psychiatric disorder. Pt reports recent memory problems which may be related to an undiagnosed neurocognitive disorder, early dementia secondary to hx alcohol use or due to poor concentration from anxiety or benzodiazepine withdrawal. Patient is a 66 yo female, domiciled with  and son, retired, , with PPHx of depression and anxiety, PMH of COPD, PUD, lumbar spinal stenosis s/p laminectomy, history of alcohol and benzodiazepine abuse, on Valium 10 mg for 20+ years, who presented to the ED for vaginal/rectal bleeding. Psych consulted for reported symptoms of psychosis including hallucinations and paranoia.     Paranoid delusion and hallucinations started prior to Valium taper. There may be an underlying primary psychiatric disorder. Pt reports recent memory problems which may be related to an undiagnosed neurocognitive disorder, early dementia secondary to hx alcohol use or due to poor concentration from anxiety or benzodiazepine withdrawal.  Patient may benefit from starting an antipsychotic, Zyprexa  for her paranoia and her disorganized thoughts.  She may also benefit from restarting Valium 5mg po BID since she has a history of continuing the Valium at home in the past few weeks. Patient is a 66 yo female, domiciled with  and son, retired, , with PPHx of depression and anxiety, PMH of COPD, PUD, lumbar spinal stenosis s/p laminectomy, history of alcohol and benzodiazepine abuse, on Valium 10 mg for 20+ years, who presented to the ED for vaginal/rectal bleeding. Psych consulted for reported symptoms of psychosis including hallucinations and paranoia.     Paranoid delusion and hallucinations started prior to Valium taper. There may be an underlying primary psychiatric disorder. Pt reports recent memory problems which may be related to an undiagnosed neurocognitive disorder, early dementia secondary to hx alcohol use or due to poor concentration from anxiety or benzodiazepine withdrawal.  Patient may benefit from starting an antipsychotic, Zyprexa  for her paranoia and her disorganized thoughts.  She may also benefit from restarting Valium 5mg po BID since she has a history of continuing the Valium at home in the past few weeks.  Pt currently lacks capacity to participate in her medical decisions, however she continues to have capacity to designate a proxy and states that she would like her son and  to help her with her medical decisions, especially regarding CT scans with contrast.

## 2021-03-22 NOTE — BEHAVIORAL HEALTH ASSESSMENT NOTE - NSBHCONSULTFOLLOWAFTERCARE_PSY_A_CORE FT
Pt may be referred to [facility] services [phone] or walk in [facility] [phone]

## 2021-03-22 NOTE — PROGRESS NOTE ADULT - PROBLEM SELECTOR PLAN 4
- DVT ppx: SCDs in setting of bleeding  - Diet: Regular diet  - PT eval - not on home O2  - PRN Duoneb

## 2021-03-22 NOTE — PROGRESS NOTE ADULT - PROBLEM SELECTOR PLAN 3
- per pt she is not on medications at home  - on last admission pt was on Remeron 7.5 mg QHS and Valium 2mg TID for insomnia w/ PRN ativan 1mg q6 for severe anxiety  - will hold off for now as pt not endorsing insomnia or anxiety - GYN consult - per them, likely not vaginal bleed  - maintain active type and screen, transfuse if Hbg < 7 or if hemodynamically stable in the setting of bleeding  - f/u CT abd/pelvis w/ IV contrast to r/o mass  - transvaginal ultrasound

## 2021-03-22 NOTE — BEHAVIORAL HEALTH ASSESSMENT NOTE - NSBHSOCIALHXDETAILSFT_PSY_A_CORE
Lives at home with  and 30 yo son. Used to work as a  for 18 years, now retired. Also used to work as  at [employer]. Per son, patient was heavy cocaine user at this time. Her fiance  in a motorcycle accident in her 20s and she was hospitalized following a suicide attempt at that time. Reports being very Restorationist. Per son, patient has had decreased appetite and lost 40+ lbs over the past 1-2 months. Has also been sleeping 16+ hours ever since being discharged in February. Lives at home with  and 28 yo son. Used to work as a  for 18 years, now retired. Also used to work as  at [employer]. Per son, patient was heavy cocaine user at that time. Her fiance  in a motorcycle accident in her 20s and she was hospitalized following a suicide attempt at that time. Reports being very Advent. Per son, patient has had decreased appetite and lost 40+ lbs over the past 1-2 months. She has also been sleeping 16+ hours ever since being discharged in February. He denies that she currently uses any substances.

## 2021-03-22 NOTE — PROGRESS NOTE ADULT - ATTENDING COMMENTS
Vaginal vs GI bleeding- TVUS and CT A/P pending   Reported unintentional weight loss  Hallucinations/concern for benzo withdrawal- Psych/neuro evals appreciated  Resume Valium 5 mg PO BID   Patient refused IV contrast on prior CT attempt- will reattempt after resuming Valium  Further imaging if tolerates CT as per neuro recs

## 2021-03-22 NOTE — PROGRESS NOTE ADULT - PROBLEM SELECTOR PLAN 5
Transitions of Care Status:  1.  Name of PCP:  2.  PCP Contacted on Admission: [ ] Y    [ ] N    3.  PCP contacted at Discharge: [ ] Y    [ ] N    [ ] N/A  4.  Post-Discharge Appointment Date and Location:  5.  Summary of Handoff given to PCP: - per pt she is not on medications at home  - on last admission pt was on Remeron 7.5 mg QHS and Valium 2mg TID for insomnia w/ PRN ativan 1mg q6 for severe anxiety  - Valium 5 mg BID for now  - can add Remeron as needed

## 2021-03-23 DIAGNOSIS — F29 UNSPECIFIED PSYCHOSIS NOT DUE TO A SUBSTANCE OR KNOWN PHYSIOLOGICAL CONDITION: ICD-10-CM

## 2021-03-23 LAB
ALBUMIN SERPL ELPH-MCNC: 2.9 G/DL — LOW (ref 3.3–5)
ALP SERPL-CCNC: 48 U/L — SIGNIFICANT CHANGE UP (ref 40–120)
ALT FLD-CCNC: 18 U/L — SIGNIFICANT CHANGE UP (ref 10–45)
ANION GAP SERPL CALC-SCNC: 9 MMOL/L — SIGNIFICANT CHANGE UP (ref 5–17)
APPEARANCE UR: CLEAR — SIGNIFICANT CHANGE UP
APTT BLD: 26.9 SEC — LOW (ref 27.5–35.5)
AST SERPL-CCNC: 18 U/L — SIGNIFICANT CHANGE UP (ref 10–40)
BACTERIA # UR AUTO: NEGATIVE — SIGNIFICANT CHANGE UP
BASOPHILS # BLD AUTO: 0 K/UL — SIGNIFICANT CHANGE UP (ref 0–0.2)
BASOPHILS NFR BLD AUTO: 0 % — SIGNIFICANT CHANGE UP (ref 0–2)
BILIRUB SERPL-MCNC: 0.2 MG/DL — SIGNIFICANT CHANGE UP (ref 0.2–1.2)
BILIRUB UR-MCNC: NEGATIVE — SIGNIFICANT CHANGE UP
BLD GP AB SCN SERPL QL: NEGATIVE — SIGNIFICANT CHANGE UP
BUN SERPL-MCNC: 10 MG/DL — SIGNIFICANT CHANGE UP (ref 7–23)
CALCIUM SERPL-MCNC: 8.8 MG/DL — SIGNIFICANT CHANGE UP (ref 8.4–10.5)
CHLORIDE SERPL-SCNC: 109 MMOL/L — HIGH (ref 96–108)
CO2 SERPL-SCNC: 24 MMOL/L — SIGNIFICANT CHANGE UP (ref 22–31)
COLOR SPEC: SIGNIFICANT CHANGE UP
CREAT SERPL-MCNC: 0.75 MG/DL — SIGNIFICANT CHANGE UP (ref 0.5–1.3)
CRP SERPL-MCNC: 1.05 MG/DL — HIGH (ref 0–0.4)
DIFF PNL FLD: ABNORMAL
EOSINOPHIL # BLD AUTO: 0.2 K/UL — SIGNIFICANT CHANGE UP (ref 0–0.5)
EOSINOPHIL NFR BLD AUTO: 3.5 % — SIGNIFICANT CHANGE UP (ref 0–6)
EPI CELLS # UR: 1 /HPF — SIGNIFICANT CHANGE UP
ERYTHROCYTE [SEDIMENTATION RATE] IN BLOOD: 25 MM/HR — HIGH (ref 0–20)
FERRITIN SERPL-MCNC: 176 NG/ML — HIGH (ref 15–150)
GLUCOSE SERPL-MCNC: 90 MG/DL — SIGNIFICANT CHANGE UP (ref 70–99)
GLUCOSE UR QL: NEGATIVE — SIGNIFICANT CHANGE UP
HCT VFR BLD CALC: 30.2 % — LOW (ref 34.5–45)
HGB BLD-MCNC: 9.7 G/DL — LOW (ref 11.5–15.5)
HYALINE CASTS # UR AUTO: 2 /LPF — SIGNIFICANT CHANGE UP (ref 0–2)
INR BLD: 0.99 RATIO — SIGNIFICANT CHANGE UP (ref 0.88–1.16)
IRON SATN MFR SERPL: 31 % — SIGNIFICANT CHANGE UP (ref 14–50)
IRON SATN MFR SERPL: 58 UG/DL — SIGNIFICANT CHANGE UP (ref 30–160)
KETONES UR-MCNC: NEGATIVE — SIGNIFICANT CHANGE UP
LEUKOCYTE ESTERASE UR-ACNC: NEGATIVE — SIGNIFICANT CHANGE UP
LYMPHOCYTES # BLD AUTO: 2.88 K/UL — SIGNIFICANT CHANGE UP (ref 1–3.3)
LYMPHOCYTES # BLD AUTO: 50.5 % — HIGH (ref 13–44)
MAGNESIUM SERPL-MCNC: 1.8 MG/DL — SIGNIFICANT CHANGE UP (ref 1.6–2.6)
MANUAL SMEAR VERIFICATION: SIGNIFICANT CHANGE UP
MCHC RBC-ENTMCNC: 30.3 PG — SIGNIFICANT CHANGE UP (ref 27–34)
MCHC RBC-ENTMCNC: 32.1 GM/DL — SIGNIFICANT CHANGE UP (ref 32–36)
MCV RBC AUTO: 94.4 FL — SIGNIFICANT CHANGE UP (ref 80–100)
MONOCYTES # BLD AUTO: 0.3 K/UL — SIGNIFICANT CHANGE UP (ref 0–0.9)
MONOCYTES NFR BLD AUTO: 5.3 % — SIGNIFICANT CHANGE UP (ref 2–14)
MYELOCYTES NFR BLD: 0.9 % — HIGH (ref 0–0)
NEUTROPHILS # BLD AUTO: 2.27 K/UL — SIGNIFICANT CHANGE UP (ref 1.8–7.4)
NEUTROPHILS NFR BLD AUTO: 39.8 % — LOW (ref 43–77)
NITRITE UR-MCNC: NEGATIVE — SIGNIFICANT CHANGE UP
PH UR: 7 — SIGNIFICANT CHANGE UP (ref 5–8)
PHOSPHATE SERPL-MCNC: 2.9 MG/DL — SIGNIFICANT CHANGE UP (ref 2.5–4.5)
PLAT MORPH BLD: NORMAL — SIGNIFICANT CHANGE UP
PLATELET # BLD AUTO: 370 K/UL — SIGNIFICANT CHANGE UP (ref 150–400)
POTASSIUM SERPL-MCNC: 3.5 MMOL/L — SIGNIFICANT CHANGE UP (ref 3.5–5.3)
POTASSIUM SERPL-SCNC: 3.5 MMOL/L — SIGNIFICANT CHANGE UP (ref 3.5–5.3)
PROT SERPL-MCNC: 5.6 G/DL — LOW (ref 6–8.3)
PROT UR-MCNC: NEGATIVE — SIGNIFICANT CHANGE UP
PROTHROM AB SERPL-ACNC: 11.7 SEC — SIGNIFICANT CHANGE UP (ref 10.6–13.6)
RBC # BLD: 3.2 M/UL — LOW (ref 3.8–5.2)
RBC # FLD: 12.8 % — SIGNIFICANT CHANGE UP (ref 10.3–14.5)
RBC BLD AUTO: SIGNIFICANT CHANGE UP
RBC CASTS # UR COMP ASSIST: 5 /HPF — HIGH (ref 0–4)
RH IG SCN BLD-IMP: POSITIVE — SIGNIFICANT CHANGE UP
SODIUM SERPL-SCNC: 142 MMOL/L — SIGNIFICANT CHANGE UP (ref 135–145)
SP GR SPEC: 1.02 — SIGNIFICANT CHANGE UP (ref 1.01–1.02)
TIBC SERPL-MCNC: 184 UG/DL — LOW (ref 220–430)
UIBC SERPL-MCNC: 126 UG/DL — SIGNIFICANT CHANGE UP (ref 110–370)
UROBILINOGEN FLD QL: NEGATIVE — SIGNIFICANT CHANGE UP
WBC # BLD: 5.71 K/UL — SIGNIFICANT CHANGE UP (ref 3.8–10.5)
WBC # FLD AUTO: 5.71 K/UL — SIGNIFICANT CHANGE UP (ref 3.8–10.5)
WBC UR QL: 2 /HPF — SIGNIFICANT CHANGE UP (ref 0–5)

## 2021-03-23 PROCEDURE — 99232 SBSQ HOSP IP/OBS MODERATE 35: CPT

## 2021-03-23 PROCEDURE — 76830 TRANSVAGINAL US NON-OB: CPT | Mod: 26

## 2021-03-23 PROCEDURE — 99223 1ST HOSP IP/OBS HIGH 75: CPT | Mod: GC

## 2021-03-23 PROCEDURE — 93010 ELECTROCARDIOGRAM REPORT: CPT

## 2021-03-23 PROCEDURE — 99233 SBSQ HOSP IP/OBS HIGH 50: CPT | Mod: GC

## 2021-03-23 PROCEDURE — 74177 CT ABD & PELVIS W/CONTRAST: CPT | Mod: 26

## 2021-03-23 PROCEDURE — 95816 EEG AWAKE AND DROWSY: CPT | Mod: 26

## 2021-03-23 RX ADMIN — Medication 105 MILLIGRAM(S): at 13:50

## 2021-03-23 RX ADMIN — Medication 1 MILLIGRAM(S): at 13:49

## 2021-03-23 RX ADMIN — Medication 1 TABLET(S): at 13:50

## 2021-03-23 RX ADMIN — PREGABALIN 1000 MICROGRAM(S): 225 CAPSULE ORAL at 13:50

## 2021-03-23 RX ADMIN — Medication 5 MILLIGRAM(S): at 09:46

## 2021-03-23 RX ADMIN — Medication 105 MILLIGRAM(S): at 22:02

## 2021-03-23 RX ADMIN — OLANZAPINE 5 MILLIGRAM(S): 15 TABLET, FILM COATED ORAL at 22:02

## 2021-03-23 RX ADMIN — Medication 5 MILLIGRAM(S): at 17:46

## 2021-03-23 RX ADMIN — Medication 105 MILLIGRAM(S): at 06:31

## 2021-03-23 NOTE — EEG REPORT - NS EEG TEXT BOX
[facility]   COMPREHENSIVE EPILEPSY CENTER   REPORT OF ROUTINE VIDEO EEG     [facility]: [address] [place] [address] [address], [place] [address], Ph#: [phone]  [facility]: [id number], [place] [address], Ph#: [phone]  [facility]: [address] [place] [address], Ph#: [phone]    Patient Name: JUWAN OBANDO  Age and : 65y ([birth year])  MRN #: [id number]  Location: [address]  Referring Physician: Pavel Duran    Study Date: 21    _____________________________________________________________  TECHNICAL INFORMATION    Placement and Labeling of Electrodes:  The EEG was performed utilizing 20 channels referential EEG connections (coronal over temporal over parasagittal montage) using all standard 10-20 electrode placements with EKG.  Recording was at a sampling rate of 256 samples per second per channel.  Time synchronized digital video recording was done simultaneously with EEG recording.  A low light infrared camera was used for low light recording.  Maycol and seizure detection algorithms were utilized.    _____________________________________________________________  HISTORY    Patient is a 65y old  Female who presents with a chief complaint of post menopausal bleeding (23 Mar 2021 17:05)      PERTINENT MEDICATION:  diazepam    Tablet 5 milliGRAM(s) Oral two times a day    _____________________________________________________________  STUDY INTERPRETATION    Findings: The background was continuous, spontaneously variable and reactive. During wakefulness, the posterior dominant rhythm consisted of symmetric, well-modulated 7-7.5 Hz activity, with amplitude to 30 uV, that attenuated to eye opening.      Background Slowing:  Background predominantly consisted of theta, delta and faster activities.    Focal Slowing:   None were present.      Sleep Background:  Drowsiness was characterized by fragmentation, attenuation, and slowing of the background activity.    Stage II sleep transients were not recorded.    Other Non-Epileptiform Findings:  None were present.    Interictal Epileptiform Activity:   None were present.    Events:  Clinical events: None recorded.  Seizures: None recorded.    Activation Procedures:   Hyperventilation was not performed.    Photic stimulation was not performed.     Artifacts:  Intermittent myogenic and movement artifacts were noted.    ECG:  The heart rate on single channel ECG was predominantly between 55-65 BPM.    _____________________________________________________________  EEG SUMMARY/CLASSIFICATION     Abnormal EEG in the awake, drowsy states.  - Mild generalized slowing.    _____________________________________________________________  EEG IMPRESSION/CLINICAL CORRELATE    Abnormal EEG study.  1. mild nonspecific diffuse or multifocal cerebral dysfunction.   2. No epileptiform pattern or seizure seen.    Hira Garcia MD PGY-5  Epilepsy Fellow    This Preliminary report is based on fellow review. Final report pending attending review.    Reading Room: [phone]  On Call Service After Hours: [phone] [facility]   COMPREHENSIVE EPILEPSY CENTER   REPORT OF ROUTINE VIDEO EEG     [facility]: [address] [place] [address] [address], [place] [address], Ph#: [phone]  [facility]: [id number], [place] [address], Ph#: [phone]  [facility]: [address] [place] [address], Ph#: [phone]    Patient Name: JUWAN OBANDO  Age and : 65y (57)  MRN #: [id number]  Location: [address]  Referring Physician: Pavel Duran    Study Date: 21    _____________________________________________________________  TECHNICAL INFORMATION    Placement and Labeling of Electrodes:  The EEG was performed utilizing 20 channels referential EEG connections (coronal over temporal over parasagittal montage) using all standard 10-20 electrode placements with EKG.  Recording was at a sampling rate of 256 samples per second per channel.  Time synchronized digital video recording was done simultaneously with EEG recording.  A low light infrared camera was used for low light recording.  Maycol and seizure detection algorithms were utilized.    _____________________________________________________________  HISTORY    Patient is a 65y old  Female who presents with a chief complaint of post menopausal bleeding (23 Mar 2021 17:05)      PERTINENT MEDICATION:  diazepam    Tablet 5 milliGRAM(s) Oral two times a day    _____________________________________________________________  STUDY INTERPRETATION    Findings: The background was continuous, spontaneously variable and reactive. During wakefulness, the posterior dominant rhythm consisted of symmetric, well-modulated 7-7.5 Hz activity, with amplitude to 30 uV, that attenuated to eye opening.      Background Slowing:  Background predominantly consisted of theta, delta and faster activities.    Focal Slowing:   None were present.      Sleep Background:  Drowsiness was characterized by fragmentation, attenuation, and slowing of the background activity.    Stage II sleep transients were not recorded.    Other Non-Epileptiform Findings:  None were present.    Interictal Epileptiform Activity:   None were present.    Events:  Clinical events: None recorded.  Seizures: None recorded.    Activation Procedures:   Hyperventilation was not performed.    Photic stimulation was not performed.     Artifacts:  Intermittent myogenic and movement artifacts were noted.    ECG:  The heart rate on single channel ECG was predominantly between 55-65 BPM.    _____________________________________________________________  EEG SUMMARY/CLASSIFICATION     Abnormal EEG in the awake, drowsy states.  - Mild generalized slowing.    _____________________________________________________________  EEG IMPRESSION/CLINICAL CORRELATE    Abnormal EEG study.  1. mild nonspecific diffuse or multifocal cerebral dysfunction.   2. No epileptiform pattern or seizure seen.    Hira Garcia MD PGY-5  Epilepsy Fellow    Pawel Carbajal MD PhD  Director, Epilepsy Division, [facility] EEG Reading Room Ph#: [phone]  Epilepsy Answering Service after 5PM and before 8:30AM: Ph#: [phone]

## 2021-03-23 NOTE — CONSULT NOTE ADULT - ATTENDING COMMENTS
Patient seen and examined, agree with above. Patient currently hallucinating, unable to cooperate with exam. She will not be able to tolerate bowel prep for endoscopic evaluation. Would consider CT A/P if patient is agreeable. Will re-evaluate for endoscopic evaluation if patient is more lucid, or if there are active signs of bleeding. Patient seen and examined, agree with above. Patient currently hallucinating, unable to cooperate with exam. No active bleeding, iron studies not consistent with iron deficiency anemia. She will not be able to tolerate bowel prep for endoscopic evaluation. Would consider CT A/P if patient is agreeable. Will re-evaluate for endoscopic evaluation if patient is more lucid, or if there are active signs of bleeding.

## 2021-03-23 NOTE — CONSULT NOTE ADULT - REASON FOR ADMISSION
post menopausal bleeding
post menopausal bleeding
Reported vaginal bleeding
post menopausal bleeding

## 2021-03-23 NOTE — DIETITIAN INITIAL EVALUATION ADULT. - ADD RECOMMEND
Continue micronutrient supplementation as ordered. Provide encouragement with PO intake, menu selections, and assistance with meals as needed. Continue to monitor PO intake, labs, weights, BM, skin, clinical course.

## 2021-03-23 NOTE — PROGRESS NOTE ADULT - PROBLEM SELECTOR PLAN 2
- unclear bleeding source   - need CT with contrast but patient has refused multiple times  - psych eval appreciated  - TVUS pending but unclear benefit  - check FOBT  - hold of VTE ppx i/s/o possible bleed

## 2021-03-23 NOTE — PROGRESS NOTE ADULT - ASSESSMENT
66 yo F w/ PMHx of COPD (not on home O2 or BIPAP), gastric and duodenal ulcers (H. pylori negative), lumbar spinal stenosis s/p laminectomy, anxiety, alcohol and benzodiazepine abuse w/ recent admission 02/10 - 02/16 for benzodiazepine withdrawal who is presenting for post-menopausal bleeding.  64 yo F w/ PMHx of COPD (not on home O2 or BIPAP), gastric and duodenal ulcers (H. pylori negative), lumbar spinal stenosis s/p laminectomy, anxiety, alcohol and benzodiazepine abuse w/ recent admission 02/10 - 02/16 for benzodiazepine withdrawal who is presenting for suspected GI/ bleeding of unspecified origin.

## 2021-03-23 NOTE — DIETITIAN INITIAL EVALUATION ADULT. - OTHER INFO
No documented N/V, last BM 3/22. Per flow sheets pt with fair PO intake documented (50-75%). Breakfast tray observed at bedside 40% consumed, some of Ensure supplement consumed. Pt unsure of UBW but reports significant wt loss x 1-2 months, states she used to weigh 130-135lbs. Pt with dosing weight of 95lbs indicating 35lb wt loss x 1-2 mo.     No documented N/V, last BM 3/22. Per flow sheets pt with fair PO intake documented (50-75%). Breakfast tray observed at bedside 50% consumed, some of Ensure supplement consumed. PO encouragement provided with emphasis on protein and increase calorie intake.

## 2021-03-23 NOTE — DIETITIAN INITIAL EVALUATION ADULT. - PERTINENT LABORATORY DATA
03-23 @ 06:59: Na 142, BUN 10, Cr 0.75, BG 90, K+ 3.5, Phos 2.9, Mg 1.8, Alk Phos 48, ALT/SGPT 18, AST/SGOT 18, HbA1c --  03-22 @ 19:40: Na 137, BUN 12, Cr 0.78, BG 88, K+ 3.6, Phos --, Mg --, Alk Phos --, ALT/SGPT --, AST/SGOT --, HbA1c --

## 2021-03-23 NOTE — PROGRESS NOTE ADULT - PROBLEM SELECTOR PLAN 3
- GYN consult - per them, likely not vaginal bleed  - maintain active type and screen, transfuse if Hbg < 7 or if hemodynamically stable in the setting of bleeding  - f/u CT abd/pelvis w/ IV contrast to r/o mass  - transvaginal ultrasound - GYN consult - per them, likely not vaginal bleed  - maintain active type and screen, transfuse if Hbg < 7 or if hemodynamically stable in the setting of bleeding  - coordinated with nurse manager to have son present for CT abd/pelvis w/ IV contrast to r/o mass  - transvaginal ultrasound

## 2021-03-23 NOTE — DIETITIAN INITIAL EVALUATION ADULT. - REASON INDICATOR FOR ASSESSMENT
Consult received for assessment/education. Assessment additionally warranted for BMI<19.  Information obtained from Consult received for assessment/education. Assessment additionally warranted for BMI<19.  Information obtained from pt, team, RN, EMR

## 2021-03-23 NOTE — CONSULT NOTE ADULT - ASSESSMENT
Impression:    66 yo F w/ hx of COPD (not on home oxygen), prior reported history of gastric and duodenal ulcers (H. pylori negative), lumbar spinal stenosis s/p laminectomy, anxiety, EtOH and benzodiazepine dependence w/ recent admission 02/10 - 02/16 for benzodiazepine withdrawal who presented with report of vaginal bleeding, found to have AMS and with hallucinations, and with anemia.    # Normocytic anemia with report of vaginal bleeding: Currently with no overt GI bleeding, HD stable, and with downtrending Hgb from 13 in 2/2021 to ~ 10 currently. No evidence of GI bleeding at this time. Iron studies most consistent with anemia of chronic inflammation. UA notable for large RBC. Patient reportedly declining CT scans.    Recommendations:  - Work-up of hematuria per primary team  - No plan for endoscopic evaluation at this time given no overt GI bleeding and in setting of AMS  - Can re-consider endoscopic evaluation if patient's mental status improves or develops overt GI bleeding  - Monitor CBCs daily  - Monitor bowel movements  - Transfuse for goal Hgb >/= 7.0  - Maintain active type and screen  - Rest of care per primary team    Ashish Covington MD  Gastroenterology Fellow  Please contact via [employer] Teams    Please call GI ([phone]) or e-mail [email or web address] if there are any additional questions or concerns, during weekdays from 8 am to 5 pm.     Please call answering service for on-call GI fellow ([phone]) after 5pm and before 8am, and on weekends.   Impression:    66 yo F w/ hx of COPD (not on home oxygen), prior reported history of gastric and duodenal ulcers (H. pylori negative), lumbar spinal stenosis s/p laminectomy, anxiety, EtOH and benzodiazepine dependence w/ recent admission 02/10 - 02/16 for benzodiazepine withdrawal who presented with report of vaginal bleeding, found to have AMS and with hallucinations, and with anemia.    # Normocytic anemia with report of vaginal bleeding: Currently with no overt GI bleeding, HD stable, and with downtrending Hgb from 13 in 2/2021 to ~ 10 currently. No evidence of GI bleeding at this time. Iron studies most consistent with anemia of chronic inflammation. UA notable for large RBC. Patient reportedly declining CT scans.    Recommendations:  - Work-up of hematuria per primary team  - No plan for endoscopic evaluation at this time given no overt GI bleeding and in setting of AMS, she will not agree to drink bowel prep/go for endoscopic evaluation  -Would consider non-contrast CT to rule out extraluminal bleeding, as there is no overt bleeding clinically  - Can re-consider endoscopic evaluation if patient's mental status improves or develops overt GI bleeding  - Monitor CBCs daily  - Monitor bowel movements  - PPI empirically once daily  - Transfuse for goal Hgb >/= 7.0  - Maintain active type and screen  - Rest of care per primary team    Ashish Covington MD  Gastroenterology Fellow  Please contact via Microsoft Teams    Please call GI ([phone]) or e-mail [email or web address] if there are any additional questions or concerns, during weekdays from 8 am to 5 pm.     Please call answering service for on-call GI fellow ([phone]) after 5pm and before 8am, and on weekends.

## 2021-03-23 NOTE — DIETITIAN INITIAL EVALUATION ADULT. - PERTINENT MEDS FT
MEDICATIONS  (STANDING):  cyanocobalamin 1000 MICROGram(s) Oral daily  diazepam    Tablet 5 milliGRAM(s) Oral two times a day  folic acid 1 milliGRAM(s) Oral daily  multivitamin 1 Tablet(s) Oral daily  OLANZapine 5 milliGRAM(s) Oral at bedtime  thiamine IVPB 500 milliGRAM(s) IV Intermittent every 8 hours    MEDICATIONS  (PRN):  albuterol/ipratropium for Nebulization 3 milliLiter(s) Nebulizer every 6 hours PRN Shortness of Breath and/or Wheezing

## 2021-03-23 NOTE — PROGRESS NOTE ADULT - ATTENDING COMMENTS
Bleeding- vaginal vs GI- CT A/P with IV contrast today  Reports unintentional weight loss- GI eval pending  Hallucinations/concern for benzo withdrawal- Psych/neuro evals appreciated- improved today  Cont Valium 5 mg PO BID

## 2021-03-23 NOTE — CONSULT NOTE ADULT - ASSESSMENT
66 yo female without past  hx with reports of gross hematuria, but none available for observation at this time. Undergoing workup for anemia and unintentional wt loss.    awaiting CT imaging - ideally obtain CT Urogram protocol ( ct a/p with and without contrast with delayed images) to visualize urinary tract for filling defects   obtain bladder scan to ensure complete emptying post void   when pt has an episode of hematuria in her diaper would obtain straight cath specimen to send for analysis and will ensure that bladder is the source of the bleeding   will need outpt urodynamics for etio of incontinence and outpt cysto to complete the gross hematuria workup    66 yo female without past  hx with reports of gross hematuria, but none available for observation at this time. Undergoing workup for anemia and unintentional wt loss.    - Awaiting CT imaging - ideally obtain CT Urogram protocol ( ct a/p with and without contrast with delayed images) to visualize urinary tract for filling defects   - Rec bladder scan for PVR  - May obtain straight cath specimen when blood is present to establish if bladder is source  - No further workup Urologic workup in house  - May follow up after discharge  - Please call with further questions

## 2021-03-23 NOTE — PROGRESS NOTE ADULT - SUBJECTIVE AND OBJECTIVE BOX
Da Ashutosh  MS4, Medicine  [phone]    PATIENT:  JUWAN OBANDO  MRN: [id number]    CHIEF COMPLAINT:  Patient is a 65y old  Female who presents with a chief complaint of post menopausal bleeding (21 Mar 2021 13:40)      INTERVAL HISTORY/OVERNIGHT EVENTS:  - overnight patient agitated, responding to internal stimuli, given ativan  - oriented to person, month, not year, not place  - states "her team is working on figuring out the trick behind those people down there"  - denies headache, cp, sob, dysuria  - seen ambulating this morning  - refused CT scan twice at this point despite multiple explanations as to its risks/benefits  - son this morning gave additional history, may have been using cocaine at home with  and was being given Valium prn at home for agitated behavior    MEDICATIONS:  MEDICATIONS  (STANDING):    MEDICATIONS  (PRN):  albuterol/ipratropium for Nebulization 3 milliLiter(s) Nebulizer every 6 hours PRN Shortness of Breath and/or Wheezing    ALLERGIES:  Allergies    Bananas (Vomiting; Diarrhea)  latex (Rash)  Levaquin (Other (Moderate))  Intolerances    OBJECTIVE:  ICU Vital Signs Last 24 Hrs  T(C): 36.9 (23 Mar 2021 05:15), Max: 36.9 (22 Mar 2021 13:49)  T(F): 98.4 (23 Mar 2021 05:15), Max: 98.4 (22 Mar 2021 13:49)  HR: 80 (23 Mar 2021 05:15) (69 - 80)  BP: 144/84 (23 Mar 2021 05:15) (117/80 - 144/84)  RR: 17 (23 Mar 2021 05:15) (17 - 18)  SpO2: 98% (23 Mar 2021 05:15) (98% - 99%)    I&O's Summary    22 Mar 2021 07:01  -  23 Mar 2021 07:00  --------------------------------------------------------  IN: 420 mL / OUT: 200 mL / NET: 220 mL    PHYSICAL EXAMINATION:  General: WN/WD NAD, frail appearing, cane at bedside  HEENT: PERRL, EOMI, moist mucous membranes  Neurology: A&O to person, month, not year or place, has delusions of conspiracy relating to CT scan, nonfocal, RAMOS x 4, ambulatory with minimal assistance  Respiratory: CTA B/L, normal respiratory effort, no wheezes, crackles, rales  CV: RRR, S1S2, no murmurs, rubs or gallops  Abdominal: Soft, NT, ND +BS  : no blood on pelvic digital exam, external genitalia wnl, no perineal anesthesia  Extremities: No edema, +peripheral pulses    LABS:                          11.1   6.43  )-----------( 387      ( 21 Mar 2021 06:37 )             33.7     03-21    140  |  102  |  8   ----------------------------<  98  3.1<L>   |  26  |  0.85    Ca    9.2      21 Mar 2021 06:37  Phos  2.5     03-21  Mg     1.8     -21    TPro  6.0  /  Alb  3.0<L>  /  TBili  0.4  /  DBili  x   /  AST  17  /  ALT  14  /  AlkPhos  51  03-20    LIVER FUNCTIONS - ( 20 Mar 2021 15:52 )  Alb: 3.0 g/dL / Pro: 6.0 g/dL / ALK PHOS: 51 U/L / ALT: 14 U/L / AST: 17 U/L / GGT: x           PT/INR - ( 20 Mar 2021 15:52 )   PT: 12.9 sec;   INR: 1.08 ratio         PTT - ( 20 Mar 2021 15:52 )  PTT:27.4 sec        Urinalysis Basic - ( 20 Mar 2021 21:40 )    Color: Light Orange / Appearance: Slightly Turbid / S.017 / pH: x  Gluc: x / Ketone: Negative  / Bili: Negative / Urobili: Negative   Blood: x / Protein: 30 mg/dL / Nitrite: Negative   Leuk Esterase: Negative / RBC: 1633 /hpf / WBC 8 /HPF   Sq Epi: x / Non Sq Epi: 0 /hpf / Bacteria: Negative        TELEMETRY:     EKG:     IMAGING:       Da Boykin  MS4, Medicine  [phone]    PATIENT:  JUWAN OBANDO  MRN: [id number]    CHIEF COMPLAINT:  Patient is a 65y old Female who presents with a chief complaint of post menopausal bleeding (21 Mar 2021 13:40)      INTERVAL HISTORY/OVERNIGHT EVENTS:  Patient endorsed sleeping well, ambulating. Denied auditory hallucinations, headache, dizziness, SOB, CP, abdominal pain.    MEDICATIONS  (STANDING):  cyanocobalamin 1000 MICROGram(s) Oral daily  diazepam    Tablet 5 milliGRAM(s) Oral two times a day  folic acid 1 milliGRAM(s) Oral daily  multivitamin 1 Tablet(s) Oral daily  OLANZapine 5 milliGRAM(s) Oral at bedtime  thiamine IVPB 500 milliGRAM(s) IV Intermittent every 8 hours    MEDICATIONS  (PRN):  albuterol/ipratropium for Nebulization 3 milliLiter(s) Nebulizer every 6 hours PRN Shortness of Breath and/or Wheezing    ALLERGIES:  Bananas (Vomiting; Diarrhea)  latex (Rash)  Levaquin (Other (Moderate))  Intolerances    OBJECTIVE:  ICU Vital Signs Last 24 Hrs  T(C): 36.9 (23 Mar 2021 05:15), Max: 36.9 (22 Mar 2021 13:49)  T(F): 98.4 (23 Mar 2021 05:15), Max: 98.4 (22 Mar 2021 13:49)  HR: 80 (23 Mar 2021 05:15) (69 - 80)  BP: 144/84 (23 Mar 2021 05:15) (117/80 - 144/84)  RR: 17 (23 Mar 2021 05:15) (17 - 18)  SpO2: 98% (23 Mar 2021 05:15) (98% - 99%)    I&O's Summary    22 Mar 2021 07:01  -  23 Mar 2021 07:00  --------------------------------------------------------  IN: 420 mL / OUT: 200 mL / NET: 220 mL    PHYSICAL EXAMINATION:  General: WN/WD NAD, frail appearing, cane at bedside  HEENT: PERRL, EOMI, moist mucous membranes  Neurology: A&O to person, month, not year or place, has delusions of conspiracy relating to CT scan, nonfocal, RAMOS x 4, ambulatory with minimal assistance  Respiratory: CTA B/L, normal respiratory effort, no wheezes, crackles, rales  CV: RRR, S1S2, no murmurs, rubs or gallops  Abdominal: Soft, NT, ND +BS  : no blood on pelvic digital exam, external genitalia wnl, no perineal anesthesia  Extremities: No edema, +peripheral pulses    LABS:                        9.7    5.71  )-----------( 370      ( 23 Mar 2021 07:09 )             30.2                       11.1   6.43  )-----------( 387      ( 21 Mar 2021 06:37 )             33.7     03-23    142  |  109<H>  |  10  ----------------------------<  90  3.5   |  24  |  0.75    Ca    8.8      23 Mar 2021 06:59  Phos  2.9     03-23  Mg     1.8     03-23    TPro  5.6<L>  /  Alb  2.9<L>  /  TBili  0.2  /  DBili  x   /  AST  18  /  ALT  18  /  AlkPhos  48  03-23    PT/INR - ( 23 Mar 2021 08:33 )   PT: 11.7 sec;   INR: 0.99 ratio       PTT - ( 23 Mar 2021 08:33 )  PTT:26.9 sec    Urinalysis Basic - ( 20 Mar 2021 21:40 )    Color: Light Orange / Appearance: Slightly Turbid / S.017 / pH: x  Gluc: x / Ketone: Negative  / Bili: Negative / Urobili: Negative   Blood: x / Protein: 30 mg/dL / Nitrite: Negative   Leuk Esterase: Negative / RBC: 1633 /hpf / WBC 8 /HPF   Sq Epi: x / Non Sq Epi: 0 /hpf / Bacteria: Negative    Utox was negative.    TELEMETRY:     EKG:     IMAGING:

## 2021-03-23 NOTE — PROGRESS NOTE ADULT - PROBLEM SELECTOR PLAN 5
- per pt she is not on medications at home  - on last admission pt was on Remeron 7.5 mg QHS and Valium 2mg TID for insomnia w/ PRN ativan 1mg q6 for severe anxiety  - Valium 5 mg BID for now  - can add Remeron as needed

## 2021-03-23 NOTE — CONSULT NOTE ADULT - SUBJECTIVE AND OBJECTIVE BOX
Chief Complaint: Vaginal bleeding    HPI:    64 yo F w/ hx of COPD (not on home oxygen), prior reported history of gastric and duodenal ulcers (H. pylori negative), lumbar spinal stenosis s/p laminectomy, anxiety, EtOH and benzodiazepine dependence w/ recent admission 02/10 - 02/16 for benzodiazepine withdrawal who presented with report of vaginal bleeding, found to have AMS and with hallucinations, and with anemia.    Unable to obtain history from patient as she was actively hallucinating at time of evaluation. She had no specific complaints.    Patient underwent gynecologic and rectal exam with Gynecology with no visualization of blood on either exam.    Patient underwent EGD/Colonoscopy in 2012 with findings only notable for gastritis and internal hemorrhoids. No ulcers were reported on this EGD.    Allergies:  Bananas (Vomiting; Diarrhea)  latex (Rash)  Levaquin (Other (Moderate))    Hospital Medications:  albuterol/ipratropium for Nebulization 3 milliLiter(s) Nebulizer every 6 hours PRN  cyanocobalamin 1000 MICROGram(s) Oral daily  diazepam    Tablet 5 milliGRAM(s) Oral two times a day  folic acid 1 milliGRAM(s) Oral daily  multivitamin 1 Tablet(s) Oral daily  OLANZapine 5 milliGRAM(s) Oral at bedtime  thiamine IVPB 500 milliGRAM(s) IV Intermittent every 8 hours    Allergies:   Bananas (Vomiting; Diarrhea)  latex (Rash)  Levaquin (Other (Moderate))    PMHX/PSHX:  Chronic midline back pain, unspecified back location    Anxiety    GERD (gastroesophageal reflux disease)    COPD (chronic obstructive pulmonary disease)    H/O laminectomy    FH: cholecystectomy    Family history:  FH: brain tumor    FH: breast cancer    Social History:     Tob: Denies  EtOH: Active use  Illicit Drugs: Benzodiazepine use, cocaine use    ROS: Unable to obtain    PHYSICAL EXAM:     Vital Signs:  Vital Signs Last 24 Hrs  T(C): 37.2 (23 Mar 2021 12:05), Max: 37.2 (23 Mar 2021 12:05)  T(F): 99 (23 Mar 2021 12:05), Max: 99 (23 Mar 2021 12:05)  HR: 72 (23 Mar 2021 12:05) (69 - 80)  BP: 138/82 (23 Mar 2021 12:05) (117/80 - 144/84)  BP(mean): --  RR: 18 (23 Mar 2021 12:05) (17 - 18)  SpO2: 98% (23 Mar 2021 12:05) (98% - 99%)  Daily     Daily     GENERAL:  No acute distress  HEENT:  Normocephalic/atraumatic, no scleral icterus  CHEST:  Normal effort, no accessory muscle use  ABDOMEN:  Soft, non-tender, non-distended  EXTREMITIES: No cyanosis, clubbing, or edema  RECTAL: Patient declined repeat rectal exam  SKIN:  No rash/erythema  NEURO:  Alert and oriented x 1 (self)    LABS:                        9.7    5.71  )-----------( 370      ( 23 Mar 2021 07:09 )             30.2     Mean Cell Volume: 94.4 fl (03-23-21 @ 07:09)    03-23    142  |  109<H>  |  10  ----------------------------<  90  3.5   |  24  |  0.75    Ca    8.8      23 Mar 2021 06:59  Phos  2.9     03-23  Mg     1.8     03-23    TPro  5.6<L>  /  Alb  2.9<L>  /  TBili  0.2  /  DBili  x   /  AST  18  /  ALT  18  /  AlkPhos  48  03-23    LIVER FUNCTIONS - ( 23 Mar 2021 06:59 )  Alb: 2.9 g/dL / Pro: 5.6 g/dL / ALK PHOS: 48 U/L / ALT: 18 U/L / AST: 18 U/L / GGT: x           PT/INR - ( 23 Mar 2021 08:33 )   PT: 11.7 sec;   INR: 0.99 ratio      PTT - ( 23 Mar 2021 08:33 )  PTT:26.9 sec               9.7    5.71  )-----------( 370      ( 23 Mar 2021 07:09 )             30.2                         10.4   6.09  )-----------( 374      ( 22 Mar 2021 09:03 )             32.9                         11.1   6.43  )-----------( 387      ( 21 Mar 2021 06:37 )             33.7                         10.9   7.43  )-----------( 351      ( 20 Mar 2021 15:52 )             33.6     Imaging:    Reviewed   Chief Complaint: Vaginal bleeding    HPI:    64 yo F w/ hx of COPD (not on home oxygen), prior reported history of gastric and duodenal ulcers (H. pylori negative), lumbar spinal stenosis s/p laminectomy, anxiety, EtOH and benzodiazepine dependence w/ recent admission 02/10 - 02/16 for benzodiazepine withdrawal who presented with report of vaginal bleeding, found to have AMS and with hallucinations, and with anemia.    Unable to obtain history from patient as she was actively hallucinating at time of evaluation. She had no specific complaints.    Patient underwent gynecologic and rectal exam with Gynecology with no visualization of blood on either exam.    Patient underwent EGD/Colonoscopy in 2012 with findings only notable for gastritis and internal hemorrhoids. No ulcers were reported on this EGD.    Allergies:  Bananas (Vomiting; Diarrhea)  latex (Rash)  Levaquin (Other (Moderate))    Hospital Medications:  albuterol/ipratropium for Nebulization 3 milliLiter(s) Nebulizer every 6 hours PRN  cyanocobalamin 1000 MICROGram(s) Oral daily  diazepam    Tablet 5 milliGRAM(s) Oral two times a day  folic acid 1 milliGRAM(s) Oral daily  multivitamin 1 Tablet(s) Oral daily  OLANZapine 5 milliGRAM(s) Oral at bedtime  thiamine IVPB 500 milliGRAM(s) IV Intermittent every 8 hours    Allergies:   Bananas (Vomiting; Diarrhea)  latex (Rash)  Levaquin (Other (Moderate))    PMHX/PSHX:  Chronic midline back pain, unspecified back location    Anxiety    GERD (gastroesophageal reflux disease)    COPD (chronic obstructive pulmonary disease)    H/O laminectomy    FH: cholecystectomy    Family history:  FH: brain tumor    FH: breast cancer    Social History:     Tob: Denies  EtOH: Active use  Illicit Drugs: Benzodiazepine use, cocaine use    ROS: Unable to obtain    PHYSICAL EXAM:     Vital Signs:  Vital Signs Last 24 Hrs  T(C): 37.2 (23 Mar 2021 12:05), Max: 37.2 (23 Mar 2021 12:05)  T(F): 99 (23 Mar 2021 12:05), Max: 99 (23 Mar 2021 12:05)  HR: 72 (23 Mar 2021 12:05) (69 - 80)  BP: 138/82 (23 Mar 2021 12:05) (117/80 - 144/84)  BP(mean): --  RR: 18 (23 Mar 2021 12:05) (17 - 18)  SpO2: 98% (23 Mar 2021 12:05) (98% - 99%)  Daily     Daily     GENERAL:  No acute distress  HEENT:  Normocephalic/atraumatic, no scleral icterus  CHEST:  Normal effort, no accessory muscle use  ABDOMEN:  Soft, non-tender, non-distended  EXTREMITIES: No cyanosis, clubbing, or edema  RECTAL: Patient declined repeat rectal exam  SKIN:  No rash/erythema  NEURO:  Alert and oriented x 1 (self), having visual hallucinations    LABS:                        9.7    5.71  )-----------( 370      ( 23 Mar 2021 07:09 )             30.2     Mean Cell Volume: 94.4 fl (03-23-21 @ 07:09)    03-23    142  |  109<H>  |  10  ----------------------------<  90  3.5   |  24  |  0.75    Ca    8.8      23 Mar 2021 06:59  Phos  2.9     03-23  Mg     1.8     03-23    TPro  5.6<L>  /  Alb  2.9<L>  /  TBili  0.2  /  DBili  x   /  AST  18  /  ALT  18  /  AlkPhos  48  03-23    LIVER FUNCTIONS - ( 23 Mar 2021 06:59 )  Alb: 2.9 g/dL / Pro: 5.6 g/dL / ALK PHOS: 48 U/L / ALT: 18 U/L / AST: 18 U/L / GGT: x           PT/INR - ( 23 Mar 2021 08:33 )   PT: 11.7 sec;   INR: 0.99 ratio      PTT - ( 23 Mar 2021 08:33 )  PTT:26.9 sec               9.7    5.71  )-----------( 370      ( 23 Mar 2021 07:09 )             30.2                         10.4   6.09  )-----------( 374      ( 22 Mar 2021 09:03 )             32.9                         11.1   6.43  )-----------( 387      ( 21 Mar 2021 06:37 )             33.7                         10.9   7.43  )-----------( 351      ( 20 Mar 2021 15:52 )             33.6     Imaging:    Reviewed

## 2021-03-23 NOTE — PROGRESS NOTE ADULT - PROBLEM SELECTOR PLAN 1
- pt acutely altered 3/21 in pm, has been ahving issues with paranoia and hallucinations at home for year prior to admission  - c/f cocaine use? vs benzo withdrawal vs paraneoplastic disease (40 lb weight loss)  - neuro - rec MRI - unclear if patient will be able to tolerate exam  - psychiatric recs appreciated  - utox  - B12/folate/thiamine levels ordered  - heavy metal screen  - TSH wnl - pt acutely altered 3/21 in pm, has been ahving issues with paranoia and hallucinations at home for year prior to admission  - c/f cocaine use? vs benzo withdrawal vs paraneoplastic disease (40 lb weight loss)  - neuro - rec MRI - unclear if patient will be able to tolerate exam  - psychiatric consult placed  - utox  - B12/folate/thiamine levels ordered  - heavy metal screen  - TSH wnl

## 2021-03-23 NOTE — DIETITIAN INITIAL EVALUATION ADULT. - PROBLEM SELECTOR PLAN 1
- GYN consult in AM   - maintain active type and screen, transfuse if Hbg < 7 or if hemodynamically stable in the setting of bleeding  - f/u CT abd/pelvis w/ IV contrast to r/o mass  - f/u TSH for weight loss, fatigue

## 2021-03-23 NOTE — DIETITIAN INITIAL EVALUATION ADULT. - PHYSCIAL ASSESSMENT
no noted pressure injuries as per documentation. underweight no noted pressure injuries as per documentation.  nutrition focused physical exam performed with pt permission.

## 2021-03-23 NOTE — CONSULT NOTE ADULT - SUBJECTIVE AND OBJECTIVE BOX
66 yo Female without past  hx here for workup of unintentional wt loss, anemia and reports bleeding with incontinent episodes. Seen by GYN and does not appear to be GYN source. States incontinence - urinary and fecal - began suddenly a few months ago. + vaginal deliveries but no hx of stress incontinence. Occ sensation of incomplete voiding. Reports that the last 2-4 weeks had intermittent gross hematuria at worse punch colored without clots. States today there isnt hematuria   No fevers/ chills/ flank pain/ dysuria/ hx uti/ changes in urinary freq/ hx stone dz    + past tobacco, retired , no family hx of  malignancy     Urine studies from Feb inc u/a x 2 without microscopic hematuria and cx was negative     PAST MEDICAL & SURGICAL HISTORY:  Chronic midline back pain, unspecified back location    Anxiety    GERD (gastroesophageal reflux disease)    COPD (chronic obstructive pulmonary disease)    H/O laminectomy    FH: cholecystectomy        MEDICATIONS  (STANDING):  cyanocobalamin 1000 MICROGram(s) Oral daily  diazepam    Tablet 5 milliGRAM(s) Oral two times a day  folic acid 1 milliGRAM(s) Oral daily  multivitamin 1 Tablet(s) Oral daily  OLANZapine 5 milliGRAM(s) Oral at bedtime  thiamine IVPB 500 milliGRAM(s) IV Intermittent every 8 hours    MEDICATIONS  (PRN):  albuterol/ipratropium for Nebulization 3 milliLiter(s) Nebulizer every 6 hours PRN Shortness of Breath and/or Wheezing      FAMILY HISTORY:  FH: brain tumor  maternal    FH: breast cancer  paternal sister        Allergies    Bananas (Vomiting; Diarrhea)  latex (Rash)  Levaquin (Other (Moderate))    Intolerances    SOCIAL HISTORY:  as above     REVIEW OF SYSTEMS: Otherwise negative as stated in HPI    Physical Exam  Vital signs  T(C): 37.2 (03-23-21 @ 12:05), Max: 37.2 (03-23-21 @ 12:05)  HR: 72 (03-23-21 @ 12:05)  BP: 138/82 (03-23-21 @ 12:05)  SpO2: 98% (03-23-21 @ 12:05)  Wt(kg): --    Output    UOP NONE AVAILABLE     Gen:  AWAKE ALERT NAD AXOX3    Pulm:  NO RESP DISTRESS  	  CV:  S1S2    GI:  FLAT SOFT NT/ND  NO CVAT BL   NO SUPRAPUBIC TENDERNESS     :  ATROPHIC INTROITUS  NO INCONTINENCE WITH VALSALVA                           	      LABS:                          9.7    5.71  )-----------( 370      ( 23 Mar 2021 07:09 )             30.2       03-23    142  |  109<H>  |  10  ----------------------------<  90  3.5   |  24  |  0.75    Ca    8.8      23 Mar 2021 06:59  Phos  2.9     03-23  Mg     1.8     03-23    TPro  5.6<L>  /  Alb  2.9<L>  /  TBili  0.2  /  DBili  x   /  AST  18  /  ALT  18  /  AlkPhos  48  03-23    PT/INR - ( 23 Mar 2021 08:33 )   PT: 11.7 sec;   INR: 0.99 ratio         PTT - ( 23 Mar 2021 08:33 )  PTT:26.9 sec      Urine Cx: Culture - Urine (03.21.21 @ 01:11)    Specimen Source: .Urine Clean Catch (Midstream)    Culture Results:   <10,000 CFU/mL Normal Urogenital Joellen      RADIOLOGY:    < from: US Transvaginal (03.23.21 @ 12:45) >  FINDINGS:    Uterus: 3.9 cm x 1.7 cm x 3.5 cm. Atrophic.  Endometrium: 0.1 cm. Within normal limits.    Right ovary: 2.9 cm x 1.6 cm x 1.7 cm. Heterogeneous with hyperechoic elements. On prior CT, calcification was demonstrated with areas of fat density. No internal vascularity.  Left ovary: 1.5 cm x 1.8 cm x 1.4 cm. Heterogeneous 1.2 x 1.3 x 1.3 cm  cystic mass with internal hyperechoic nodularity. On the prior CT, this lesion demonstrated areas of fat. No internal vascularity within the mass.    Fluid: None.    IMPRESSION:  Bilateral small ovarian lesions, likely dermoids. The right ovarian lesion is hyperechoic and demonstrated fat and calcification on prior CT. The left ovarian lesion has hyperechoic foci and demonstrated fat on prior CT.    Atrophic endometrium with trace of fluid.      < end of copied text >

## 2021-03-23 NOTE — DIETITIAN NUTRITION RISK NOTIFICATION - TREATMENT: THE FOLLOWING DIET HAS BEEN RECOMMENDED
Diet, Regular:   Supplement Feeding Modality:  Oral  Ensure Enlive Cans or Servings Per Day:  2       Frequency:  Two Times a day (03-22-21 @ 21:24) [Active]

## 2021-03-23 NOTE — DIETITIAN INITIAL EVALUATION ADULT. - ORAL INTAKE PTA/DIET HISTORY
PTA pt reports poor PO intake for last 1-2 months likely due to poor appetite, but pt unable to clearly state reason for poor intake, states she does not know why this happened to her. PTA consuming soup and crackers for lunch and a [employer] sandwich for dinner. Mostly consuming [place]. Endorses intake of vitamin D, vitamin B12 and garlic. Endorses food allergy to banana - rx: vomiting. No chewing/swallowing difficulty reported.

## 2021-03-24 LAB
ALBUMIN SERPL ELPH-MCNC: 2.9 G/DL — LOW (ref 3.3–5)
ALP SERPL-CCNC: 48 U/L — SIGNIFICANT CHANGE UP (ref 40–120)
ALT FLD-CCNC: 17 U/L — SIGNIFICANT CHANGE UP (ref 10–45)
ANION GAP SERPL CALC-SCNC: 11 MMOL/L — SIGNIFICANT CHANGE UP (ref 5–17)
APTT BLD: 27.9 SEC — SIGNIFICANT CHANGE UP (ref 27.5–35.5)
AST SERPL-CCNC: 15 U/L — SIGNIFICANT CHANGE UP (ref 10–40)
BASOPHILS # BLD AUTO: 0.06 K/UL — SIGNIFICANT CHANGE UP (ref 0–0.2)
BASOPHILS NFR BLD AUTO: 0.8 % — SIGNIFICANT CHANGE UP (ref 0–2)
BILIRUB SERPL-MCNC: 0.1 MG/DL — LOW (ref 0.2–1.2)
BUN SERPL-MCNC: 13 MG/DL — SIGNIFICANT CHANGE UP (ref 7–23)
CALCIUM SERPL-MCNC: 8.7 MG/DL — SIGNIFICANT CHANGE UP (ref 8.4–10.5)
CHLORIDE SERPL-SCNC: 104 MMOL/L — SIGNIFICANT CHANGE UP (ref 96–108)
CO2 SERPL-SCNC: 26 MMOL/L — SIGNIFICANT CHANGE UP (ref 22–31)
CREAT SERPL-MCNC: 0.76 MG/DL — SIGNIFICANT CHANGE UP (ref 0.5–1.3)
EOSINOPHIL # BLD AUTO: 0.13 K/UL — SIGNIFICANT CHANGE UP (ref 0–0.5)
EOSINOPHIL NFR BLD AUTO: 1.8 % — SIGNIFICANT CHANGE UP (ref 0–6)
GLUCOSE SERPL-MCNC: 100 MG/DL — HIGH (ref 70–99)
HCT VFR BLD CALC: 32.5 % — LOW (ref 34.5–45)
HGB BLD-MCNC: 10.2 G/DL — LOW (ref 11.5–15.5)
IMM GRANULOCYTES NFR BLD AUTO: 1.1 % — SIGNIFICANT CHANGE UP (ref 0–1.5)
INR BLD: 0.92 RATIO — SIGNIFICANT CHANGE UP (ref 0.88–1.16)
LYMPHOCYTES # BLD AUTO: 3.33 K/UL — HIGH (ref 1–3.3)
LYMPHOCYTES # BLD AUTO: 46.3 % — HIGH (ref 13–44)
MAGNESIUM SERPL-MCNC: 1.7 MG/DL — SIGNIFICANT CHANGE UP (ref 1.6–2.6)
MCHC RBC-ENTMCNC: 30 PG — SIGNIFICANT CHANGE UP (ref 27–34)
MCHC RBC-ENTMCNC: 31.4 GM/DL — LOW (ref 32–36)
MCV RBC AUTO: 95.6 FL — SIGNIFICANT CHANGE UP (ref 80–100)
MONOCYTES # BLD AUTO: 0.44 K/UL — SIGNIFICANT CHANGE UP (ref 0–0.9)
MONOCYTES NFR BLD AUTO: 6.1 % — SIGNIFICANT CHANGE UP (ref 2–14)
NEUTROPHILS # BLD AUTO: 3.16 K/UL — SIGNIFICANT CHANGE UP (ref 1.8–7.4)
NEUTROPHILS NFR BLD AUTO: 43.9 % — SIGNIFICANT CHANGE UP (ref 43–77)
NRBC # BLD: 0 /100 WBCS — SIGNIFICANT CHANGE UP (ref 0–0)
PHOSPHATE SERPL-MCNC: 3 MG/DL — SIGNIFICANT CHANGE UP (ref 2.5–4.5)
PLATELET # BLD AUTO: 432 K/UL — HIGH (ref 150–400)
POTASSIUM SERPL-MCNC: 3.2 MMOL/L — LOW (ref 3.5–5.3)
POTASSIUM SERPL-SCNC: 3.2 MMOL/L — LOW (ref 3.5–5.3)
PROT SERPL-MCNC: 5.6 G/DL — LOW (ref 6–8.3)
PROTHROM AB SERPL-ACNC: 11.1 SEC — SIGNIFICANT CHANGE UP (ref 10.6–13.6)
RBC # BLD: 3.4 M/UL — LOW (ref 3.8–5.2)
RBC # FLD: 13 % — SIGNIFICANT CHANGE UP (ref 10.3–14.5)
SODIUM SERPL-SCNC: 141 MMOL/L — SIGNIFICANT CHANGE UP (ref 135–145)
WBC # BLD: 7.2 K/UL — SIGNIFICANT CHANGE UP (ref 3.8–10.5)
WBC # FLD AUTO: 7.2 K/UL — SIGNIFICANT CHANGE UP (ref 3.8–10.5)

## 2021-03-24 PROCEDURE — 99232 SBSQ HOSP IP/OBS MODERATE 35: CPT | Mod: GC

## 2021-03-24 PROCEDURE — 99232 SBSQ HOSP IP/OBS MODERATE 35: CPT

## 2021-03-24 PROCEDURE — 70553 MRI BRAIN STEM W/O & W/DYE: CPT | Mod: 26

## 2021-03-24 RX ORDER — POTASSIUM CHLORIDE 20 MEQ
40 PACKET (EA) ORAL ONCE
Refills: 0 | Status: COMPLETED | OUTPATIENT
Start: 2021-03-24 | End: 2021-03-24

## 2021-03-24 RX ADMIN — Medication 40 MILLIEQUIVALENT(S): at 10:22

## 2021-03-24 RX ADMIN — Medication 5 MILLIGRAM(S): at 06:25

## 2021-03-24 RX ADMIN — Medication 1 MILLIGRAM(S): at 11:13

## 2021-03-24 RX ADMIN — Medication 105 MILLIGRAM(S): at 21:41

## 2021-03-24 RX ADMIN — Medication 105 MILLIGRAM(S): at 06:26

## 2021-03-24 RX ADMIN — PREGABALIN 1000 MICROGRAM(S): 225 CAPSULE ORAL at 11:13

## 2021-03-24 RX ADMIN — Medication 105 MILLIGRAM(S): at 14:18

## 2021-03-24 RX ADMIN — OLANZAPINE 5 MILLIGRAM(S): 15 TABLET, FILM COATED ORAL at 21:41

## 2021-03-24 RX ADMIN — Medication 5 MILLIGRAM(S): at 17:20

## 2021-03-24 RX ADMIN — Medication 1 TABLET(S): at 11:13

## 2021-03-24 NOTE — PROGRESS NOTE BEHAVIORAL HEALTH - SUMMARY
Patient is a 66 yo female, domiciled with  and son, retired, , with PPHx of depression and anxiety, PMH of COPD, PUD, lumbar spinal stenosis s/p laminectomy now with a 40 lb weight loss, history of alcohol and benzodiazepine abuse, on Valium 10 mg for 20+ years, who presented to the ED for vaginal/rectal bleeding. Psych consulted for reported symptoms of psychosis including hallucinations and paranoia.     Paranoid delusion and hallucinations started prior to Valium taper. There may be an underlying primary psychiatric disorder. Pt reports recent memory problems which may be related to an undiagnosed neurocognitive disorder, early dementia secondary to hx alcohol use or due to poor concentration from anxiety or benzodiazepine withdrawal.  Patient appears more organized today since starting an antipsychotic, Zyprexa  for her paranoia and her disorganized thoughts.  She also seems calmer since restarting Valium 5mg po BID since she has a history of continuing the Valium at home in the past few weeks.   Pt says that she would like for her  and son to become her proxy.  She has capacity to designate a proxy, though had difficulty with the medical decisions concerning the contrast for her CT scans.
Patient is a 66 yo female, domiciled with  and son, retired, , with PPHx of depression and anxiety, PMH of COPD, PUD, lumbar spinal stenosis s/p laminectomy now with a 40 lb weight loss, history of alcohol and benzodiazepine abuse, on Valium 10 mg for 20+ years, who presented to the ED for vaginal/rectal bleeding. Psych consulted for reported symptoms of psychosis including hallucinations and paranoia.     Paranoid delusion and hallucinations started prior to Valium taper. There may be an underlying primary psychiatric disorder. Pt reports recent memory problems which may be related to an undiagnosed neurocognitive disorder, early dementia secondary to hx alcohol use or due to poor concentration from anxiety or benzodiazepine withdrawal.  Patient appears more organized today since starting an antipsychotic, Zyprexa  for her paranoia and her disorganized thoughts.  She also seems calmer since restarting Valium 5mg po BID since she has a history of continuing the Valium at home in the past few weeks.   Pt says that she would like for her  and son to become her proxy.  She has capacity to designate a proxy, though had difficulty with the medical decisions concerning the contrast for her CT scans.

## 2021-03-24 NOTE — PROGRESS NOTE ADULT - ASSESSMENT
Impression:    64 yo F w/ hx of COPD (not on home oxygen), prior reported history of gastric and duodenal ulcers (H. pylori negative), lumbar spinal stenosis s/p laminectomy, anxiety, EtOH and benzodiazepine dependence w/ recent admission 02/10 - 02/16 for benzodiazepine withdrawal who presented with report of vaginal bleeding, found to have AMS and with hallucinations, and with anemia.    # Normocytic anemia with report of vaginal bleeding: Currently with no overt GI bleeding, HD stable, and with downtrending Hgb from 13 in 2/2021 to ~ 10 currently. No evidence of GI bleeding at this time. Iron studies most consistent with anemia of chronic inflammation. UA notable for large RBC. Patient reportedly declining CT scans.    Recommendations:  - F/U CT A/P official read  - Work-up of hematuria per primary team  - No plan for endoscopic evaluation at this time given no overt GI bleeding and in setting of AMS, she will not agree to drink bowel prep/go for endoscopic evaluation  - Can re-consider endoscopic evaluation if patient's mental status improves or develops overt GI bleeding  - Monitor CBCs daily  - Monitor bowel movements  - PPI empirically once daily  - Transfuse for goal Hgb >/= 7.0  - Maintain active type and screen  - Rest of care per primary team    Ashish Covington MD  Gastroenterology Fellow  Please contact via [employer] Teams    Please call GI ([phone]) or e-mail [email or web address] if there are any additional questions or concerns, during weekdays from 8 am to 5 pm.     Please call answering service for on-call GI fellow ([phone]) after 5pm and before 8am, and on weekends.   Impression:    64 yo F w/ hx of COPD (not on home oxygen), prior reported history of gastric and duodenal ulcers (H. pylori negative), lumbar spinal stenosis s/p laminectomy, anxiety, EtOH and benzodiazepine dependence w/ recent admission 02/10 - 02/16 for benzodiazepine withdrawal who presented with report of vaginal bleeding, found to have AMS and with hallucinations, and with anemia.    # Normocytic anemia with report of vaginal bleeding: Currently with no overt GI bleeding, HD stable, and with downtrending Hgb from 13 in 2/2021 to ~ 10 currently. No evidence of GI bleeding at this time. Iron studies most consistent with anemia of chronic inflammation. UA notable for large RBC. Patient reportedly declining CT scans.    Recommendations:  - F/U CT A/P official read  - Work-up of hematuria per primary team  - No plan for endoscopic evaluation at this time given no overt GI bleeding and in setting of AMS  - Can re-consider endoscopic evaluation if there are signs of overt GI bleeding  - Monitor CBCs daily  - Monitor bowel movements  - PPI empirically once daily  - Transfuse for goal Hgb >/= 7.0  - Maintain active type and screen  - Rest of care per primary team    Ashish Covington MD  Gastroenterology Fellow  Please contact via [employer] Teams    Please call GI ([phone]) or e-mail [email or web address] if there are any additional questions or concerns, during weekdays from 8 am to 5 pm.     Please call answering service for on-call GI fellow ([phone]) after 5pm and before 8am, and on weekends.

## 2021-03-24 NOTE — PROGRESS NOTE ADULT - PROBLEM SELECTOR PLAN 4
- not on home O2  - PRN Duoneb - per pt she is not on medications at home  - on last admission pt was on Remeron 7.5 mg QHS and Valium 2mg TID for insomnia w/ PRN ativan 1mg q6 for severe anxiety  - Valium 5 mg BID  - olanzapine qHS

## 2021-03-24 NOTE — PROGRESS NOTE ADULT - PROBLEM SELECTOR PLAN 2
- unclear bleeding source   - need CT with contrast but patient has refused multiple times  - psych eval appreciated  - TVUS pending but unclear benefit  - check FOBT  - hold of VTE ppx i/s/o possible bleed - pt acutely altered 3/21 in pm, has been having issues with paranoia and hallucinations at home for year prior to admission  - c/f cocaine use? vs benzo withdrawal vs paraneoplastic disease (40 lb weight loss)  - neuro - rec MRI - unclear if patient will be able to tolerate exam  - psychiatric consult - valium BID and olanzapine  - utox neg  - B12/folate/thiamine   - heavy metal screen - collected  - TSH wnl

## 2021-03-24 NOTE — PROGRESS NOTE BEHAVIORAL HEALTH - AXIS III
PMH of COPD, PUD, lumbar spinal stenosis s/p laminectomy now with a 40 lb weight loss
PMH of COPD, PUD, lumbar spinal stenosis s/p laminectomy now with a 40 lb weight loss

## 2021-03-24 NOTE — PROGRESS NOTE ADULT - ASSESSMENT
66 yo F w/ PMHx of COPD (not on home O2 or BIPAP), gastric and duodenal ulcers (H. pylori negative), lumbar spinal stenosis s/p laminectomy, anxiety, alcohol and benzodiazepine abuse w/ recent admission 02/10 - 02/16 for benzodiazepine withdrawal who is presenting for suspected GI/ bleeding of unspecified origin.

## 2021-03-24 NOTE — PROGRESS NOTE BEHAVIORAL HEALTH - NSBHCHARTREVIEWIMAGING_PSY_A_CORE FT
< from: Xray Chest 1 View AP/PA (03.20.21 @ 17:39) >    EXAM:  XR CHEST AP OR PA 1V                            PROCEDURE DATE:  03/20/2021            INTERPRETATION:  CLINICAL INFORMATION: Shortness of breath. Evaluate for acute pathology.    TECHNIQUE: Frontal view of the chest.    COMPARISON: Frontal view of the chest 12/9/2020.    FINDINGS:    Barrel chested, consistent with history of COPD  The lungs are clear.  No pleural effusion or pneumothorax.  Heart size is within normal limits.  No acute abnormality within visible osseous structures.      IMPRESSION:    Clear lungs.    < end of copied text >
< from: Xray Chest 1 View AP/PA (03.20.21 @ 17:39) >    EXAM:  XR CHEST AP OR PA 1V                            PROCEDURE DATE:  03/20/2021            INTERPRETATION:  CLINICAL INFORMATION: Shortness of breath. Evaluate for acute pathology.    TECHNIQUE: Frontal view of the chest.    COMPARISON: Frontal view of the chest 12/9/2020.    FINDINGS:    Barrel chested, consistent with history of COPD  The lungs are clear.  No pleural effusion or pneumothorax.  Heart size is within normal limits.  No acute abnormality within visible osseous structures.      IMPRESSION:    Clear lungs.    < end of copied text >

## 2021-03-24 NOTE — PROGRESS NOTE BEHAVIORAL HEALTH - NSBHFUPINTERVALHXFT_PSY_A_CORE
Pt says that she was able to get the ct scan.  she denies having any hallucinations or delusions since starting the Zyprexa 5mg at bedtime and has no current adverse effects.  she is calm and comfortable on the Valium 5mg po BID.
Pt says that she continues to be reluctant to consider having CT scans with contrast, but she is feeling supported by her son at her bedside who says he will stay with her so that she is more comfortable.  Pt is with her son, Yann by her bedside and says that she is glad that he will stay with her. Pt says that she would like for her  and son to become her proxy.  She has capacity to designate a proxy.

## 2021-03-24 NOTE — PROGRESS NOTE ADULT - ATTENDING COMMENTS
Bleeding- evaluated by GYN, , GI- imaging unremarkable- no plans for endoscopic eval- Hg stable  Hallucinations/concern for benzo withdrawal- improved- cont Valium 5 mg PO BID  Discharge planning to home with outpatient PT

## 2021-03-24 NOTE — PROGRESS NOTE ADULT - ATTENDING COMMENTS
Patient seen and examined, agree with above. No BM today. H/H is stable, no overt signs of bleeding. Would not pursue endoscopic evaluation at this time given stable H/H and no clinical bleeding.

## 2021-03-24 NOTE — PROGRESS NOTE BEHAVIORAL HEALTH - NSBHCONSULTFOLLOWAFTERCARE_PSY_A_CORE FT
Pt may be referred to [facility] services [phone] or walk in [facility] [phone]
Pt may be referred to [facility] services [phone] or walk in [facility] [phone]

## 2021-03-24 NOTE — PROGRESS NOTE ADULT - SUBJECTIVE AND OBJECTIVE BOX
Chief Complaint: Vaginal bleeding  Reason for consult: Anemia in setting of vaginal bleeding and hematuria    Interval Events:   - Patient denied GI complaints, however, appears to be hallucinating still    Allergies:  Bananas (Vomiting; Diarrhea)  latex (Rash)  Levaquin (Other (Moderate))    Hospital Medications:  albuterol/ipratropium for Nebulization 3 milliLiter(s) Nebulizer every 6 hours PRN  cyanocobalamin 1000 MICROGram(s) Oral daily  diazepam    Tablet 5 milliGRAM(s) Oral two times a day  folic acid 1 milliGRAM(s) Oral daily  multivitamin 1 Tablet(s) Oral daily  OLANZapine 5 milliGRAM(s) Oral at bedtime  thiamine IVPB 500 milliGRAM(s) IV Intermittent every 8 hours    PMHX/PSHX:  Chronic midline back pain, unspecified back location    Anxiety    GERD (gastroesophageal reflux disease)    COPD (chronic obstructive pulmonary disease)    H/O laminectomy    FH: cholecystectomy        Family history:  FH: brain tumor    FH: breast cancer    No pertinent family history in first degree relatives        ROS: Unable to obtain    PHYSICAL EXAM:   Vital Signs:  Vital Signs Last 24 Hrs  T(C): 36.7 (24 Mar 2021 05:03), Max: 37.2 (23 Mar 2021 12:05)  T(F): 98 (24 Mar 2021 05:03), Max: 99 (23 Mar 2021 12:05)  HR: 77 (24 Mar 2021 05:03) (71 - 77)  BP: 111/74 (24 Mar 2021 05:03) (111/74 - 138/82)  BP(mean): --  RR: 17 (24 Mar 2021 05:03) (17 - 18)  SpO2: 97% (24 Mar 2021 05:03) (96% - 98%)    GENERAL:  No acute distress  HEENT:  Normocephalic/atraumatic,  no scleral icterus  CHEST:  Normal effort, no accessory muscle use  ABDOMEN:  Soft, non-tender, non-distended  EXTREMITIES:  No cyanosis, clubbing, or edema  SKIN:  No rash/erythema  NEURO:  Alert and oriented x 1 (self)    LABS:                        10.2   7.20  )-----------( 432      ( 24 Mar 2021 07:01 )             32.5     Mean Cell Volume: 95.6 fl (-21 @ 07:01)        141  |  104  |  13  ----------------------------<  100<H>  3.2<L>   |  26  |  0.76    Ca    8.7      24 Mar 2021 07:02  Phos  3.0     03-24  Mg     1.7     -24    TPro  5.6<L>  /  Alb  2.9<L>  /  TBili  0.1<L>  /  DBili  x   /  AST  15  /  ALT  17  /  AlkPhos  48  -24    LIVER FUNCTIONS - ( 24 Mar 2021 07:02 )  Alb: 2.9 g/dL / Pro: 5.6 g/dL / ALK PHOS: 48 U/L / ALT: 17 U/L / AST: 15 U/L / GGT: x           PT/INR - ( 24 Mar 2021 07:01 )   PT: 11.1 sec;   INR: 0.92 ratio         PTT - ( 24 Mar 2021 07:01 )  PTT:27.9 sec  Urinalysis Basic - ( 23 Mar 2021 21:47 )    Color: Light Yellow / Appearance: Clear / S.018 / pH: x  Gluc: x / Ketone: Negative  / Bili: Negative / Urobili: Negative   Blood: x / Protein: Negative / Nitrite: Negative   Leuk Esterase: Negative / RBC: 5 /hpf / WBC 2 /HPF   Sq Epi: x / Non Sq Epi: 1 /hpf / Bacteria: Negative                              10.2   7.20  )-----------( 432      ( 24 Mar 2021 07:01 )             32.5                         9.7    5.71  )-----------( 370      ( 23 Mar 2021 07:09 )             30.2                         10.4   6.09  )-----------( 374      ( 22 Mar 2021 09:03 )             32.9     Imaging:    CT A/P w/ IV contrast: Official read pending.    < from: CT Abdomen and Pelvis w/ IV Cont (21 @ 20:02) >    ******PRELIMINARY REPORT******    ******PRELIMINARY REPORT******          EXAM:  CT ABDOMEN AND PELVIS IC                            PROCEDURE DATE:  2021      ******PRELIMINARY REPORT******    ******PRELIMINARY REPORT******          INTERPRETATION:  Trace wall thickening at the left ureterovesical junction (series 7, images 110-114).    No active GI bleed.    Right adenexal dermoid cyst noted.    ******PRELIMINARY REPORT******    ******PRELIMINARY REPORT******          WALDO LARA; Resident Radiology    < end of copied text >

## 2021-03-24 NOTE — CHART NOTE - NSCHARTNOTEFT_GEN_A_CORE
Pt seen 3/21 for postmenopausal bleeding.  Recommended TVUS.  TVUS resulted, as below:    < from: US Transvaginal (03.23.21 @ 12:45) >    EXAM:  US TRANSVAGINAL                          PROCEDURE DATE:  03/23/2021        INTERPRETATION:  CLINICAL INFORMATION: Postmenopausal bleeding.    LMP: Postmenopausal for at least 10 years.    COMPARISON: Ultrasound pelvis dated 8/24/2011.CT abdomen pelvis dated 12/9/2020.    TECHNIQUE:  Endovaginal pelvic sonogram as per order. Transabdominal pelvic sonogram was also performed as part of our standard protocol.    FINDINGS:    Uterus: 3.9 cm x 1.7 cm x 3.5 cm. Atrophic.  Endometrium: 0.1 cm. Within normal limits.    Right ovary: 2.9 cm x 1.6 cm x 1.7 cm. Heterogeneous with hyperechoic elements. On prior CT, calcification was demonstrated with areas of fat density. No internal vascularity.  Left ovary: 1.5 cm x 1.8 cm x 1.4 cm. Heterogeneous 1.2 x 1.3 x 1.3 cm  cystic mass with internal hyperechoic nodularity. On the prior CT, this lesion demonstrated areas of fat. No internal vascularity within the mass.    Fluid: None.    IMPRESSION:  Bilateral small ovarian lesions, likely dermoids. The right ovarian lesion is hyperechoic and demonstrated fat and calcification on prior CT. The left ovarian lesion has hyperechoic foci and demonstrated fat on prior CT.    Atrophic endometrium with trace of fluid.      THAD BURGER MD; Resident Radiology  This document has been electronically signed.  JESSICA RENTERIA M.D., ATTENDING RADIOLOGIST  This document has been electronically signed. Mar 23 2021  5:02PM    < end of copied text >      Endometrial lining is 10mm.  No bleeding was noted on exam.    No acute GYN interventions at this time, would recommend outpt follow-up w GYN for PMB w endometrial biopsy.    D/w Dr. Zaynab Alvarenga PGY4

## 2021-03-24 NOTE — PROGRESS NOTE BEHAVIORAL HEALTH - NSBHCHARTREVIEWVS_PSY_A_CORE FT
Vital Signs Last 24 Hrs  T(C): 36.3 (24 Mar 2021 14:12), Max: 36.9 (23 Mar 2021 20:56)  T(F): 97.3 (24 Mar 2021 14:12), Max: 98.5 (23 Mar 2021 20:56)  HR: 75 (24 Mar 2021 14:12) (71 - 77)  BP: 103/68 (24 Mar 2021 14:12) (103/68 - 128/-)  BP(mean): --  RR: 18 (24 Mar 2021 14:12) (17 - 18)  SpO2: 98% (24 Mar 2021 14:12) (96% - 98%)
Vital Signs Last 24 Hrs  T(C): 37.2 (23 Mar 2021 12:05), Max: 37.2 (23 Mar 2021 12:05)  T(F): 99 (23 Mar 2021 12:05), Max: 99 (23 Mar 2021 12:05)  HR: 72 (23 Mar 2021 12:05) (72 - 80)  BP: 138/82 (23 Mar 2021 12:05) (138/82 - 144/84)  BP(mean): --  RR: 18 (23 Mar 2021 12:05) (17 - 18)  SpO2: 98% (23 Mar 2021 12:05) (98% - 98%)

## 2021-03-24 NOTE — PROGRESS NOTE BEHAVIORAL HEALTH - NSBHFUPINTERVALCCFT_PSY_A_CORE
"I feel better."
"They keep leaving me in the hallway when I go for tests and I have to flag someone down to get back to my room. "

## 2021-03-24 NOTE — PROGRESS NOTE ADULT - PROBLEM SELECTOR PLAN 6
- DVT ppx: SCDs in setting of bleeding  - Diet: Regular diet  - PT eval Transitions of Care Status:  1.  Name of PCP:  2.  PCP Contacted on Admission: [ ] Y    [ ] N    3.  PCP contacted at Discharge: [ ] Y    [ ] N    [ ] N/A  4.  Post-Discharge Appointment Date and Location:  5.  Summary of Handoff given to PCP:

## 2021-03-24 NOTE — PROGRESS NOTE ADULT - SUBJECTIVE AND OBJECTIVE BOX
London Santos MD  Internal Medicine Resident  [phone]    PATIENT:  JUWAN OBANDO  [id number]    CHIEF COMPLAINT:  Patient is a 65y old  Female who presents with a chief complaint of Vaginal bleeding (24 Mar 2021 09:33)      INTERVAL HISTORY/OVERNIGHT EVENTS:      REVIEW OF SYSTEMS:    Constitutional:     [ ] negative [ ] fevers [ ] chills [ ] weight loss [ ] weight gain  HEENT:                  [ ] negative [ ] dry eyes [ ] eye irritation [ ] postnasal drip [ ] nasal congestion  CV:                         [ ] negative  [ ] chest pain [ ] orthopnea [ ] palpitations [ ] murmur  Resp:                     [ ] negative [ ] cough [ ] shortness of breath [ ] dyspnea [ ] wheezing [ ] sputum [ ] hemoptysis  GI:                          [ ] negative [ ] nausea [ ] vomiting [ ] diarrhea [ ] constipation [ ] abd pain [ ] dysphagia   :                        [ ] negative [ ] dysuria [ ] nocturia [ ] hematuria [ ] increased urinary frequency  Musculoskeletal: [ ] negative [ ] back pain [ ] myalgias [ ] arthralgias [ ] fracture  Skin:                       [ ] negative [ ] rash [ ] itch  Neurological:        [ ] negative [ ] headache [ ] dizziness [ ] syncope [ ] weakness [ ] numbness  Psychiatric:           [ ] negative [ ] anxiety [ ] depression  Endocrine:            [ ] negative [ ] diabetes [ ] thyroid problem  Heme/Lymph:      [ ] negative [ ] anemia [ ] bleeding problem  Allergic/Immune: [ ] negative [ ] itchy eyes [ ] nasal discharge [ ] hives [ ] angioedema    [ ] All other systems negative  [ ] Unable to assess ROS because ________.    MEDICATIONS:  MEDICATIONS  (STANDING):  cyanocobalamin 1000 MICROGram(s) Oral daily  diazepam    Tablet 5 milliGRAM(s) Oral two times a day  folic acid 1 milliGRAM(s) Oral daily  multivitamin 1 Tablet(s) Oral daily  OLANZapine 5 milliGRAM(s) Oral at bedtime  thiamine IVPB 500 milliGRAM(s) IV Intermittent every 8 hours    MEDICATIONS  (PRN):  albuterol/ipratropium for Nebulization 3 milliLiter(s) Nebulizer every 6 hours PRN Shortness of Breath and/or Wheezing      ALLERGIES:  Allergies    Bananas (Vomiting; Diarrhea)  latex (Rash)  Levaquin (Other (Moderate))    Intolerances        OBJECTIVE:  ICU Vital Signs Last 24 Hrs  T(C): 36.7 (24 Mar 2021 05:03), Max: 37.2 (23 Mar 2021 12:05)  T(F): 98 (24 Mar 2021 05:03), Max: 99 (23 Mar 2021 12:05)  HR: 77 (24 Mar 2021 05:03) (71 - 77)  BP: 111/74 (24 Mar 2021 05:03) (111/74 - 138/82)  BP(mean): --  ABP: --  ABP(mean): --  RR: 17 (24 Mar 2021 05:03) (17 - 18)  SpO2: 97% (24 Mar 2021 05:03) (96% - 98%)      Adult Advanced Hemodynamics Last 24 Hrs  CVP(mm Hg): --  CVP(cm H2O): --  CO: --  CI: --  PA: --  PA(mean): --  PCWP: --  SVR: --  SVRI: --  PVR: --  PVRI: --  CAPILLARY BLOOD GLUCOSE        CAPILLARY BLOOD GLUCOSE        I&O's Summary    23 Mar 2021 07:01  -  24 Mar 2021 07:00  --------------------------------------------------------  IN: 1410 mL / OUT: 925 mL / NET: 485 mL      Daily     Daily     PHYSICAL EXAMINATION:  General: WN/WD NAD  HEENT: PERRLA, EOMI, moist mucous membranes  Neurology: A&Ox3, nonfocal, RAMOS x 4  Respiratory: CTA B/L, normal respiratory effort, no wheezes, crackles, rales  CV: RRR, S1S2, no murmurs, rubs or gallops  Abdominal: Soft, NT, ND +BS, Last BM  Extremities: No edema, + peripheral pulses  Incisions:   Tubes:    LABS:                          10.2   7.20  )-----------( 432      ( 24 Mar 2021 07:01 )             32.5     03-    141  |  104  |  13  ----------------------------<  100<H>  3.2<L>   |  26  |  0.76    Ca    8.7      24 Mar 2021 07:02  Phos  3.0     03-24  Mg     1.7     03-24    TPro  5.6<L>  /  Alb  2.9<L>  /  TBili  0.1<L>  /  DBili  x   /  AST  15  /  ALT  17  /  AlkPhos  48  03-24    LIVER FUNCTIONS - ( 24 Mar 2021 07:02 )  Alb: 2.9 g/dL / Pro: 5.6 g/dL / ALK PHOS: 48 U/L / ALT: 17 U/L / AST: 15 U/L / GGT: x           PT/INR - ( 24 Mar 2021 07:01 )   PT: 11.1 sec;   INR: 0.92 ratio         PTT - ( 24 Mar 2021 07:01 )  PTT:27.9 sec        Urinalysis Basic - ( 23 Mar 2021 21:47 )    Color: Light Yellow / Appearance: Clear / S.018 / pH: x  Gluc: x / Ketone: Negative  / Bili: Negative / Urobili: Negative   Blood: x / Protein: Negative / Nitrite: Negative   Leuk Esterase: Negative / RBC: 5 /hpf / WBC 2 /HPF   Sq Epi: x / Non Sq Epi: 1 /hpf / Bacteria: Negative        TELEMETRY:     EKG:     IMAGING:       London Santos MD  Internal Medicine Resident  [phone]    PATIENT:  JUWAN OBANDO  [id number]    CHIEF COMPLAINT:  Patient is a 65y old  Female who presents with a chief complaint of Vaginal bleeding (24 Mar 2021 09:33)      INTERVAL HISTORY/OVERNIGHT EVENTS:  - overnight no events  - feels better after starting Valium  - oriented today  - willing to get MRI, other workup as necessary  - felt happy visiting with son  - rad monteiro  - feels very hungry this morning, eating her German toast very briskly.  - slept well    MEDICATIONS:  MEDICATIONS  (STANDING):  cyanocobalamin 1000 MICROGram(s) Oral daily  diazepam    Tablet 5 milliGRAM(s) Oral two times a day  folic acid 1 milliGRAM(s) Oral daily  multivitamin 1 Tablet(s) Oral daily  OLANZapine 5 milliGRAM(s) Oral at bedtime  thiamine IVPB 500 milliGRAM(s) IV Intermittent every 8 hours    MEDICATIONS  (PRN):  albuterol/ipratropium for Nebulization 3 milliLiter(s) Nebulizer every 6 hours PRN Shortness of Breath and/or Wheezing    ALLERGIES:  Allergies    Bananas (Vomiting; Diarrhea)  latex (Rash)  Levaquin (Other (Moderate))    Intolerances    OBJECTIVE:  ICU Vital Signs Last 24 Hrs  T(C): 36.7 (24 Mar 2021 05:03), Max: 37.2 (23 Mar 2021 12:05)  T(F): 98 (24 Mar 2021 05:03), Max: 99 (23 Mar 2021 12:05)  HR: 77 (24 Mar 2021 05:03) (71 - 77)  BP: 111/74 (24 Mar 2021 05:03) (111/74 - 138/82)  RR: 17 (24 Mar 2021 05:03) (17 - 18)  SpO2: 97% (24 Mar 2021 05:03) (96% - 98%)    I&O's Summary  23 Mar 2021 07:01  -  24 Mar 2021 07:00  --------------------------------------------------------  IN: 1410 mL / OUT: 925 mL / NET: 485 mL    PHYSICAL EXAMINATION:  General: WN/WD NAD, frail appearing, cane at bedside  HEENT: PERRL, EOMI, moist mucous membranes  Neurology: A&O to person place, time, RAMOS x 4, ambulatory with minimal assistance, eating, coordinated fork to mouth completely normally  Respiratory: CTA B/L, normal respiratory effort, no wheezes, crackles, rales  CV: RRR, S1S2, no murmurs, rubs or gallops  Abdominal: Soft, NT, ND +BS  : no blood on pelvic digital exam, external genitalia wnl, no perineal anesthesia  Extremities: No edema, +peripheral pulses      LABS:                        10.2   7.20  )-----------( 432      ( 24 Mar 2021 07:01 )             32.5     03-    141  |  104  |  13  ----------------------------<  100<H>  3.2<L>   |  26  |  0.76    Ca    8.7      24 Mar 2021 07:02  Phos  3.0     03-24  Mg     1.7     03-24    TPro  5.6<L>  /  Alb  2.9<L>  /  TBili  0.1<L>  /  DBili  x   /  AST  15  /  ALT  17  /  AlkPhos  48  03-24    LIVER FUNCTIONS - ( 24 Mar 2021 07:02 )  Alb: 2.9 g/dL / Pro: 5.6 g/dL / ALK PHOS: 48 U/L / ALT: 17 U/L / AST: 15 U/L / GGT: x           PT/INR - ( 24 Mar 2021 07:01 )   PT: 11.1 sec;   INR: 0.92 ratio    PTT - ( 24 Mar 2021 07:01 )  PTT:27.9 sec    Urinalysis Basic - ( 23 Mar 2021 21:47 )    Color: Light Yellow / Appearance: Clear / S.018 / pH: x  Gluc: x / Ketone: Negative  / Bili: Negative / Urobili: Negative   Blood: x / Protein: Negative / Nitrite: Negative   Leuk Esterase: Negative / RBC: 5 /hpf / WBC 2 /HPF   Sq Epi: x / Non Sq Epi: 1 /hpf / Bacteria: Negative London Santos MD  Internal Medicine Resident  [phone]    PATIENT:  JUWAN OBANDO  [id number]    CHIEF COMPLAINT:  Patient is a 65y old  Female who presents with a chief complaint of Vaginal bleeding (24 Mar 2021 09:33)    INTERVAL HISTORY/OVERNIGHT EVENTS:  - overnight no events  - feels better after starting Valium  - oriented today  - willing to get MRI, other workup as necessary  - felt happy visiting with son  - rad monteiro  - feels very hungry this morning, eating her Wol[place] toast very briskly.  - slept well    MEDICATIONS:  MEDICATIONS  (STANDING):  cyanocobalamin 1000 MICROGram(s) Oral daily  diazepam    Tablet 5 milliGRAM(s) Oral two times a day  folic acid 1 milliGRAM(s) Oral daily  multivitamin 1 Tablet(s) Oral daily  OLANZapine 5 milliGRAM(s) Oral at bedtime  thiamine IVPB 500 milliGRAM(s) IV Intermittent every 8 hours    MEDICATIONS  (PRN):  albuterol/ipratropium for Nebulization 3 milliLiter(s) Nebulizer every 6 hours PRN Shortness of Breath and/or Wheezing    ALLERGIES:  Allergies    Bananas (Vomiting; Diarrhea)  latex (Rash)  Levaquin (Other (Moderate))    Intolerances    OBJECTIVE:  T(C): 36.7 (24 Mar 2021 05:03), Max: 37.2 (23 Mar 2021 12:05)  T(F): 98 (24 Mar 2021 05:03), Max: 99 (23 Mar 2021 12:05)  HR: 77 (24 Mar 2021 05:03) (71 - 77)  BP: 111/74 (24 Mar 2021 05:03) (111/74 - 138/82)  RR: 17 (24 Mar 2021 05:03) (17 - 18)  SpO2: 97% (24 Mar 2021 05:03) (96% - 98%)    I&O's Summary  23 Mar 2021 07:01  -  24 Mar 2021 07:00  --------------------------------------------------------  IN: 1410 mL / OUT: 925 mL / NET: 485 mL    PHYSICAL EXAMINATION:  General: WN/WD NAD, frail appearing, cane at bedside  HEENT: PERRL, EOMI, moist mucous membranes  Neurology: A&O to person place, time, RAMOS x 4, ambulatory with minimal assistance, eating, coordinated fork to mouth completely normally  Respiratory: CTA B/L, normal respiratory effort, no wheezes, crackles, rales  CV: RRR, S1S2, no murmurs, rubs or gallops  Abdominal: Soft, NT, ND +BS  : no blood on pelvic digital exam, external genitalia wnl, no perineal anesthesia  Extremities: No edema, +peripheral pulses      LABS:                        10.2   7.20  )-----------( 432      ( 24 Mar 2021 07:01 )             32.5     03-24    141  |  104  |  13  ----------------------------<  100<H>  3.2<L>   |  26  |  0.76    Ca    8.7      24 Mar 2021 07:02  Phos  3.0     03-24  Mg     1.7     03-24    TPro  5.6<L>  /  Alb  2.9<L>  /  TBili  0.1<L>  /  DBili  x   /  AST  15  /  ALT  17  /  AlkPhos  48  03-24    LIVER FUNCTIONS - ( 24 Mar 2021 07:02 )  Alb: 2.9 g/dL / Pro: 5.6 g/dL / ALK PHOS: 48 U/L / ALT: 17 U/L / AST: 15 U/L / GGT: x           PT/INR - ( 24 Mar 2021 07:01 )   PT: 11.1 sec;   INR: 0.92 ratio    PTT - ( 24 Mar 2021 07:01 )  PTT:27.9 sec    Urinalysis Basic - ( 23 Mar 2021 21:47 )    Color: Light Yellow / Appearance: Clear / S.018 / pH: x  Gluc: x / Ketone: Negative  / Bili: Negative / Urobili: Negative   Blood: x / Protein: Negative / Nitrite: Negative   Leuk Esterase: Negative / RBC: 5 /hpf / WBC 2 /HPF   Sq Epi: x / Non Sq Epi: 1 /hpf / Bacteria: Negative

## 2021-03-25 ENCOUNTER — TRANSCRIPTION ENCOUNTER (OUTPATIENT)
Age: 66
End: 2021-03-25

## 2021-03-25 VITALS
RESPIRATION RATE: 18 BRPM | DIASTOLIC BLOOD PRESSURE: 86 MMHG | SYSTOLIC BLOOD PRESSURE: 137 MMHG | HEART RATE: 92 BPM | OXYGEN SATURATION: 97 % | TEMPERATURE: 98 F

## 2021-03-25 DIAGNOSIS — Z71.89 OTHER SPECIFIED COUNSELING: ICD-10-CM

## 2021-03-25 LAB
ALBUMIN SERPL ELPH-MCNC: 3 G/DL — LOW (ref 3.3–5)
ALP SERPL-CCNC: 42 U/L — SIGNIFICANT CHANGE UP (ref 40–120)
ALT FLD-CCNC: 14 U/L — SIGNIFICANT CHANGE UP (ref 10–45)
ANION GAP SERPL CALC-SCNC: 11 MMOL/L — SIGNIFICANT CHANGE UP (ref 5–17)
AST SERPL-CCNC: 15 U/L — SIGNIFICANT CHANGE UP (ref 10–40)
BASOPHILS # BLD AUTO: 0.07 K/UL — SIGNIFICANT CHANGE UP (ref 0–0.2)
BASOPHILS NFR BLD AUTO: 1 % — SIGNIFICANT CHANGE UP (ref 0–2)
BILIRUB SERPL-MCNC: 0.2 MG/DL — SIGNIFICANT CHANGE UP (ref 0.2–1.2)
BUN SERPL-MCNC: 20 MG/DL — SIGNIFICANT CHANGE UP (ref 7–23)
CALCIUM SERPL-MCNC: 8.9 MG/DL — SIGNIFICANT CHANGE UP (ref 8.4–10.5)
CHLORIDE SERPL-SCNC: 106 MMOL/L — SIGNIFICANT CHANGE UP (ref 96–108)
CO2 SERPL-SCNC: 26 MMOL/L — SIGNIFICANT CHANGE UP (ref 22–31)
CREAT SERPL-MCNC: 0.94 MG/DL — SIGNIFICANT CHANGE UP (ref 0.5–1.3)
EOSINOPHIL # BLD AUTO: 0.15 K/UL — SIGNIFICANT CHANGE UP (ref 0–0.5)
EOSINOPHIL NFR BLD AUTO: 2 % — SIGNIFICANT CHANGE UP (ref 0–6)
FOLATE SERPL-MCNC: 9.9 NG/ML — SIGNIFICANT CHANGE UP
GLUCOSE SERPL-MCNC: 85 MG/DL — SIGNIFICANT CHANGE UP (ref 70–99)
HCT VFR BLD CALC: 35.1 % — SIGNIFICANT CHANGE UP (ref 34.5–45)
HGB BLD-MCNC: 10.8 G/DL — LOW (ref 11.5–15.5)
IMM GRANULOCYTES NFR BLD AUTO: 1.4 % — SIGNIFICANT CHANGE UP (ref 0–1.5)
LYMPHOCYTES # BLD AUTO: 3.11 K/UL — SIGNIFICANT CHANGE UP (ref 1–3.3)
LYMPHOCYTES # BLD AUTO: 42.4 % — SIGNIFICANT CHANGE UP (ref 13–44)
MAGNESIUM SERPL-MCNC: 1.8 MG/DL — SIGNIFICANT CHANGE UP (ref 1.6–2.6)
MCHC RBC-ENTMCNC: 30 PG — SIGNIFICANT CHANGE UP (ref 27–34)
MCHC RBC-ENTMCNC: 30.8 GM/DL — LOW (ref 32–36)
MCV RBC AUTO: 97.5 FL — SIGNIFICANT CHANGE UP (ref 80–100)
MONOCYTES # BLD AUTO: 0.47 K/UL — SIGNIFICANT CHANGE UP (ref 0–0.9)
MONOCYTES NFR BLD AUTO: 6.4 % — SIGNIFICANT CHANGE UP (ref 2–14)
NEUTROPHILS # BLD AUTO: 3.44 K/UL — SIGNIFICANT CHANGE UP (ref 1.8–7.4)
NEUTROPHILS NFR BLD AUTO: 46.8 % — SIGNIFICANT CHANGE UP (ref 43–77)
NRBC # BLD: 0 /100 WBCS — SIGNIFICANT CHANGE UP (ref 0–0)
PHOSPHATE SERPL-MCNC: 3.1 MG/DL — SIGNIFICANT CHANGE UP (ref 2.5–4.5)
PLATELET # BLD AUTO: 480 K/UL — HIGH (ref 150–400)
POTASSIUM SERPL-MCNC: 4.2 MMOL/L — SIGNIFICANT CHANGE UP (ref 3.5–5.3)
POTASSIUM SERPL-SCNC: 4.2 MMOL/L — SIGNIFICANT CHANGE UP (ref 3.5–5.3)
PROT SERPL-MCNC: 5.6 G/DL — LOW (ref 6–8.3)
RBC # BLD: 3.6 M/UL — LOW (ref 3.8–5.2)
RBC # FLD: 13.2 % — SIGNIFICANT CHANGE UP (ref 10.3–14.5)
SODIUM SERPL-SCNC: 143 MMOL/L — SIGNIFICANT CHANGE UP (ref 135–145)
VIT B12 SERPL-MCNC: 1037 PG/ML — SIGNIFICANT CHANGE UP (ref 232–1245)
WBC # BLD: 7.34 K/UL — SIGNIFICANT CHANGE UP (ref 3.8–10.5)
WBC # FLD AUTO: 7.34 K/UL — SIGNIFICANT CHANGE UP (ref 3.8–10.5)

## 2021-03-25 PROCEDURE — 84425 ASSAY OF VITAMIN B-1: CPT

## 2021-03-25 PROCEDURE — 0031A: CPT

## 2021-03-25 PROCEDURE — 83655 ASSAY OF LEAD: CPT

## 2021-03-25 PROCEDURE — 82435 ASSAY OF BLOOD CHLORIDE: CPT

## 2021-03-25 PROCEDURE — 93010 ELECTROCARDIOGRAM REPORT: CPT

## 2021-03-25 PROCEDURE — 76830 TRANSVAGINAL US NON-OB: CPT

## 2021-03-25 PROCEDURE — 86850 RBC ANTIBODY SCREEN: CPT

## 2021-03-25 PROCEDURE — 83540 ASSAY OF IRON: CPT

## 2021-03-25 PROCEDURE — 97530 THERAPEUTIC ACTIVITIES: CPT

## 2021-03-25 PROCEDURE — 82607 VITAMIN B-12: CPT

## 2021-03-25 PROCEDURE — 85027 COMPLETE CBC AUTOMATED: CPT

## 2021-03-25 PROCEDURE — 81001 URINALYSIS AUTO W/SCOPE: CPT

## 2021-03-25 PROCEDURE — 80307 DRUG TEST PRSMV CHEM ANLYZR: CPT

## 2021-03-25 PROCEDURE — 70553 MRI BRAIN STEM W/O & W/DYE: CPT

## 2021-03-25 PROCEDURE — 82728 ASSAY OF FERRITIN: CPT

## 2021-03-25 PROCEDURE — 84295 ASSAY OF SERUM SODIUM: CPT

## 2021-03-25 PROCEDURE — 84132 ASSAY OF SERUM POTASSIUM: CPT

## 2021-03-25 PROCEDURE — 86140 C-REACTIVE PROTEIN: CPT

## 2021-03-25 PROCEDURE — 97116 GAIT TRAINING THERAPY: CPT

## 2021-03-25 PROCEDURE — 84443 ASSAY THYROID STIM HORMONE: CPT

## 2021-03-25 PROCEDURE — 36000 PLACE NEEDLE IN VEIN: CPT

## 2021-03-25 PROCEDURE — 99239 HOSP IP/OBS DSCHRG MGMT >30: CPT

## 2021-03-25 PROCEDURE — 86901 BLOOD TYPING SEROLOGIC RH(D): CPT

## 2021-03-25 PROCEDURE — 83550 IRON BINDING TEST: CPT

## 2021-03-25 PROCEDURE — 85025 COMPLETE CBC W/AUTO DIFF WBC: CPT

## 2021-03-25 PROCEDURE — 74177 CT ABD & PELVIS W/CONTRAST: CPT

## 2021-03-25 PROCEDURE — 87086 URINE CULTURE/COLONY COUNT: CPT

## 2021-03-25 PROCEDURE — 95816 EEG AWAKE AND DROWSY: CPT

## 2021-03-25 PROCEDURE — 71045 X-RAY EXAM CHEST 1 VIEW: CPT

## 2021-03-25 PROCEDURE — 86769 SARS-COV-2 COVID-19 ANTIBODY: CPT

## 2021-03-25 PROCEDURE — 85730 THROMBOPLASTIN TIME PARTIAL: CPT

## 2021-03-25 PROCEDURE — U0005: CPT

## 2021-03-25 PROCEDURE — 84100 ASSAY OF PHOSPHORUS: CPT

## 2021-03-25 PROCEDURE — 80053 COMPREHEN METABOLIC PANEL: CPT

## 2021-03-25 PROCEDURE — 85610 PROTHROMBIN TIME: CPT

## 2021-03-25 PROCEDURE — 82300 ASSAY OF CADMIUM: CPT

## 2021-03-25 PROCEDURE — U0003: CPT

## 2021-03-25 PROCEDURE — 82330 ASSAY OF CALCIUM: CPT

## 2021-03-25 PROCEDURE — 80048 BASIC METABOLIC PNL TOTAL CA: CPT

## 2021-03-25 PROCEDURE — 97161 PT EVAL LOW COMPLEX 20 MIN: CPT

## 2021-03-25 PROCEDURE — 99285 EMERGENCY DEPT VISIT HI MDM: CPT | Mod: 25

## 2021-03-25 PROCEDURE — 82746 ASSAY OF FOLIC ACID SERUM: CPT

## 2021-03-25 PROCEDURE — 85014 HEMATOCRIT: CPT

## 2021-03-25 PROCEDURE — 83825 ASSAY OF MERCURY: CPT

## 2021-03-25 PROCEDURE — 82947 ASSAY GLUCOSE BLOOD QUANT: CPT

## 2021-03-25 PROCEDURE — 85018 HEMOGLOBIN: CPT

## 2021-03-25 PROCEDURE — 82272 OCCULT BLD FECES 1-3 TESTS: CPT

## 2021-03-25 PROCEDURE — 93005 ELECTROCARDIOGRAM TRACING: CPT

## 2021-03-25 PROCEDURE — 82175 ASSAY OF ARSENIC: CPT

## 2021-03-25 PROCEDURE — 85652 RBC SED RATE AUTOMATED: CPT

## 2021-03-25 PROCEDURE — 86900 BLOOD TYPING SEROLOGIC ABO: CPT

## 2021-03-25 PROCEDURE — 82803 BLOOD GASES ANY COMBINATION: CPT

## 2021-03-25 PROCEDURE — 83605 ASSAY OF LACTIC ACID: CPT

## 2021-03-25 PROCEDURE — 83735 ASSAY OF MAGNESIUM: CPT

## 2021-03-25 RX ORDER — JNJ-78436735 50000000000 [PFU]/.5ML
0.5 SUSPENSION INTRAMUSCULAR ONCE
Refills: 0 | Status: COMPLETED | OUTPATIENT
Start: 2021-03-25 | End: 2021-03-25

## 2021-03-25 RX ORDER — PREGABALIN 225 MG/1
1 CAPSULE ORAL
Qty: 0 | Refills: 0 | DISCHARGE
Start: 2021-03-25

## 2021-03-25 RX ORDER — IPRATROPIUM/ALBUTEROL SULFATE 18-103MCG
3 AEROSOL WITH ADAPTER (GRAM) INHALATION
Qty: 0 | Refills: 0 | DISCHARGE
Start: 2021-03-25

## 2021-03-25 RX ORDER — DIAZEPAM 5 MG
1 TABLET ORAL
Qty: 60 | Refills: 0
Start: 2021-03-25 | End: 2021-04-23

## 2021-03-25 RX ORDER — OLANZAPINE 15 MG/1
1 TABLET, FILM COATED ORAL
Qty: 30 | Refills: 0
Start: 2021-03-25 | End: 2021-04-23

## 2021-03-25 RX ORDER — FOLIC ACID 0.8 MG
1 TABLET ORAL
Qty: 0 | Refills: 0 | DISCHARGE
Start: 2021-03-25

## 2021-03-25 RX ORDER — THIAMINE MONONITRATE (VIT B1) 100 MG
1 TABLET ORAL
Qty: 30 | Refills: 0
Start: 2021-03-25 | End: 2021-04-23

## 2021-03-25 RX ADMIN — JNJ-78436735 0.5 MILLILITER(S): 50000000000 SUSPENSION INTRAMUSCULAR at 17:52

## 2021-03-25 RX ADMIN — PREGABALIN 1000 MICROGRAM(S): 225 CAPSULE ORAL at 11:26

## 2021-03-25 RX ADMIN — Medication 5 MILLIGRAM(S): at 06:42

## 2021-03-25 RX ADMIN — Medication 1 TABLET(S): at 11:26

## 2021-03-25 RX ADMIN — Medication 105 MILLIGRAM(S): at 06:42

## 2021-03-25 RX ADMIN — Medication 1 MILLIGRAM(S): at 11:26

## 2021-03-25 NOTE — PROGRESS NOTE ADULT - ASSESSMENT
64 yo F w/ PMHx of COPD (not on home O2 or BIPAP), gastric and duodenal ulcers (H. pylori negative), lumbar spinal stenosis s/p laminectomy, anxiety, alcohol and benzodiazepine abuse w/ recent admission 02/10 - 02/16 for benzodiazepine withdrawal who is presenting for suspected GI/ bleeding of unspecified origin.

## 2021-03-25 NOTE — PROGRESS NOTE ADULT - PROBLEM SELECTOR PLAN 4
- per pt she is not on medications at home  - on last admission pt was on Remeron 7.5 mg QHS and Valium 2mg TID for insomnia w/ PRN ativan 1mg q6 for severe anxiety  - Valium 5 mg BID  - olanzapine qHS

## 2021-03-25 NOTE — DISCHARGE NOTE PROVIDER - HOSPITAL COURSE
Ms. Bradshaw is a 64 yo woman w/ PMHx of COPD (not on home O2 or BIPAP), gastric and duodenal ulcers (H. pylori negative), lumbar spinal stenosis s/p laminectomy, anxiety, alcohol and benzodiazepine abuse w/ recent admission 02/10 - 02/16 for benzodiazepine withdrawal, who presented after noticing bright red blood in her pad on Saturday 3/20, which she endorsed being from the vagina. Pt has fecal and urinary incontinence at baseline and wears pads and diapers. She had a second episode in the ED but has not had any further bleeding episodes. Labs on admission revealed mild anemia and GYN was consulted but evaluation with exam, transvaginal ultrasound, and CT A/P were negative. GI was also consulted but their evaluation also was unremarkable and no blood    Ms. Bradshaw is a 66 yo woman w/ PMHx of COPD (not on home O2 or BIPAP), gastric and duodenal ulcers (H. pylori negative), lumbar spinal stenosis s/p laminectomy, anxiety, alcohol and benzodiazepine abuse w/ recent admission 02/10 - 02/16 for benzodiazepine withdrawal, who presented after noticing bright red blood in her pad on Saturday 3/20, which she endorsed being from the vagina. Pt has fecal and urinary incontinence at baseline and wears pads and diapers. She had a second episode in the ED but has not had any further bleeding episodes. Labs on admission revealed mild anemia and GYN was consulted but evaluation with exam, transvaginal ultrasound, and CT A/P were negative. GI was also consulted but their evaluation also was unremarkable and no blood nor sources of bleeding were identified. Urology was also consulted but evaluation did not determine any obvious sources of bleeding. CT A/P did demonstrate mildly delayed left nephrogram with mild left hydroureteronephrosis and thickening of the urothelium with haziness in the adjacent fat appreciated at the left extrarenal pelvis and left ureter throughout most of its course including the left ureterovesical junction but excluded any definitive obstructing calculi. However, this can be followed up outpatient. During the hospital course, neurology was consulted for concerns of auditory hallucinations overnight and examination revealed deficits in attention with tangential thought process and confabulation. However, workup and MRI of the brain did not find any identifiable causes. Psychiatry was also consulted and patient was started on zyprexa and valium. No obvious sources of bleeding were found and thus patient can be evaluated further on an outpatient basis.

## 2021-03-25 NOTE — DISCHARGE NOTE NURSING/CASE MANAGEMENT/SOCIAL WORK - NSDCVIVACCINE_GEN_ALL_CORE_FT
Severe acute respiratory syndrome coronavirus 2 (SARS-CoV-2) (Coronavirus disease [COVID-19]) vaccine , 2021/3/25 17:52 , Malik Garcia (YAS)

## 2021-03-25 NOTE — DISCHARGE NOTE PROVIDER - DETAILS OF MALNUTRITION DIAGNOSIS/DIAGNOSES
This patient has been assessed with a concern for Malnutrition and was treated during this hospitalization for the following Nutrition diagnosis/diagnoses:     -  03/23/2021: Severe protein-calorie malnutrition   -  03/23/2021: Underweight (BMI < 19)   This patient has been assessed with a concern for Malnutrition and was treated during this hospitalization for the following Nutrition diagnosis/diagnoses:     -  03/23/2021: Severe protein-calorie malnutrition   -  03/23/2021: Underweight (BMI < 19)    This patient has been assessed with a concern for Malnutrition and was treated during this hospitalization for the following Nutrition diagnosis/diagnoses:     -  03/23/2021: Severe protein-calorie malnutrition   -  03/23/2021: Underweight (BMI < 19)   This patient has been assessed with a concern for Malnutrition and was treated during this hospitalization for the following Nutrition diagnosis/diagnoses:     -  03/23/2021: Severe protein-calorie malnutrition   -  03/23/2021: Underweight (BMI < 19)    This patient has been assessed with a concern for Malnutrition and was treated during this hospitalization for the following Nutrition diagnosis/diagnoses:     -  03/23/2021: Severe protein-calorie malnutrition   -  03/23/2021: Underweight (BMI < 19)    This patient has been assessed with a concern for Malnutrition and was treated during this hospitalization for the following Nutrition diagnosis/diagnoses:     -  03/23/2021: Severe protein-calorie malnutrition   -  03/23/2021: Underweight (BMI < 19)   This patient has been assessed with a concern for Malnutrition and was treated during this hospitalization for the following Nutrition diagnosis/diagnoses:     -  03/23/2021: Severe protein-calorie malnutrition   -  03/23/2021: Underweight (BMI < 19)    This patient has been assessed with a concern for Malnutrition and was treated during this hospitalization for the following Nutrition diagnosis/diagnoses:     -  03/23/2021: Severe protein-calorie malnutrition   -  03/23/2021: Underweight (BMI < 19)    This patient has been assessed with a concern for Malnutrition and was treated during this hospitalization for the following Nutrition diagnosis/diagnoses:     -  03/23/2021: Severe protein-calorie malnutrition   -  03/23/2021: Underweight (BMI < 19)    This patient has been assessed with a concern for Malnutrition and was treated during this hospitalization for the following Nutrition diagnosis/diagnoses:     -  03/23/2021: Severe protein-calorie malnutrition   -  03/23/2021: Underweight (BMI < 19)

## 2021-03-25 NOTE — PROGRESS NOTE ADULT - REASON FOR ADMISSION
post menopausal bleeding
Vaginal bleeding
post menopausal bleeding

## 2021-03-25 NOTE — DISCHARGE NOTE PROVIDER - CARE PROVIDER_API CALL
Enriqueta Jnoes)  Internal Medicine  [address] [facility] [facility] [address], [address]  [place] [address]  Phone: [phone]  Fax: [phone]  Established Patient  Follow Up Time: 1-3 days

## 2021-03-25 NOTE — PROGRESS NOTE ADULT - PROBLEM SELECTOR PLAN 5
- DVT ppx: SCDs in setting of bleeding  - Diet: Regular diet  - PT eval - home, outpatient PT. Has rolling walker.

## 2021-03-25 NOTE — DISCHARGE NOTE PROVIDER - NSDCMRMEDTOKEN_GEN_ALL_CORE_FT
cyanocobalamin 1000 mcg oral tablet: 1 tab(s) orally once a day  diazePAM 5 mg oral tablet: 1 tab(s) orally 2 times a day MDD:10 mg  folic acid 1 mg oral tablet: 1 tab(s) orally once a day  ipratropium-albuterol 0.5 mg-2.5 mg/3 mLinhalation solution: 3 milliliter(s) inhaled every 6 hours, As needed, Shortness of Breath and/or Wheezing  Multiple Vitamins oral tablet: 1 tab(s) orally once a day  OLANZapine 5 mg oral tablet: 1 tab(s) orally once a day (at bedtime) MDD:5 mg  thiamine 100 mg oral tablet: 1 tab(s) orally once a day

## 2021-03-25 NOTE — DISCHARGE NOTE PROVIDER - NSFOLLOWUPCLINICSTOKEN_GEN_ALL_ED_FT
917175:1 month;678078:1 month; 527057:1-3 days;069534:1 month;363033:1-3 days;286449:1 month;930439:1-3 days;177668:1-3 days;

## 2021-03-25 NOTE — PROGRESS NOTE ADULT - NUTRITIONAL ASSESSMENT
This patient has been assessed with a concern for Malnutrition and has been determined to have a diagnosis/diagnoses of Severe protein-calorie malnutrition and Underweight (BMI < 19).    This patient is being managed with:   Diet Regular-  Supplement Feeding Modality:  Oral  Ensure Enlive Cans or Servings Per Day:  2       Frequency:  Two Times a day  Entered: Mar 22 2021  9:25PM    
This patient has been assessed with a concern for Malnutrition and has been determined to have a diagnosis/diagnoses of Severe protein-calorie malnutrition and Underweight (BMI < 19).    This patient is being managed with:   Diet Regular-  Supplement Feeding Modality:  Oral  Ensure Enlive Cans or Servings Per Day:  2       Frequency:  Two Times a day  Entered: Mar 22 2021  9:25PM

## 2021-03-25 NOTE — CHART NOTE - NSCHARTNOTEFT_GEN_A_CORE
66 yo F w/ PMHx of COPD (not on home O2 or BIPAP), gastric and duodenal ulcers (H. pylori negative), lumbar spinal stenosis s/p laminectomy (2017), anxiety, history of alcohol and benzodiazepine abuse w/ recent admission 02/10 - 02/16 for benzodiazepine withdrawal admitted for concerns of vaginal bleeding. Neurology consulted for concerns of auditory hallucinations overnight. Neurologic examination reveals deficits in attention with tangential thought process and confabulation. History also reveals significant weight loss (Feb ED note reveals 49kg, currently 41kg- 20lb wt loss) over the past month with relatively new onset urinary and fecal incontinence and poor dietary regimen. Differential includes toxic/metabolic encephalopathy secondary to poor dietary/vitamin intake (e.g. Wernicke's encephalopathy). MR head consistent with mild to moderate microvascular disease. Routine EEG revealed mild nonspecific slowing, non-contributory to the patient's clinical exam. Currently improved as per primary team, pending other consultant recommendations prior to discharge. Awaiting B1 levels, would recommend continuing thiamine regimen. Discussed with primary team and in agreement with plan.    Recommendations:   [ ] Thiamine 500mg BID  [ ] f/u B1 level    Neurology signing off at this time. Please reconsult if further concerns arise.

## 2021-03-25 NOTE — DISCHARGE NOTE PROVIDER - NSDCCPCAREPLAN_GEN_ALL_CORE_FT
PRINCIPAL DISCHARGE DIAGNOSIS  Diagnosis: Blood loss  Assessment and Plan of Treatment: You came into the ED after noticing blood on your pad. In the hospital, you were fully evaluated to determine the source of the bleeding but the gastrointestinal, urological, and gynecological evaluations were negative. Because of this, you can be evaluated further on an outpatient basis. Please return to the ED if you notice any alarming signs, such as additional bleeding, feeling cold or clammy, or feeling lightheaded.      SECONDARY DISCHARGE DIAGNOSES  Diagnosis: Failure to thrive in adult  Assessment and Plan of Treatment: When you came to the ED, you mentioned that you had lost 30 lbs over the past two months. No identifiable reasons were found during your Please continue to monitor your weight at home and contact your PCP if you notice further decline in weight.     PRINCIPAL DISCHARGE DIAGNOSIS  Diagnosis: Blood loss  Assessment and Plan of Treatment: You came into the ED after noticing blood on your pad. In the hospital, you were fully evaluated to determine the source of the bleeding but the the stomach doctors, urine doctoers, and gynecologists evaluated you and do not feel there was any signifiucant blood loss from any of these areas. You CT scan was also benign in regards to bleeding. Because of this, you can be evaluated further in the clinics with your doctors when you leave the hospital. Please return to the ED if you notice any alarming signs, such as additional bleeding, feeling cold or clammy, or feeling lightheaded.      SECONDARY DISCHARGE DIAGNOSES  Diagnosis: Bladder wall thickening  Assessment and Plan of Treatment: On your CT scan, there was some evidence of bladder thickening. This needs to be further evaluated by the urine docotrs. A referral will be provided to you please schedule an appointment within 1-3 days,    Diagnosis: Hallucinations  Assessment and Plan of Treatment: During your hospital stay, you had some auditory hallucinations. As such, you were prescribed a medication to help with your symptoms. It is important that you continue to take these medications at home and follow up with a psychiatrist when you elave the hospital. Please return to the ED if you have auditory or other forms of hallucinations or confusion.    Diagnosis: Weight loss  Assessment and Plan of Treatment: When you came to the ED, you mentioned that you had lost 30 lbs over the past two months. No identifiable reasons were found during your Please continue to monitor your weight at home and contact your PCP if you notice further decline in weight.

## 2021-03-25 NOTE — DISCHARGE NOTE PROVIDER - NSFOLLOWUPCLINICS_GEN_ALL_ED_FT
[facility] Psychiatry  Psychiatry  [address] [place] [address]  Phone: [phone]  Fax:   Follow Up Time: 1 month    [facility] Gynecology and Obstetrics  Gynceology/OB  [address] [place] [address]  Phone: [phone]  Fax:   Follow Up Time: 1 month     [facility]  Urology  [address], [address]  [place] [address]  Phone: [phone]  Fax:   Follow Up Time: 1-3 days    [facility] Gynecology and Obstetrics  Gynceology/OB  [address] [place] [address]  Phone: [phone]  Fax:   Follow Up Time: 1 month    [facility] Specialty Clin[facility]  Urology  [address] [place] [address]  Phone: [phone]  Fax:   Follow Up Time: 1-3 days    [facility] Psychiatry  Psychiatry  [address] [place] [address]  Phone: [phone]  Fax:   Follow Up Time: 1 month    [facility]  Gastroenterology  [address] [place] [address]  Phone: [phone]  Fax:   Follow Up Time: 1-3 days    [facility] Gastroenterology  Gastroenterology  [address] [facility] [address]  [place] [address]  Phone: [phone]  Fax:   Follow Up Time: 1-3 days

## 2021-03-25 NOTE — DISCHARGE NOTE NURSING/CASE MANAGEMENT/SOCIAL WORK - PATIENT PORTAL LINK FT
You can access the FollowMyHealth Patient Portal offered by [facility] by registering at the following website: [email or web address]. By joining [employer]’s FollowMyHealth portal, you will also be able to view your health information using other applications (apps) compatible with our system.

## 2021-03-25 NOTE — PROGRESS NOTE ADULT - PROBLEM SELECTOR PLAN 2
- pt acutely altered 3/21 in pm, has been having issues with paranoia and hallucinations at home for year prior to admission  - c/f cocaine use? vs benzo withdrawal vs paraneoplastic disease (40 lb weight loss)  - neuro - rec MRI - unclear if patient will be able to tolerate exam  - psychiatric consult - valium BID and olanzapine  - utox neg  - B12/folate/thiamine   - heavy metal screen - collected  - TSH wnl

## 2021-03-25 NOTE — PROGRESS NOTE ADULT - PROBLEM SELECTOR PLAN 1
- unclear bleeding source - Hb now stable  - CT A/P with urogram grossly normal  - TVUS - dermoids, atrophic endometrium  - GI eval, urology eval, gyn eval  - do not suspect gyn source at this time  - hold off VTE ppx i/s/o possible bleed

## 2021-03-25 NOTE — CHART NOTE - NSCHARTNOTEFT_GEN_A_CORE
CT urogram images and read reviewed.  No clear urothelial mass.  No further workup inpatient, will need to follow up.  She should be instructed to call [phone] to schedule an appointment after discharge.

## 2021-03-25 NOTE — PROGRESS NOTE ADULT - ATTENDING COMMENTS
Bleeding- evaluated by GYN, , GI- imaging unremarkable- cystoscopy as outpatient  Hallucinations/concern for benzo withdrawal- resolved- cont Valium 5 mg PO BID  Discharge to home with outpatient PT today- 45 minutes spent discharging the patient

## 2021-03-25 NOTE — DISCHARGE NOTE PROVIDER - NSDCCPTREATMENT_GEN_ALL_CORE_FT
PRINCIPAL PROCEDURE  Procedure: MRI head  Findings and Treatment: Impression: Mild to moderate chronic microvascular ischemic disease. No abnormal intracranial enhancement      SECONDARY PROCEDURE  Procedure: Abdomen CT  Findings and Treatment: IMPRESSION:  *  No active GI bleed.  *  Mildly delayed left nephrogram with mild left hydroureteronephrosis. Thickening of the urothelium with haziness in the adjacent fat appreciated at the left extrarenal pelvis and left ureter throughout most of its course including the left ureterovesical junction. No definitive obstructing calculus is identified. Cannot exclude a recently passed stone. Cannot exclude inflammation/infection in the collecting system i.e. pyelitis and ureteritis. Cannot exclude an infiltrating urothelial cancerous process. Further evaluation is warranted with urologic consultation.  *  Small right dermoid.

## 2021-03-25 NOTE — PROGRESS NOTE ADULT - SUBJECTIVE AND OBJECTIVE BOX
London Santos MD  Internal Medicine Resident  [phone]    PATIENT:  JUWAN OBANDO  [id number]    CHIEF COMPLAINT:  Patient is a 65y old  Female who presents with a chief complaint of Vaginal bleeding (24 Mar 2021 09:33)    INTERVAL HISTORY/OVERNIGHT EVENTS:  - overnight no events  - feels better after starting Valium  - oriented today  - willing to get MRI, other workup as necessary  - felt happy visiting with son  - rad monteiro  - feels very hungry this morning, eating her Georgian toast very briskly.  - slept well    MEDICATIONS:  MEDICATIONS  (STANDING):  cyanocobalamin 1000 MICROGram(s) Oral daily  diazepam    Tablet 5 milliGRAM(s) Oral two times a day  folic acid 1 milliGRAM(s) Oral daily  multivitamin 1 Tablet(s) Oral daily  OLANZapine 5 milliGRAM(s) Oral at bedtime  thiamine IVPB 500 milliGRAM(s) IV Intermittent every 8 hours    MEDICATIONS  (PRN):  albuterol/ipratropium for Nebulization 3 milliLiter(s) Nebulizer every 6 hours PRN Shortness of Breath and/or Wheezing    ALLERGIES:  Allergies    Bananas (Vomiting; Diarrhea)  latex (Rash)  Levaquin (Other (Moderate))    Intolerances    OBJECTIVE:  T(C): 36.7 (24 Mar 2021 05:03), Max: 37.2 (23 Mar 2021 12:05)  T(F): 98 (24 Mar 2021 05:03), Max: 99 (23 Mar 2021 12:05)  HR: 77 (24 Mar 2021 05:03) (71 - 77)  BP: 111/74 (24 Mar 2021 05:03) (111/74 - 138/82)  RR: 17 (24 Mar 2021 05:03) (17 - 18)  SpO2: 97% (24 Mar 2021 05:03) (96% - 98%)    I&O's Summary  23 Mar 2021 07:01  -  24 Mar 2021 07:00  --------------------------------------------------------  IN: 1410 mL / OUT: 925 mL / NET: 485 mL    PHYSICAL EXAMINATION:  General: WN/WD NAD, frail appearing, cane at bedside  HEENT: PERRL, EOMI, moist mucous membranes  Neurology: A&O to person place, time, RAMOS x 4, ambulatory with minimal assistance, eating, coordinated fork to mouth completely normally  Respiratory: CTA B/L, normal respiratory effort, no wheezes, crackles, rales  CV: RRR, S1S2, no murmurs, rubs or gallops  Abdominal: Soft, NT, ND +BS  : no blood on pelvic digital exam, external genitalia wnl, no perineal anesthesia  Extremities: No edema, +peripheral pulses      LABS:                        10.2   7.20  )-----------( 432      ( 24 Mar 2021 07:01 )             32.5     03-24    141  |  104  |  13  ----------------------------<  100<H>  3.2<L>   |  26  |  0.76    Ca    8.7      24 Mar 2021 07:02  Phos  3.0     03-24  Mg     1.7     03-24    TPro  5.6<L>  /  Alb  2.9<L>  /  TBili  0.1<L>  /  DBili  x   /  AST  15  /  ALT  17  /  AlkPhos  48  03-24    LIVER FUNCTIONS - ( 24 Mar 2021 07:02 )  Alb: 2.9 g/dL / Pro: 5.6 g/dL / ALK PHOS: 48 U/L / ALT: 17 U/L / AST: 15 U/L / GGT: x           PT/INR - ( 24 Mar 2021 07:01 )   PT: 11.1 sec;   INR: 0.92 ratio    PTT - ( 24 Mar 2021 07:01 )  PTT:27.9 sec    Urinalysis Basic - ( 23 Mar 2021 21:47 )    Color: Light Yellow / Appearance: Clear / S.018 / pH: x  Gluc: x / Ketone: Negative  / Bili: Negative / Urobili: Negative   Blood: x / Protein: Negative / Nitrite: Negative   Leuk Esterase: Negative / RBC: 5 /hpf / WBC 2 /HPF   Sq Epi: x / Non Sq Epi: 1 /hpf / Bacteria: Negative London Santos MD  Internal Medicine Resident  [phone]    Da Boykin, MS4  [phone]    PATIENT:  JUWAN OBANDO  [id number]    CHIEF COMPLAINT:  Patient is a 65y old  Female who presents with a chief complaint of Vaginal bleeding (24 Mar 2021 09:33)    INTERVAL HISTORY/OVERNIGHT EVENTS:  - overnight no events  - feels better after starting Valium  - oriented today  - felt happy visiting with son  - denies pain  - slept well    MEDICATIONS:  MEDICATIONS  (STANDING):  cyanocobalamin 1000 MICROGram(s) Oral daily  diazepam    Tablet 5 milliGRAM(s) Oral two times a day  folic acid 1 milliGRAM(s) Oral daily  multivitamin 1 Tablet(s) Oral daily  OLANZapine 5 milliGRAM(s) Oral at bedtime  thiamine IVPB 500 milliGRAM(s) IV Intermittent every 8 hours    MEDICATIONS  (PRN):  albuterol/ipratropium for Nebulization 3 milliLiter(s) Nebulizer every 6 hours PRN Shortness of Breath and/or Wheezing    ALLERGIES:  Allergies    Bananas (Vomiting; Diarrhea)  latex (Rash)  Levaquin (Other (Moderate))    Intolerances    OBJECTIVE:  ICU Vital Signs Last 24 Hrs  T(C): 37 (25 Mar 2021 12:01), Max: 37 (25 Mar 2021 12:01)  T(F): 98.6 (25 Mar 2021 12:01), Max: 98.6 (25 Mar 2021 12:01)  HR: 71 (25 Mar 2021 12:01) (71 - 80)  BP: 102/66 (25 Mar 2021 12:01) (102/66 - 119/85)  RR: 19 (25 Mar 2021 12:01) (18 - 19)  SpO2: 99% (25 Mar 2021 12:01) (98% - 99%)    I&O's Summary    24 Mar 2021 07:01  -  25 Mar 2021 07:00  --------------------------------------------------------  IN: 840 mL / OUT: 0 mL / NET: 840 mL      PHYSICAL EXAMINATION:  General: WN/WD NAD, frail appearing, cane at bedside  HEENT: PERRL, EOMI, moist mucous membranes  Neurology: A&O to person place, time, RAMOS x 4, ambulatory with minimal assistance, eating, coordinated fork to mouth completely normally  Respiratory: CTA B/L, normal respiratory effort, no wheezes, crackles, rales  CV: RRR, S1S2, no murmurs, rubs or gallops  Abdominal: Soft, NT, ND +BS  : no blood on pelvic digital exam, external genitalia wnl, no perineal anesthesia  Extremities: No edema, +peripheral pulses      LABS:                       10.8   7.34  )-----------( 480      ( 25 Mar 2021 07:39 )             35.1     03-25    143  |  106  |  20  ----------------------------<  85  4.2   |  26  |  0.94    Ca    8.9      25 Mar 2021 07:39  Phos  3.1     03-25  Mg     1.8     -25    TPro  5.6<L>  /  Alb  3.0<L>  /  TBili  0.2  /  DBili  x   /  AST  15  /  ALT  14  /  AlkPhos  42  03-25      03-24    141  |  104  |  13  ----------------------------<  100<H>  3.2<L>   |  26  |  0.76    Ca    8.7      24 Mar 2021 07:02  Phos  3.0     03-24  Mg     1.7     -24    TPro  5.6<L>  /  Alb  2.9<L>  /  TBili  0.1<L>  /  DBili  x   /  AST  15  /  ALT  17  /  AlkPhos  48  03-24    LIVER FUNCTIONS - ( 24 Mar 2021 07:02 )  Alb: 2.9 g/dL / Pro: 5.6 g/dL / ALK PHOS: 48 U/L / ALT: 17 U/L / AST: 15 U/L / GGT: x           PT/INR - ( 24 Mar 2021 07:01 )   PT: 11.1 sec;   INR: 0.92 ratio    PTT - ( 24 Mar 2021 07:01 )  PTT:27.9 sec    Urinalysis Basic - ( 23 Mar 2021 21:47 )    Color: Light Yellow / Appearance: Clear / S.018 / pH: x  Gluc: x / Ketone: Negative  / Bili: Negative / Urobili: Negative   Blood: x / Protein: Negative / Nitrite: Negative   Leuk Esterase: Negative / RBC: 5 /hpf / WBC 2 /HPF   Sq Epi: x / Non Sq Epi: 1 /hpf / Bacteria: Negative    < from: MR Head w/wo IV Cont (21 @ 17:45) >  IMPRESSION:    Mild to moderate chronic microvascular ischemic disease. No abnormal intracranial enhancement.    < end of copied text >  < from: CT Abdomen and Pelvis w/ IV Cont (21 @ 20:02) >    IMPRESSION:  *  No active GI bleed.  *  Mildly delayed left nephrogram with mild left hydroureteronephrosis. Thickening of the urothelium with haziness in the adjacent fat appreciated at the left extrarenal pelvis and left ureter throughout most of its course including the left ureterovesical junction. No definitive obstructing calculus is identified. Cannot exclude a recently passed stone. Cannot exclude inflammation/infection in the collecting system i.e. pyelitis and ureteritis. Cannot exclude an infiltrating urothelial cancerous process. Further evaluation is warranted with urologic consultation.  *  Small right dermoid.    < end of copied text >

## 2021-03-26 LAB
ARSENIC BLD-MCNC: 6 UG/L — SIGNIFICANT CHANGE UP (ref 2–23)
CADMIUM SERPL-MCNC: 0.8 UG/L — SIGNIFICANT CHANGE UP (ref 0–1.2)
LEAD BLD-MCNC: <1 UG/DL — SIGNIFICANT CHANGE UP (ref 0–4)
MERCURY SERPL-MCNC: <1 UG/L — SIGNIFICANT CHANGE UP (ref 0–14.9)

## 2021-03-29 LAB — VIT B1 SERPL-MCNC: 246.2 NMOL/L — HIGH (ref 66.5–200)

## 2021-04-05 ENCOUNTER — APPOINTMENT (OUTPATIENT)
Dept: INTERNAL MEDICINE | Facility: CLINIC | Age: 66
End: 2021-04-05
Payer: MEDICARE

## 2021-04-05 DIAGNOSIS — Z09 ENCOUNTER FOR FOLLOW-UP EXAMINATION AFTER COMPLETED TREATMENT FOR CONDITIONS OTHER THAN MALIGNANT NEOPLASM: ICD-10-CM

## 2021-04-05 PROCEDURE — 99214 OFFICE O/P EST MOD 30 MIN: CPT | Mod: 95

## 2021-04-20 PROBLEM — Z09 HOSPITAL DISCHARGE FOLLOW-UP: Status: ACTIVE | Noted: 2021-04-20

## 2021-04-20 RX ORDER — AMOXICILLIN AND CLAVULANATE POTASSIUM 875; 125 MG/1; MG/1
875-125 TABLET, COATED ORAL TWICE DAILY
Qty: 20 | Refills: 0 | Status: DISCONTINUED | COMMUNITY
Start: 2020-03-21 | End: 2021-04-20

## 2021-04-20 RX ORDER — HYDROMORPHONE HYDROCHLORIDE 4 MG/1
4 TABLET ORAL
Qty: 14 | Refills: 0 | Status: DISCONTINUED | COMMUNITY
Start: 2017-01-31 | End: 2021-04-20

## 2021-04-20 RX ORDER — DIAZEPAM 5 MG/1
5 TABLET ORAL TWICE DAILY
Qty: 28 | Refills: 0 | Status: DISCONTINUED | COMMUNITY
Start: 2019-04-12 | End: 2021-04-20

## 2021-04-20 RX ORDER — TIZANIDINE 4 MG/1
4 TABLET ORAL
Qty: 30 | Refills: 0 | Status: DISCONTINUED | COMMUNITY
Start: 2018-08-21 | End: 2021-04-20

## 2021-04-20 RX ORDER — NITROFURANTOIN (MONOHYDRATE/MACROCRYSTALS) 25; 75 MG/1; MG/1
100 CAPSULE ORAL TWICE DAILY
Qty: 10 | Refills: 0 | Status: DISCONTINUED | COMMUNITY
Start: 2021-01-20 | End: 2021-04-20

## 2021-04-20 NOTE — HISTORY OF PRESENT ILLNESS
[Post-hospitalization from ___ Hospital] : Post-hospitalization from [unfilled] Hospital [Home] : at home, [unfilled] , at the time of the visit. [Medical Office: ([facility])___] : at the medical office located in  [Verbal consent obtained from patient] : the patient, [unfilled] [FreeTextEntry2] : 66 yo female with h/o as below including COPD, anxiety/depression, benzo use with recent hospitalization for suspected benzo withdrawal with hallucinations/paranoia that improved after resumption of benzos, here for telehealth f/up visit after hospitalization for ?vaginal bleeding (bleeding in pad/diaper) of ?etiology, also with reportedly significant weight loss and urinary/fecal incontinence over past several months.  Accompanied by son (HCP).  Lost to f/up over past few years.  During hospitalization, Hgb 10.8, platelets 480, nl CMP except decreased protein/albumin, CXR nl, had w/up for vaginal bleeding with vaginal exam, pelvic US showing b/l small ovarian lesions likely dermoids, atrophic endometrium, CT A/P negative for GI bleed, found mild left hydroureteronephrosis and thickening of urothelium (advised  for further eval), CRP 1.05 elevated.  During hospitalization had recurrent hallucinations, seen by psych and put on olanazapine and diazepam with improvement, MRI brain nl.  \par Since d/c, no further bleeding.  Hasn't been taking olanzapine, just using diazepam 2 mg prn.\par Feeling very overwhelmed with everything that was going on in hospital.

## 2021-04-20 NOTE — PHYSICAL EXAM
[No Acute Distress] : no acute distress [Well Developed] : well developed [Well Nourished] : well nourished [Well-Appearing] : well-appearing [Supple] : supple [No Respiratory Distress] : no respiratory distress  [No Accessory Muscle Use] : no accessory muscle use [No Rash] : no rash [No Focal Deficits] : no focal deficits [de-identified] : does answer questions appropriately

## 2021-04-20 NOTE — REVIEW OF SYSTEMS
[Recent Change In Weight] : ~T recent weight change [Negative] : Heme/Lymph [FreeTextEntry7] : see hpi - fecal incontinence [FreeTextEntry8] : see hpi [de-identified] : see hpi

## 2021-04-20 NOTE — ASSESSMENT
[FreeTextEntry1] : 66 yo female with h/o as above including tobacco use, COPD, anxiety/depression/benzo use, chronic pain, with recent hospitalization for hallucinations/paranoia in setting of benzo withdrawal, also with weight loss, urinary and fecal incontinence of unclear etiology (didn't seek care), here for telehealth f/up visit for new  vs. gyn vs. GI bleeding of ?etiology.  During telehealth visit, became very overwhelmed/anxious while discussing issues during hospitalization so visit ended prematurely.  \par 1.   - advised son to make f/up  appt to f/up abnl on CT, possible cause of bleeding\par 2.  Psych - suspect underlying psychiatric disorder as repeated episodes of hallucinations/paranoia, not adherent currently with olanazapine, referred to psych for eval/treatment, will need to collaborate with psych to determine capacity to make medical decisions as may be affecting care of other underlying undiagnosed conditions\par 3.  Neuro - will likely need neuro eval at some point for urinary/fecal incontinence of ?etiology (unless due to psych disorder)\par 4.  Endo - significant weight loss concerning, initial w/up in hospital unrevealing but will need to r/o other causes\par 5.  Gyn - will likely need gyn eval at some point if  w/up negative and has recurrent bleeding\par 6.  Further plan for f/up based on initial  and psych eval, will connect with son on progress

## 2021-06-23 NOTE — PATIENT PROFILE ADULT - ..
Patient called  She stated that she has not received her lab orders yet  I verified her address to which she added the address should be: (specifically):  Line 1: [address] 2: [address]  Reprinted lab orders and re-mailed as specified  11-Feb-2021 16:23:16

## 2021-06-27 ENCOUNTER — INPATIENT (INPATIENT)
Facility: HOSPITAL | Age: 66
LOS: 3 days | Discharge: HOME HEALTH | End: 2021-07-01
Attending: INTERNAL MEDICINE | Admitting: INTERNAL MEDICINE
Payer: MEDICARE

## 2021-06-27 VITALS
TEMPERATURE: 98 F | HEIGHT: 64 IN | RESPIRATION RATE: 16 BRPM | HEART RATE: 82 BPM | SYSTOLIC BLOOD PRESSURE: 112 MMHG | DIASTOLIC BLOOD PRESSURE: 75 MMHG | OXYGEN SATURATION: 100 %

## 2021-06-27 DIAGNOSIS — Z98.890 OTHER SPECIFIED POSTPROCEDURAL STATES: Chronic | ICD-10-CM

## 2021-06-27 DIAGNOSIS — Z82.8 FAMILY HISTORY OF OTHER DISABILITIES AND CHRONIC DISEASES LEADING TO DISABLEMENT, NOT ELSEWHERE CLASSIFIED: Chronic | ICD-10-CM

## 2021-06-27 DIAGNOSIS — R55 SYNCOPE AND COLLAPSE: ICD-10-CM

## 2021-06-27 LAB
ALBUMIN SERPL ELPH-MCNC: 3.6 G/DL — SIGNIFICANT CHANGE UP (ref 3.3–5)
ALP SERPL-CCNC: 40 U/L — SIGNIFICANT CHANGE UP (ref 40–120)
ALT FLD-CCNC: 9 U/L — SIGNIFICANT CHANGE UP (ref 4–33)
ANION GAP SERPL CALC-SCNC: 9 MMOL/L — SIGNIFICANT CHANGE UP (ref 7–14)
AST SERPL-CCNC: 16 U/L — SIGNIFICANT CHANGE UP (ref 4–32)
BASOPHILS # BLD AUTO: 0.04 K/UL — SIGNIFICANT CHANGE UP (ref 0–0.2)
BASOPHILS NFR BLD AUTO: 0.5 % — SIGNIFICANT CHANGE UP (ref 0–2)
BILIRUB SERPL-MCNC: <0.2 MG/DL — SIGNIFICANT CHANGE UP (ref 0.2–1.2)
BUN SERPL-MCNC: 17 MG/DL — SIGNIFICANT CHANGE UP (ref 7–23)
CALCIUM SERPL-MCNC: 8.5 MG/DL — SIGNIFICANT CHANGE UP (ref 8.4–10.5)
CHLORIDE SERPL-SCNC: 106 MMOL/L — SIGNIFICANT CHANGE UP (ref 98–107)
CO2 SERPL-SCNC: 24 MMOL/L — SIGNIFICANT CHANGE UP (ref 22–31)
CREAT SERPL-MCNC: 0.84 MG/DL — SIGNIFICANT CHANGE UP (ref 0.5–1.3)
EOSINOPHIL # BLD AUTO: 0.02 K/UL — SIGNIFICANT CHANGE UP (ref 0–0.5)
EOSINOPHIL NFR BLD AUTO: 0.2 % — SIGNIFICANT CHANGE UP (ref 0–6)
GLUCOSE SERPL-MCNC: 90 MG/DL — SIGNIFICANT CHANGE UP (ref 70–99)
HCT VFR BLD CALC: 32.5 % — LOW (ref 34.5–45)
HGB BLD-MCNC: 10.3 G/DL — LOW (ref 11.5–15.5)
IANC: 5.82 K/UL — SIGNIFICANT CHANGE UP (ref 1.5–8.5)
IMM GRANULOCYTES NFR BLD AUTO: 0.9 % — SIGNIFICANT CHANGE UP (ref 0–1.5)
LYMPHOCYTES # BLD AUTO: 1.61 K/UL — SIGNIFICANT CHANGE UP (ref 1–3.3)
LYMPHOCYTES # BLD AUTO: 20 % — SIGNIFICANT CHANGE UP (ref 13–44)
MAGNESIUM SERPL-MCNC: 2.1 MG/DL — SIGNIFICANT CHANGE UP (ref 1.6–2.6)
MCHC RBC-ENTMCNC: 31.3 PG — SIGNIFICANT CHANGE UP (ref 27–34)
MCHC RBC-ENTMCNC: 31.7 GM/DL — LOW (ref 32–36)
MCV RBC AUTO: 98.8 FL — SIGNIFICANT CHANGE UP (ref 80–100)
MONOCYTES # BLD AUTO: 0.51 K/UL — SIGNIFICANT CHANGE UP (ref 0–0.9)
MONOCYTES NFR BLD AUTO: 6.3 % — SIGNIFICANT CHANGE UP (ref 2–14)
NEUTROPHILS # BLD AUTO: 5.82 K/UL — SIGNIFICANT CHANGE UP (ref 1.8–7.4)
NEUTROPHILS NFR BLD AUTO: 72.1 % — SIGNIFICANT CHANGE UP (ref 43–77)
NRBC # BLD: 0 /100 WBCS — SIGNIFICANT CHANGE UP
NRBC # FLD: 0 K/UL — SIGNIFICANT CHANGE UP
PLATELET # BLD AUTO: 302 K/UL — SIGNIFICANT CHANGE UP (ref 150–400)
POTASSIUM SERPL-MCNC: 4.3 MMOL/L — SIGNIFICANT CHANGE UP (ref 3.5–5.3)
POTASSIUM SERPL-SCNC: 4.3 MMOL/L — SIGNIFICANT CHANGE UP (ref 3.5–5.3)
PROT SERPL-MCNC: 5.6 G/DL — LOW (ref 6–8.3)
RBC # BLD: 3.29 M/UL — LOW (ref 3.8–5.2)
RBC # FLD: 13.2 % — SIGNIFICANT CHANGE UP (ref 10.3–14.5)
SARS-COV-2 RNA SPEC QL NAA+PROBE: SIGNIFICANT CHANGE UP
SODIUM SERPL-SCNC: 139 MMOL/L — SIGNIFICANT CHANGE UP (ref 135–145)
TROPONIN T, HIGH SENSITIVITY RESULT: 24 NG/L — SIGNIFICANT CHANGE UP
WBC # BLD: 8.07 K/UL — SIGNIFICANT CHANGE UP (ref 3.8–10.5)
WBC # FLD AUTO: 8.07 K/UL — SIGNIFICANT CHANGE UP (ref 3.8–10.5)

## 2021-06-27 PROCEDURE — 71045 X-RAY EXAM CHEST 1 VIEW: CPT | Mod: 26

## 2021-06-27 PROCEDURE — 99285 EMERGENCY DEPT VISIT HI MDM: CPT

## 2021-06-27 RX ORDER — SODIUM CHLORIDE 9 MG/ML
1000 INJECTION INTRAMUSCULAR; INTRAVENOUS; SUBCUTANEOUS ONCE
Refills: 0 | Status: COMPLETED | OUTPATIENT
Start: 2021-06-27 | End: 2021-06-27

## 2021-06-27 RX ADMIN — SODIUM CHLORIDE 1000 MILLILITER(S): 9 INJECTION INTRAMUSCULAR; INTRAVENOUS; SUBCUTANEOUS at 18:32

## 2021-06-27 NOTE — ED PROVIDER NOTE - ATTENDING CONTRIBUTION TO CARE
Attending Attestation: Dr. Mendoza  I have personally performed a history and physical examination of the patient and discussed management with the resident as well as the patient.  I reviewed the resident's note and agree with the documented findings and plan of care.  I have authored and modified critical sections of the Provider Note, including but not limited to HPI, Physical Exam and MDM. 65 yr old woman presents after a apparent syncopal episode with mild lightheadedness prior to event but with no recent illnesses or precipitating event. Pt appears well on exam with no evidence of traumatic injuries to evaluate. No neurologic deficits to suggest intra-cranial abnormalities. Unclear etiology for syncope, possibly cardiogenic vs vasovagal (was hypotensive on scene, but not in ED). Will check blood work, ua, cxr to evaluate for metabolic/infectious etiology of syncope. Will keep on telemetry monitoring and admit for further cardiac evaluation.

## 2021-06-27 NOTE — ED ADULT NURSE NOTE - OBJECTIVE STATEMENT
Facilitator RN- Pt received to TR-a s/p syncopal episode  while in the shower. Pt states her  and son heard an "thump" and found pt sitting against shower wall. Pt denies feeling dizzy prior to episode and denies any pain, no obvious signs of injury or trauma noted. Labs sent and NS infusing, pt breathing with ease on room air and eval in progress.

## 2021-06-27 NOTE — ED PROVIDER NOTE - CLINICAL SUMMARY MEDICAL DECISION MAKING FREE TEXT BOX
66yo woman presents after a syncopal episode with mild lightheadedness prior to event but with no recent illnesses or precipitating event. Pt appears well on exam with no evidence of traumatic injuries to evaluate. No neurologic deficits to suggest intra-cranial abnormalities. Low suspicion for ACS with no symptoms or ischemic changes on EKG. Will check blood work, ua, cxr to evaluate for metabolic/infectious etiology of syncope. Will keep on telemetry monitoring and admit for further cardiac evaluation. 65 yr old woman presents after a apparent syncopal episode with mild lightheadedness prior to event but with no recent illnesses or precipitating event. Pt appears well on exam with no evidence of traumatic injuries to evaluate. No neurologic deficits to suggest intra-cranial abnormalities. Unclear etiology for syncope, possibly cardiogenic vs vasovagal (was hypotensive on scene, but not in ED). Will check blood work, ua, cxr to evaluate for metabolic/infectious etiology of syncope. Will keep on telemetry monitoring and admit for further cardiac evaluation.

## 2021-06-27 NOTE — ED ADULT TRIAGE NOTE - PRO INTERPRETER NEED 2
English no loss of consciousness, no gait abnormality, no headache, no sensory deficits, and no weakness.

## 2021-06-27 NOTE — ED PROVIDER NOTE - OBJECTIVE STATEMENT
66yo woman PMH anxiety, ulcers, laminectomy presents after a syncopal episode with fall in the bathtub. Pt states she was found sitting down in the bathtub and does not remember events leading up to fall. States she remembers feeling lightheaded but does not remember if she was sitting or standing before passing out. She denies headache, n/v, vision changes, focal weakness in arms or legs. No neck pain or back pain, no pain in extremity joints. No recent fevers, cough, n/v/d, abdominal pain, or dysuria.  Spoke with pt's son, Donal, who states that he heard a loud noise in the bathroom and was at her side in about 5 seconds and was awake but slightly incoherent but talking and took about 1 minute for her to return to her baseline. They did not notice any shaking or seizure like activities. 64yo woman PMH anxiety, ulcers, laminectomy presents after a syncopal episode with fall in the bathtub. Pt states she was found sitting down in the bathtub and does not remember events leading up to fall. States she remembers feeling lightheaded but does not remember if she was sitting or standing before passing out. When found was seated on shower chair. She denies headache, n/v, vision changes, focal weakness in arms or legs. No neck pain or back pain, no pain in extremity joints. No recent fevers, cough, n/v/d, abdominal pain, or dysuria. Spoke with pt's son, Donal, who states that he heard a loud noise in the bathroom and was at her side in about 5 seconds and was awake but slightly incoherent but talking and took about 1 minute for her to return to her baseline. They did not notice any shaking or seizure like activities.    Pt appears somewhat confused during eval.  Of note, pt's family report she's been confused over last few months and have been undergoing cognitive eval.

## 2021-06-27 NOTE — ED PROVIDER NOTE - CADM POA URETHRAL CATHETER
Received call back, let her know we got the faxes. She then asked if we had gotten the results back from the labs drawn yesterday. Let her know we would call her as soon as Dr. Patti Vogel has a chance to review the results.
No

## 2021-06-27 NOTE — ED ADULT TRIAGE NOTE - CHIEF COMPLAINT QUOTE
Pt presents to ED for syncope in shower. As per EMS pt takes sitting showers and  heard a thud and found her leaning on the bathroom wall. Pt denies injury to head. 18G IV observed to left hand. As per EMS they performed orthostatics out in field and found patient to be hypotensive- 70/50, pt currently normotensive. Denies pain/ symptoms at this time.

## 2021-06-27 NOTE — ED ADULT NURSE REASSESSMENT NOTE - NS ED NURSE REASSESS COMMENT FT1
Pt received from day RN Tali. Pt A&Ox4. Pt resting comfortably in bed at this time. Pt currently not endorsing any complaints. Respirations equal and unlabored, no acute distress noted. Denies chest pain, palpitations, SOB, headaches, dizziness, weakness. Cardiac monitor in place, NSR noted. Vital signs as noted, comfort measures provided, call bell within reach. Assessment ongoing.

## 2021-06-27 NOTE — ED PROVIDER NOTE - PROGRESS NOTE DETAILS
Enriqueta Cooley, resident MD: pt has superficial skin tear on left arm after taking off ekg sticker and was bandaged. no gross abnormalities on blood work. will admit for further evaluation for syncope. pt and pt's family aware of plan.

## 2021-06-27 NOTE — ED ADULT NURSE NOTE - ED STAT RN HANDOFF DETAILS
Report given to [facility] RN Batista. Pt A&Ox4. Pt resting comfortably in bed. Respriations equal and unlabored, no acute distress noted. Pt transported to [facility].

## 2021-06-27 NOTE — ED ADULT NURSE NOTE - NSIMPLEMENTINTERV_GEN_ALL_ED
Implemented All Fall Risk Interventions:  [place] to call system. Call bell, personal items and telephone within reach. Instruct patient to call for assistance. Room bathroom lighting operational. Non-slip footwear when patient is off stretcher. Physically safe environment: no spills, clutter or unnecessary equipment. Stretcher in lowest position, wheels locked, appropriate side rails in place. Provide visual cue, wrist band, yellow gown, etc. Monitor gait and stability. Monitor for mental status changes and reorient to person, place, and time. Review medications for side effects contributing to fall risk. Reinforce activity limits and safety measures with patient and family.

## 2021-06-27 NOTE — ED PROVIDER NOTE - PHYSICAL EXAMINATION
General: well-appearing woman in no acute distress  Head: normocephalic, atraumatic. no skull depressions or tenderness to palpation  Eyes: PERRL, EOMI  Mouth: moist mucous membranes, no oral lesions  Neck: supple neck  CV: normal rate and rhythm, peripheral pulses 2+ bilateral UE and LE  Respiratory: clear to auscultation bilaterally  Abdomen: soft, nontender, nondistended  : no CVAT  MSK: no Cspine tenderness to palpation, no midline tenderness to palpation, full active ROM of all extremity joints with no tenderness to palpation, no pelvis instability or tenderness  Neuro: alert and oriented x3, CN III-XII intact, speech clear, no dysmetria, no pronator drift, strength 5/5 UE and LE bilaterally  Skin: no abrasions or ecchymosis noted  Extremities: full ROM, no tenderness to palpation of joints General: well-appearing woman in no acute distress  Head: normocephalic, atraumatic. no skull depressions or tenderness to palpation  Eyes: PERRL, EOMI  Mouth: moist mucous membranes, no oral lesions  Neck: supple neck  CV: normal rate and rhythm, peripheral pulses 2+ bilateral UE and LE  Respiratory: clear to auscultation bilaterally  Abdomen: soft, nontender, nondistended  : no CVAT  MSK: no Cspine tenderness to palpation, no midline tenderness to palpation, full active ROM of all extremity joints with no tenderness to palpation, no pelvis instability or tenderness  Neuro: alert and oriented x3, CN III-XII intact, speech clear, no dysmetria, no pronator drift, strength 5/5 UE and LE bilaterally  Skin: no abrasions or ecchymosis noted, well healed midline scar on lumbar spine  Extremities: full ROM, no tenderness to palpation of joints

## 2021-06-27 NOTE — ED PROVIDER NOTE - NS ED ROS FT
General: no fever, +syncope  Head: no headache  Eyes: no vision change  ENT: no nasal discharge/congestion, no sore throat  CV: no chest pain  Resp: no SOB, no cough  GI: no N/V/D, no abdominal pain  : no dysuria  MSK: no joint pain, no neck pain, no back pain  Skin: no new rash  Neuro: no focal weakness, no change in sensation

## 2021-06-28 DIAGNOSIS — R32 UNSPECIFIED URINARY INCONTINENCE: ICD-10-CM

## 2021-06-28 DIAGNOSIS — F41.9 ANXIETY DISORDER, UNSPECIFIED: ICD-10-CM

## 2021-06-28 DIAGNOSIS — Z29.9 ENCOUNTER FOR PROPHYLACTIC MEASURES, UNSPECIFIED: ICD-10-CM

## 2021-06-28 DIAGNOSIS — R55 SYNCOPE AND COLLAPSE: ICD-10-CM

## 2021-06-28 DIAGNOSIS — D64.9 ANEMIA, UNSPECIFIED: ICD-10-CM

## 2021-06-28 LAB
FERRITIN SERPL-MCNC: 106 NG/ML — SIGNIFICANT CHANGE UP (ref 15–150)
IRON SATN MFR SERPL: 30 % — SIGNIFICANT CHANGE UP (ref 14–50)
IRON SATN MFR SERPL: 85 UG/DL — SIGNIFICANT CHANGE UP (ref 30–160)
RBC # BLD: 3.67 M/UL — LOW (ref 3.8–5.2)
RETICS #: 47.3 K/UL — SIGNIFICANT CHANGE UP (ref 25–125)
RETICS/RBC NFR: 1.3 % — SIGNIFICANT CHANGE UP (ref 0.5–2.5)
TIBC SERPL-MCNC: 281 UG/DL — SIGNIFICANT CHANGE UP (ref 220–430)
TROPONIN T, HIGH SENSITIVITY RESULT: 21 NG/L — SIGNIFICANT CHANGE UP
UIBC SERPL-MCNC: 196 UG/DL — SIGNIFICANT CHANGE UP (ref 110–370)

## 2021-06-28 PROCEDURE — 99223 1ST HOSP IP/OBS HIGH 75: CPT | Mod: GC

## 2021-06-28 PROCEDURE — 12345: CPT | Mod: NC

## 2021-06-28 PROCEDURE — 70450 CT HEAD/BRAIN W/O DYE: CPT | Mod: 26

## 2021-06-28 RX ORDER — SODIUM CHLORIDE 9 MG/ML
3 INJECTION INTRAMUSCULAR; INTRAVENOUS; SUBCUTANEOUS EVERY 8 HOURS
Refills: 0 | Status: DISCONTINUED | OUTPATIENT
Start: 2021-06-28 | End: 2021-07-01

## 2021-06-28 RX ORDER — SODIUM CHLORIDE 9 MG/ML
1000 INJECTION INTRAMUSCULAR; INTRAVENOUS; SUBCUTANEOUS
Refills: 0 | Status: DISCONTINUED | OUTPATIENT
Start: 2021-06-28 | End: 2021-07-01

## 2021-06-28 RX ORDER — ALBUTEROL 90 UG/1
2 AEROSOL, METERED ORAL EVERY 6 HOURS
Refills: 0 | Status: DISCONTINUED | OUTPATIENT
Start: 2021-06-28 | End: 2021-07-01

## 2021-06-28 RX ADMIN — SODIUM CHLORIDE 3 MILLILITER(S): 9 INJECTION INTRAMUSCULAR; INTRAVENOUS; SUBCUTANEOUS at 04:34

## 2021-06-28 RX ADMIN — SODIUM CHLORIDE 3 MILLILITER(S): 9 INJECTION INTRAMUSCULAR; INTRAVENOUS; SUBCUTANEOUS at 13:24

## 2021-06-28 RX ADMIN — SODIUM CHLORIDE 75 MILLILITER(S): 9 INJECTION INTRAMUSCULAR; INTRAVENOUS; SUBCUTANEOUS at 16:20

## 2021-06-28 RX ADMIN — SODIUM CHLORIDE 3 MILLILITER(S): 9 INJECTION INTRAMUSCULAR; INTRAVENOUS; SUBCUTANEOUS at 21:02

## 2021-06-28 NOTE — PROGRESS NOTE ADULT - PROBLEM SELECTOR PLAN 5
Will order Venodyne. Will hold off on starting pharmacological anticoagulation until CT results. SCD for now.    Dispo- Pending cardiac w/u PT eval.

## 2021-06-28 NOTE — H&P ADULT - HISTORY OF PRESENT ILLNESS
66 y/o F with PMH of anxiety, COPD, GERD, laminectomy  presents to the ED after syncopal episode. Patient states she was in shower when she began to feel lightheaded. Patient states that she does not remember what happened next. Patient states her  found her leaning on wall of shower.  called for collateral but was unable to reached. Spoke to son Yann who states that he and his father heard a thud. Father rushed into bathroom and found that patient had fell over. Father placed patient on shower chair and then bed. Son states that patient was coherent and that per patient's  there was no seizure like activity.  66 y/o F with PMH of anxiety, COPD, GERD, laminectomy  presents to the ED after syncopal episode. Patient states she was in shower when she began to feel lightheaded. Patient states that she does not remember what happened next. Patient states her  found her leaning on wall of shower.  called for collateral but was unable to reached. Spoke to son Yann who states that he and his father heard a thud. Father rushed into bathroom and found that patient had fell over. Father placed patient on shower chair and then bed. Son states that patient was coherent and that per patient's  there was no seizure like activity. Per son patient back to baseline quickly.  Per son patient is not on any medications currently. Patient denies any headache or head trauma. Patient denies, nausea, vomiting chest pain, shortness of breath, numbness, weakness or history of syncope.

## 2021-06-28 NOTE — H&P ADULT - ATTENDING COMMENTS
64 y/o F with PMH of anxiety, COPD, GERD, laminectomy  presents to the ED after syncopal episode. Pt unable to recall events prior to syncope, +LOC with likely head trauma. States she was in the shower, possibly vasovagal or orthostatic syncope. Lower suspicion for structural cardiac path given normal EKG however TTE pending. No focal deficits noted on exam. CT head pending to r/o trauma. PT cordell.

## 2021-06-28 NOTE — PROGRESS NOTE ADULT - PROBLEM SELECTOR PLAN 4
Pt reports baseline urinary incontinence, uses diapers   Patient reports no UOP since presenting to the ED  Will order bladder scan to further eval States she voiding w/o issues. Chronic incontinence. F/u urology as OP.

## 2021-06-28 NOTE — PROGRESS NOTE ADULT - PROBLEM SELECTOR PLAN 1
Concerning for vasovagal syncope however cannot rule out cardiac causes   EKG NSR, no acute changes   Echo ordered.  Orthostatics ordered.  Fall and aspiration precautions ordered.  Unclear if patient had head trauma. Patient denying headache. To skull or cervical spine tenderness.  Will order CT Head, CT cervical spine.  No neuro deficits on exam, low suspicion for CVA  PT consult ordered. Concerning for vasovagal syncope however cannot rule out cardiac causes   EKG NSR, no acute changes   Echo pending  Orthostatics SBP: 170 -> 159. Dry on exam - will give IVF.  No neuro deficits on exam, low suspicion for CVA. CTH neg. F/u CT neck.  PT consult ordered.

## 2021-06-28 NOTE — H&P ADULT - PROBLEM SELECTOR PLAN 2
Patient with Hemoglobin of 10.3.  Patient with chronically low hemoglobin.  Reticulocyte count, Ferritin ,TIBC and iron ordered.  Continue to trend.

## 2021-06-28 NOTE — H&P ADULT - PROBLEM SELECTOR PLAN 5
Will order Venodyne. Will hold ff on starting pharmacological anticoagulation until CT results. Will order Venodyne. Will hold off on starting pharmacological anticoagulation until CT results.

## 2021-06-28 NOTE — PROGRESS NOTE ADULT - PROBLEM SELECTOR PLAN 3
Patient reports that she is no longer on medication,.  Continue to trend. Hx of BZD abuse per chart review. Not on meds. Would avoid use of meds for sx if possible.  Continue to trend.

## 2021-06-28 NOTE — H&P ADULT - NEUROLOGICAL DETAILS
[Patient] : patient
alert and oriented x 3/responds to verbal commands/sensation intact/cranial nerves intact/normal strength

## 2021-06-28 NOTE — PROGRESS NOTE ADULT - PROBLEM SELECTOR PLAN 2
Patient with Hemoglobin of 10.3.  Patient with chronically low hemoglobin.  Reticulocyte count, Ferritin ,TIBC and iron ordered.  Continue to trend. Patient with Hemoglobin of 10.3.  Patient with chronically low hemoglobin.  Iron studies normal. Retic index <1.   Given hx of ETOH abuse -check B12/folate also.  Follow up as OP.

## 2021-06-28 NOTE — H&P ADULT - MUSCULOSKELETAL
detailed exam details… ROM intact/no joint erythema/no joint warmth/no calf tenderness/normal strength

## 2021-06-28 NOTE — H&P ADULT - PROBLEM SELECTOR PLAN 4
Patient reports not urinating since coming to ED.   Patient also reports having urinary incontinence.  Will order bladder scan. Patient reports not urinating since coming to ED.   Patient also reports having urinary incontinence at home.  Will order bladder scan. Pt reports baseline urinary incontinence, uses diapers   Patient reports no UOP since presenting to the ED  Will order bladder scan to further eval

## 2021-06-28 NOTE — H&P ADULT - PROBLEM SELECTOR PLAN 1
Admit to tele, continue monitoring.  Echo ordered.  Orthostatics ordered.  Fall and aspiration precautions ordered.  Unclear if patient had head trauma. Patient denying headache. To skull or cervical spine tenderness.  Will order CT Head.  PT consult ordered. Admit to tele, continue monitoring.  Echo ordered.  Orthostatics ordered.  Fall and aspiration precautions ordered.  Unclear if patient had head trauma. Patient denying headache. To skull or cervical spine tenderness.  Will order CT Head, CT cervical spine.  PT consult ordered. Concerning for vasovagal syncope however cannot rule out cardiac causes   EKG NSR, no acute changes   Echo ordered.  Orthostatics ordered.  Fall and aspiration precautions ordered.  Unclear if patient had head trauma. Patient denying headache. To skull or cervical spine tenderness.  Will order CT Head, CT cervical spine.  No neuro deficits on exam, low suspicion for CVA  PT consult ordered.

## 2021-06-29 DIAGNOSIS — R41.0 DISORIENTATION, UNSPECIFIED: ICD-10-CM

## 2021-06-29 LAB
ALBUMIN SERPL ELPH-MCNC: 4.2 G/DL — SIGNIFICANT CHANGE UP (ref 3.3–5)
ALP SERPL-CCNC: 47 U/L — SIGNIFICANT CHANGE UP (ref 40–120)
ALT FLD-CCNC: 10 U/L — SIGNIFICANT CHANGE UP (ref 4–33)
ANION GAP SERPL CALC-SCNC: 15 MMOL/L — HIGH (ref 7–14)
APPEARANCE UR: CLEAR — SIGNIFICANT CHANGE UP
AST SERPL-CCNC: 17 U/L — SIGNIFICANT CHANGE UP (ref 4–32)
BASOPHILS # BLD AUTO: 0.04 K/UL — SIGNIFICANT CHANGE UP (ref 0–0.2)
BASOPHILS NFR BLD AUTO: 0.5 % — SIGNIFICANT CHANGE UP (ref 0–2)
BILIRUB SERPL-MCNC: 0.6 MG/DL — SIGNIFICANT CHANGE UP (ref 0.2–1.2)
BILIRUB UR-MCNC: NEGATIVE — SIGNIFICANT CHANGE UP
BUN SERPL-MCNC: 11 MG/DL — SIGNIFICANT CHANGE UP (ref 7–23)
CALCIUM SERPL-MCNC: 9.8 MG/DL — SIGNIFICANT CHANGE UP (ref 8.4–10.5)
CHLORIDE SERPL-SCNC: 102 MMOL/L — SIGNIFICANT CHANGE UP (ref 98–107)
CO2 SERPL-SCNC: 20 MMOL/L — LOW (ref 22–31)
COLOR SPEC: YELLOW — SIGNIFICANT CHANGE UP
CREAT SERPL-MCNC: 0.76 MG/DL — SIGNIFICANT CHANGE UP (ref 0.5–1.3)
DIFF PNL FLD: NEGATIVE — SIGNIFICANT CHANGE UP
EOSINOPHIL # BLD AUTO: 0.03 K/UL — SIGNIFICANT CHANGE UP (ref 0–0.5)
EOSINOPHIL NFR BLD AUTO: 0.4 % — SIGNIFICANT CHANGE UP (ref 0–6)
GLUCOSE SERPL-MCNC: 93 MG/DL — SIGNIFICANT CHANGE UP (ref 70–99)
GLUCOSE UR QL: NEGATIVE — SIGNIFICANT CHANGE UP
HCT VFR BLD CALC: 37.4 % — SIGNIFICANT CHANGE UP (ref 34.5–45)
HGB BLD-MCNC: 12.1 G/DL — SIGNIFICANT CHANGE UP (ref 11.5–15.5)
IANC: 4.58 K/UL — SIGNIFICANT CHANGE UP (ref 1.5–8.5)
IMM GRANULOCYTES NFR BLD AUTO: 0.4 % — SIGNIFICANT CHANGE UP (ref 0–1.5)
KETONES UR-MCNC: NEGATIVE — SIGNIFICANT CHANGE UP
LEUKOCYTE ESTERASE UR-ACNC: NEGATIVE — SIGNIFICANT CHANGE UP
LYMPHOCYTES # BLD AUTO: 2.13 K/UL — SIGNIFICANT CHANGE UP (ref 1–3.3)
LYMPHOCYTES # BLD AUTO: 28.7 % — SIGNIFICANT CHANGE UP (ref 13–44)
MCHC RBC-ENTMCNC: 31.3 PG — SIGNIFICANT CHANGE UP (ref 27–34)
MCHC RBC-ENTMCNC: 32.4 GM/DL — SIGNIFICANT CHANGE UP (ref 32–36)
MCV RBC AUTO: 96.6 FL — SIGNIFICANT CHANGE UP (ref 80–100)
MONOCYTES # BLD AUTO: 0.61 K/UL — SIGNIFICANT CHANGE UP (ref 0–0.9)
MONOCYTES NFR BLD AUTO: 8.2 % — SIGNIFICANT CHANGE UP (ref 2–14)
NEUTROPHILS # BLD AUTO: 4.58 K/UL — SIGNIFICANT CHANGE UP (ref 1.8–7.4)
NEUTROPHILS NFR BLD AUTO: 61.8 % — SIGNIFICANT CHANGE UP (ref 43–77)
NITRITE UR-MCNC: NEGATIVE — SIGNIFICANT CHANGE UP
NRBC # BLD: 0 /100 WBCS — SIGNIFICANT CHANGE UP
NRBC # FLD: 0 K/UL — SIGNIFICANT CHANGE UP
PH UR: 6.5 — SIGNIFICANT CHANGE UP (ref 5–8)
PLATELET # BLD AUTO: 356 K/UL — SIGNIFICANT CHANGE UP (ref 150–400)
POTASSIUM SERPL-MCNC: 3.8 MMOL/L — SIGNIFICANT CHANGE UP (ref 3.5–5.3)
POTASSIUM SERPL-SCNC: 3.8 MMOL/L — SIGNIFICANT CHANGE UP (ref 3.5–5.3)
PROT SERPL-MCNC: 7.1 G/DL — SIGNIFICANT CHANGE UP (ref 6–8.3)
PROT UR-MCNC: ABNORMAL
RBC # BLD: 3.87 M/UL — SIGNIFICANT CHANGE UP (ref 3.8–5.2)
RBC # FLD: 12.9 % — SIGNIFICANT CHANGE UP (ref 10.3–14.5)
SODIUM SERPL-SCNC: 137 MMOL/L — SIGNIFICANT CHANGE UP (ref 135–145)
SP GR SPEC: 1.02 — SIGNIFICANT CHANGE UP (ref 1.01–1.02)
TSH SERPL-MCNC: 1.14 UIU/ML — SIGNIFICANT CHANGE UP (ref 0.27–4.2)
UROBILINOGEN FLD QL: SIGNIFICANT CHANGE UP
WBC # BLD: 7.42 K/UL — SIGNIFICANT CHANGE UP (ref 3.8–10.5)
WBC # FLD AUTO: 7.42 K/UL — SIGNIFICANT CHANGE UP (ref 3.8–10.5)

## 2021-06-29 PROCEDURE — 99233 SBSQ HOSP IP/OBS HIGH 50: CPT

## 2021-06-29 PROCEDURE — 72125 CT NECK SPINE W/O DYE: CPT | Mod: 26

## 2021-06-29 PROCEDURE — 93306 TTE W/DOPPLER COMPLETE: CPT | Mod: 26

## 2021-06-29 RX ORDER — LANOLIN ALCOHOL/MO/W.PET/CERES
6 CREAM (GRAM) TOPICAL AT BEDTIME
Refills: 0 | Status: DISCONTINUED | OUTPATIENT
Start: 2021-06-29 | End: 2021-07-01

## 2021-06-29 RX ADMIN — SODIUM CHLORIDE 3 MILLILITER(S): 9 INJECTION INTRAMUSCULAR; INTRAVENOUS; SUBCUTANEOUS at 21:15

## 2021-06-29 RX ADMIN — SODIUM CHLORIDE 3 MILLILITER(S): 9 INJECTION INTRAMUSCULAR; INTRAVENOUS; SUBCUTANEOUS at 13:04

## 2021-06-29 RX ADMIN — Medication 6 MILLIGRAM(S): at 21:19

## 2021-06-29 RX ADMIN — SODIUM CHLORIDE 3 MILLILITER(S): 9 INJECTION INTRAMUSCULAR; INTRAVENOUS; SUBCUTANEOUS at 06:02

## 2021-06-29 NOTE — PROGRESS NOTE ADULT - PROBLEM SELECTOR PLAN 2
Patient with Hemoglobin of 10.3.  Patient with chronically low hemoglobin.  Iron studies normal. Retic index <1.   Given hx of ETOH abuse could be marrow related.  Follow up as OP. -No issues noted at time of admission. Pt had been lucid and coherent. However, overnight, pt noted to be more confused as per RN. This was not appreciated on exam in the AM. Hence suspect, possible sundowning? On re-eval this afternoon - pt was noted to be quite paranoid and disoriented. She did not know where she was. Pt also states that the  were called on her by the RNs and that they were waiting for her outside.  -Reviewed past psych eval. She has a known hx of depression, anxiety. Hx of ETOH/BZD abuse as well. Last seen by psych @ [facility] in Mar 2021. I spoke w/ son, pt has not followed with OP psych since 2020 (her usual psychiatrist retired). She is not on any psych meds at home, including benzos. She did not continue taking Zyprexa since her DC in March. She also has not had any ETOH. No report of AH/VH. This was confirmed w/ her son.   -Check UA/CXR to r/o acute infectious process - though lower suspicion at this time.  -Check UTox, ETOH level, B12, folate, TSH to r/o toxic metabolic etiologies.   -Will also c/s psychiatry for further input if above workup is negative.

## 2021-06-29 NOTE — PROGRESS NOTE ADULT - PROBLEM SELECTOR PLAN 5
SCD for now.    Dispo- Pending ECHO/CT - if negative - pt can go home w/ OP follow up.     Communication:  Spoke w/ son Yann this afternoon. He endorsed concerns re: patient's mental health over the past year. Her hospitalization in March is noted and reviewed. He expressed concerns re: pt's confusion. During assessment pt was completely with it AAOx4. Understood plan of care and on board. Will reassess this afternoon. States she voiding w/o issues. Chronic incontinence. F/u urology as OP.

## 2021-06-29 NOTE — PROGRESS NOTE ADULT - PROBLEM SELECTOR PLAN 3
Hx of BZD abuse per chart review. Not on meds. Would avoid use of meds for sx if possible.  BP trends on higher side - pt attributes to her anxiety. Discussed meds - she states she won't take it.

## 2021-06-29 NOTE — PHYSICAL THERAPY INITIAL EVALUATION ADULT - LEVEL OF INDEPENDENCE: STAND/SIT, REHAB EVAL
Patient voluntarily stood up, impulsive at times; requested patient to return to bed due to awaiting CT scan of the spine; bed alarm engaged/supervision

## 2021-06-29 NOTE — PROGRESS NOTE ADULT - PROBLEM SELECTOR PLAN 4
States she voiding w/o issues. Chronic incontinence. F/u urology as OP. Stable.   Iron studies normal. Retic index <1.   Given hx of ETOH abuse could be marrow related.  Follow up as OP.

## 2021-06-30 LAB
ANION GAP SERPL CALC-SCNC: 13 MMOL/L — SIGNIFICANT CHANGE UP (ref 7–14)
BUN SERPL-MCNC: 19 MG/DL — SIGNIFICANT CHANGE UP (ref 7–23)
CALCIUM SERPL-MCNC: 9.9 MG/DL — SIGNIFICANT CHANGE UP (ref 8.4–10.5)
CHLORIDE SERPL-SCNC: 101 MMOL/L — SIGNIFICANT CHANGE UP (ref 98–107)
CO2 SERPL-SCNC: 23 MMOL/L — SIGNIFICANT CHANGE UP (ref 22–31)
CREAT SERPL-MCNC: 0.95 MG/DL — SIGNIFICANT CHANGE UP (ref 0.5–1.3)
FOLATE SERPL-MCNC: 15 NG/ML — SIGNIFICANT CHANGE UP (ref 3.1–17.5)
GLUCOSE SERPL-MCNC: 97 MG/DL — SIGNIFICANT CHANGE UP (ref 70–99)
HCT VFR BLD CALC: 36.7 % — SIGNIFICANT CHANGE UP (ref 34.5–45)
HGB BLD-MCNC: 12 G/DL — SIGNIFICANT CHANGE UP (ref 11.5–15.5)
MAGNESIUM SERPL-MCNC: 2.3 MG/DL — SIGNIFICANT CHANGE UP (ref 1.6–2.6)
MCHC RBC-ENTMCNC: 31.2 PG — SIGNIFICANT CHANGE UP (ref 27–34)
MCHC RBC-ENTMCNC: 32.7 GM/DL — SIGNIFICANT CHANGE UP (ref 32–36)
MCV RBC AUTO: 95.3 FL — SIGNIFICANT CHANGE UP (ref 80–100)
NRBC # BLD: 0 /100 WBCS — SIGNIFICANT CHANGE UP
NRBC # FLD: 0 K/UL — SIGNIFICANT CHANGE UP
PHOSPHATE SERPL-MCNC: 3.7 MG/DL — SIGNIFICANT CHANGE UP (ref 2.5–4.5)
PLATELET # BLD AUTO: 337 K/UL — SIGNIFICANT CHANGE UP (ref 150–400)
POTASSIUM SERPL-MCNC: 3.6 MMOL/L — SIGNIFICANT CHANGE UP (ref 3.5–5.3)
POTASSIUM SERPL-SCNC: 3.6 MMOL/L — SIGNIFICANT CHANGE UP (ref 3.5–5.3)
RBC # BLD: 3.85 M/UL — SIGNIFICANT CHANGE UP (ref 3.8–5.2)
RBC # FLD: 13.1 % — SIGNIFICANT CHANGE UP (ref 10.3–14.5)
SODIUM SERPL-SCNC: 137 MMOL/L — SIGNIFICANT CHANGE UP (ref 135–145)
VIT B12 SERPL-MCNC: 621 PG/ML — SIGNIFICANT CHANGE UP (ref 200–900)
WBC # BLD: 5.94 K/UL — SIGNIFICANT CHANGE UP (ref 3.8–10.5)
WBC # FLD AUTO: 5.94 K/UL — SIGNIFICANT CHANGE UP (ref 3.8–10.5)

## 2021-06-30 PROCEDURE — 99232 SBSQ HOSP IP/OBS MODERATE 35: CPT

## 2021-06-30 PROCEDURE — 99223 1ST HOSP IP/OBS HIGH 75: CPT

## 2021-06-30 RX ADMIN — SODIUM CHLORIDE 3 MILLILITER(S): 9 INJECTION INTRAMUSCULAR; INTRAVENOUS; SUBCUTANEOUS at 22:05

## 2021-06-30 RX ADMIN — Medication 6 MILLIGRAM(S): at 21:24

## 2021-06-30 RX ADMIN — SODIUM CHLORIDE 3 MILLILITER(S): 9 INJECTION INTRAMUSCULAR; INTRAVENOUS; SUBCUTANEOUS at 12:54

## 2021-06-30 RX ADMIN — SODIUM CHLORIDE 3 MILLILITER(S): 9 INJECTION INTRAMUSCULAR; INTRAVENOUS; SUBCUTANEOUS at 05:18

## 2021-06-30 RX ADMIN — Medication 1 TABLET(S): at 12:27

## 2021-06-30 NOTE — PROGRESS NOTE ADULT - PROBLEM SELECTOR PLAN 4
Stable.   Iron studies normal. Retic index <1. B12, folate normal.  Given hx of ETOH abuse could be marrow related.  Follow up as OP.

## 2021-06-30 NOTE — BH CONSULTATION LIAISON ASSESSMENT NOTE - PRIOR MEDICATION SIDE EFFECTS OR ADVERSE REACTIONS
Airway patent, Nasal mucosa clear. Mouth with normal mucosa. Throat has no vesicles, no oropharyngeal exudates and uvula is midline. None known

## 2021-06-30 NOTE — BH CONSULTATION LIAISON ASSESSMENT NOTE - CURRENT MEDICATION
MEDICATIONS  (STANDING):  melatonin 6 milliGRAM(s) Oral at bedtime  multivitamin 1 Tablet(s) Oral daily  sodium chloride 0.9% lock flush 3 milliLiter(s) IV Push every 8 hours  sodium chloride 0.9%. 1000 milliLiter(s) (75 mL/Hr) IV Continuous <Continuous>    MEDICATIONS  (PRN):  ALBUTerol    90 MICROgram(s) HFA Inhaler 2 Puff(s) Inhalation every 6 hours PRN Shortness of Breath

## 2021-06-30 NOTE — BH CONSULTATION LIAISON ASSESSMENT NOTE - SUMMARY
66 y/o F with PMH of anxiety, COPD, GERD, laminectomy  presented to the ED on 6/27 after syncopal episode in the shower. Pt has a past hx of anxiety and depression and stopped seeing her outpatient psychiatrist in March and discontinued taking her Zyprexa as she feels she no longer needs it.     The PC team gave the pt 6mg of Melatonin at bedtime on 6/29. The team was then concerned about the pt's increasing confusion and paranoia in the PM. Pt remembers being agitated and recounts that a neighbor kept turning off her TV and the lights. Pt recognizes that this story seems improbable and could have been a dream like state induced by the medication. Pt's delirium has cleared. Pt is A&O X 4,  cooperative and engaging, with a grossly normal MSE. She is calm and in no apparent distress. Pt is not extrapolating from her strange sensorial experience in the PM and has no paranoia, AH/VH, SI, or HI.     Recommendations:   - Pt is cleared by Psychiatry as her episode of delirium appears to be short-lived and fully resolved.   - The pt does not require a sitter as she is a low risk  - Continue delirium protocol  - Provide pt with outpatient Psychiatry recommendations.  64 y/o F with PMH of anxiety, COPD, GERD, laminectomy  presented to the ED on 6/27 after syncopal episode in the shower. Pt has a past hx of anxiety and depression and stopped seeing her outpatient psychiatrist in March and discontinued taking her Zyprexa as she feels she no longer needs it.     The PC team gave the pt 6mg of Melatonin at bedtime on 6/29. The team was then concerned about the pt's increasing confusion and paranoia in the PM. Pt remembers being agitated and recounts that a neighbor kept turning off her TV and the lights. Pt recognizes that this story seems improbable and could have been a dream like state induced by the medication. Pt's delirium has cleared. Pt is A&O X 4,  cooperative and engaging, with a grossly normal MSE. She is calm and in no apparent distress. Pt is not extrapolating from her strange sensorial experience in the PM and has no paranoia, AH/VH, SI, or HI.     Recommendations:   - Pt is cleared by Psychiatry as her episode of delirium appears to be short-lived and fully resolved.   - The pt does not require a sitter as she is a low risk  - Continue delirium protocol (avoid benzodiazepines, anticholinergic/antihistaminic agents; address underlying medical concerns, etc)  - Can provide pt with outpatient Psychiatry recommendations. [facility] Outpatient services: [phone]

## 2021-06-30 NOTE — PROGRESS NOTE ADULT - PROBLEM SELECTOR PLAN 1
-No AH/VH/SI/HI.   -UA appears negative. B12/folate/TSH WNL. No other metabolic issues.  -UTox is pending.  -Last seen by OP psych in 2020. Not currently on meds (note that she was on Zyprexa while hospitalized in March, but never continued it).   -Await psych input.

## 2021-06-30 NOTE — BH CONSULTATION LIAISON ASSESSMENT NOTE - CASE SUMMARY
Chart reviewed. Pt seen with trainee. Currently AA O x 3, calm, in no distress. Recalls having difficulty sleeping for some days, then took Melatonin and afterwards had perceptual disturbances involving TV, felt very real, but currently not experiencing it or any distress. Slept well thereafter. Denies worrisome psych sx of SI, hopelessness, AVH, or paranoia. Does not meet criteria for inpatient psych admission or sitter, and lacks psych contraindications to discharge when medically cleared. Agree with above recs as above. Will sign off but please call if questions arise.

## 2021-06-30 NOTE — BH CONSULTATION LIAISON ASSESSMENT NOTE - ADDITIONAL DETAILS / COMMENTS
Pt appreciated that her confusion from the PM could have been a dream like state induced by the medication.

## 2021-06-30 NOTE — BH CONSULTATION LIAISON ASSESSMENT NOTE - NSBHCHARTREVIEWVS_PSY_A_CORE FT
Vital Signs Last 24 Hrs  T(C): 36.7 (30 Jun 2021 10:00), Max: 36.8 (29 Jun 2021 17:48)  T(F): 98.1 (30 Jun 2021 10:00), Max: 98.3 (29 Jun 2021 17:48)  HR: 73 (30 Jun 2021 10:00) (73 - 89)  BP: 115/63 (30 Jun 2021 10:00) (115/63 - 136/93)  BP(mean): --  RR: 16 (30 Jun 2021 10:00) (16 - 17)  SpO2: 100% (30 Jun 2021 10:00) (98% - 100%)

## 2021-06-30 NOTE — BH CONSULTATION LIAISON ASSESSMENT NOTE - NSBHCHARTREVIEWLAB_PSY_A_CORE FT
CBC Full  -  ( 2021 07:28 )  WBC Count : 5.94 K/uL  RBC Count : 3.85 M/uL  Hemoglobin : 12.0 g/dL  Hematocrit : 36.7 %  Platelet Count - Automated : 337 K/uL  Mean Cell Volume : 95.3 fL  Mean Cell Hemoglobin : 31.2 pg  Mean Cell Hemoglobin Concentration : 32.7 gm/dL  Auto Neutrophil # : x  Auto Lymphocyte # : x  Auto Monocyte # : x  Auto Eosinophil # : x  Auto Basophil # : x  Auto Neutrophil % : x  Auto Lymphocyte % : x  Auto Monocyte % : x  Auto Eosinophil % : x  Auto Basophil % : x        137  |  101  |  19  ----------------------------<  97  3.6   |  23  |  0.95    Ca    9.9      2021 07:28  Phos  3.7     06  Mg     2.30         TPro  7.1  /  Alb  4.2  /  TBili  0.6  /  DBili  x   /  AST  17  /  ALT  10  /  AlkPhos  47      LIVER FUNCTIONS - ( 2021 06:40 )  Alb: 4.2 g/dL / Pro: 7.1 g/dL / ALK PHOS: 47 U/L / ALT: 10 U/L / AST: 17 U/L / GGT: x           Urinalysis Basic - ( 2021 14:46 )    Color: Yellow / Appearance: Clear / S.023 / pH: x  Gluc: x / Ketone: Negative  / Bili: Negative / Urobili: <2 mg/dL   Blood: x / Protein: Trace / Nitrite: Negative   Leuk Esterase: Negative / RBC: x / WBC x   Sq Epi: x / Non Sq Epi: x / Bacteria: x

## 2021-06-30 NOTE — BH CONSULTATION LIAISON ASSESSMENT NOTE - NSCOMMENTSUICRISKFACT_PSY_ALL_CORE
Pt does not appear to be distressed by her incidence of confusion and retained a calm demeanor throughout the interview.

## 2021-06-30 NOTE — BH CONSULTATION LIAISON ASSESSMENT NOTE - HPI (INCLUDE ILLNESS QUALITY, SEVERITY, DURATION, TIMING, CONTEXT, MODIFYING FACTORS, ASSOCIATED SIGNS AND SYMPTOMS)
66 y/o F with PMH of anxiety, COPD, GERD, laminectomy  presented to the ED on 6/27 after syncopal episode. Patient states she was in shower when she began to feel lightheaded. Her  rushed into bathroom and found that patient had fell over. Son states that patient was coherent, with no seizure like activity and returned to baseline quickly.  Per son patient is not on any medications currently.     Pt has a past hx of anxiety and depression. She stopped seeing her outpatient psychiatrist in March when the doctor retired and discontinued taking her Zyprexa as the pt feels she no longer needs.     The PC team gave the pt 6mg of Melatonin at bedtime on 6/29. The team was then concerned about the pt's increasing confusion and agitation in the PM. Pt remembers being agitated and states that a neighbor kept turning off her TV and the lights. Pt recognizes that this story seems improbable and could have been a dream like state induced by the medication. Pt is A&O X 4,  cooperative and engaging, with a grossly normal MSE. She is calm and in no apparent distress. Pt is not extrapolating from her strange sensorial experience in the PM and has no paranoia, AH/VH, SI, or HI.     Pt states that her eating has been inconsistent and reports good sleep with the Melatonin.

## 2021-06-30 NOTE — PROGRESS NOTE ADULT - PROBLEM SELECTOR PLAN 2
No new episodes. ECHO showed no acute findings. CTH and CT neck negative.  PT recs appreciated - home, no skilled needs.

## 2021-06-30 NOTE — BH CONSULTATION LIAISON ASSESSMENT NOTE - RISK ASSESSMENT
The patient denies any current or past SI, HI, AH/VH, or paranoia. She is A&O X 4 and lives at home with her son and . Pt is a low risk.

## 2021-07-01 ENCOUNTER — TRANSCRIPTION ENCOUNTER (OUTPATIENT)
Age: 66
End: 2021-07-01

## 2021-07-01 VITALS
HEART RATE: 81 BPM | DIASTOLIC BLOOD PRESSURE: 91 MMHG | OXYGEN SATURATION: 98 % | RESPIRATION RATE: 17 BRPM | SYSTOLIC BLOOD PRESSURE: 132 MMHG | TEMPERATURE: 99 F

## 2021-07-01 LAB

## 2021-07-01 PROCEDURE — G9005: CPT

## 2021-07-01 PROCEDURE — 99239 HOSP IP/OBS DSCHRG MGMT >30: CPT

## 2021-07-01 RX ORDER — ALBUTEROL 90 UG/1
2 AEROSOL, METERED ORAL
Qty: 0 | Refills: 0 | DISCHARGE
Start: 2021-07-01

## 2021-07-01 RX ORDER — LANOLIN ALCOHOL/MO/W.PET/CERES
2 CREAM (GRAM) TOPICAL
Qty: 0 | Refills: 0 | DISCHARGE
Start: 2021-07-01

## 2021-07-01 RX ADMIN — Medication 1 TABLET(S): at 12:37

## 2021-07-01 RX ADMIN — SODIUM CHLORIDE 3 MILLILITER(S): 9 INJECTION INTRAMUSCULAR; INTRAVENOUS; SUBCUTANEOUS at 13:52

## 2021-07-01 RX ADMIN — SODIUM CHLORIDE 3 MILLILITER(S): 9 INJECTION INTRAMUSCULAR; INTRAVENOUS; SUBCUTANEOUS at 05:21

## 2021-07-01 NOTE — DISCHARGE NOTE PROVIDER - NSFOLLOWUPCLINICS_GEN_ALL_ED_FT
[facility] Psychiatry  Psychiatry  [address] [place] [address]  Phone: [phone]  Fax:

## 2021-07-01 NOTE — DISCHARGE NOTE NURSING/CASE MANAGEMENT/SOCIAL WORK - PATIENT PORTAL LINK FT
You can access the FollowMyHealth Patient Portal offered by [facility] by registering at the following website: [email or web address]. By joining [employer]’s FollowMyHealth portal, you will also be able to view your health information using other applications (apps) compatible with our system.

## 2021-07-01 NOTE — DISCHARGE NOTE PROVIDER - HOSPITAL COURSE
64 y/o F with PMH of anxiety, COPD, GERD, laminectomy presents to the ED after syncopal episode. ECHO/CT were unremarkable. Course complicated by increasing confusion/paranoia. Psych on board. Pending further psychiatric assessment and safe dispo planning.     Problem/Plan - 1:  ·  Problem: Delirium.  Plan: -No AH/VH/SI/HI.   -UA appears negative. B12/folate/TSH WNL. No other metabolic issues. UTox also negative.   -Appreciate psych recs.   -On assessment this AM - pt is again delirious and paranoid. Will follow up with psych for further therapeutic recommendations.   -SW/CM follow up for safe dispo planning.  -Spoke w/ son Michael updated on POC. States he is able to pick pt up if cleared for DC. He will also monitor her when she returns home and assist w/ meds and follow up appointments.     Problem/Plan - 2:  ·  Problem: Syncope.  Plan: No new episodes. ECHO showed no acute findings. CTH and CT neck negative.  No significant tele events.  No furhter need to cont tele at this point.   PT recs appreciated - home, no skilled needs.     Problem/Plan - 3:  ·  Problem: Anxiety.  Plan: Hx of BZD abuse per chart review. Not on meds. Would avoid use of meds for sx if possible.  BP improved now.     Problem/Plan - 4:  ·  Problem: Anemia.  Plan: Stable.   Iron studies normal. Retic index <1. B12, folate normal.  Given hx of ETOH abuse could be marrow related.  Follow up as OP.     Problem/Plan - 5:  ·  Problem: Urinary incontinence.  Plan: States she voiding w/o issues. Chronic incontinence. F/u urology as OP.     Problem/Plan - 6:  Problem: Need for prophylactic measure. Plan: DVT ppx- Early and progressive ambulation.     Dispo- Await psychiatry follow up. Otherwise medically stable. Dispo pending psych recs. Follow up CM as family is interested in home services.
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
## 2021-07-01 NOTE — PROGRESS NOTE ADULT - PROBLEM SELECTOR PLAN 1
-No AH/VH/SI/HI.   -UA appears negative. B12/folate/TSH WNL. No other metabolic issues. UTox also negative.   -Appreciate psych recs.   -On assessment this AM - pt is again delirious and paranoid. Will follow up with psych for further therapeutic recommendations.   -SW/CM follow up for safe dispo planning.  -Spoke w/ son Michael updated on POC. States he is able to pick pt up if cleared for DC. He will also monitor her when she returns home and assist w/ meds and follow up appointments.

## 2021-07-01 NOTE — PROGRESS NOTE ADULT - PROBLEM SELECTOR PLAN 2
No new episodes. ECHO showed no acute findings. CTH and CT neck negative.  No significant tele events.  No furhter need to cont tele at this point.   PT recs appreciated - home, no skilled needs.
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
## 2021-07-01 NOTE — PROGRESS NOTE ADULT - ASSESSMENT
66 y/o F with PMH of anxiety, COPD, GERD, laminectomy  presents to the ED after syncopal episode. 
66 y/o F with PMH of anxiety, COPD, GERD, laminectomy presents to the ED after syncopal episode. ECHO/CT were unremarkable. Course complicated by increasing confusion/paranoia. Psych consult is pending. 
66 y/o F with PMH of anxiety, COPD, GERD, laminectomy  presents to the ED after syncopal episode. 
66 y/o F with PMH of anxiety, COPD, GERD, laminectomy presents to the ED after syncopal episode. ECHO/CT were unremarkable. Course complicated by increasing confusion/paranoia. Psych on board. Pending further psychiatric assessment and safe dispo planning.

## 2021-07-01 NOTE — DISCHARGE NOTE PROVIDER - NSDCMRMEDTOKEN_GEN_ALL_CORE_FT
albuterol 90 mcg/inh inhalation aerosol: 2 puff(s) inhaled every 6 hours, As needed, Shortness of Breath  melatonin 3 mg oral tablet: 2 tab(s) orally once a day (at bedtime)  Multiple Vitamins oral tablet: 1 tab(s) orally once a day

## 2021-07-01 NOTE — PROGRESS NOTE ADULT - SUBJECTIVE AND OBJECTIVE BOX
[facility] Division of Hospital Medicine  Charles MendozaVinny) MD Jaiden  Pager [phone]    SUBJECTIVE:  Follow up for syncope.    Pt seen and evaluated at bedside. No new complaints. Denies SI/HI. States that the night was "weird." Her TV channels were scrambled and there was a lot of noise (unclear if it was her neighbor). Otherwise, no SOB, CP, palps.      ROS: All systems negative except as noted.      Vital Signs Last 24 Hrs  T(C): 36.7 (2021 04:33), Max: 36.8 (2021 12:58)  T(F): 98 (2021 04:33), Max: 98.3 (2021 17:48)  HR: 74 (2021 04:33) (74 - 97)  BP: 128/86 (2021 04:33) (128/86 - 157/91)  BP(mean): --  RR: 16 (2021 04:33) (16 - 17)  SpO2: 99% (2021 04:33) (98% - 99%)    PHYSICAL EXAM:  Gen- In bed, comfortable, NAD  Eyes- EOMI, PERRLA, nonicteric.  EMNT- Fair dentition. MMM. No tonsilar exudates. No posterior pharynx erythema.  Neck- Supple. No masses. No tracheal deviation.  Resp- CTAB, good effort. No r/r/w. No accessory muscle use.  CVS- RRR, S1S2, no g/r/m. No LE edema.  GI- Soft abd, NT, ND, +BSx4. No HSM.  MSK- No C/C. ROM intact. No crepitus.  Neuro- CN II-XII intact. Speech fluent/face symmetric. Sensation intact.  Skin- No rashes/ulcers. Warm/moist.  Psych- AAOx3. Appropriate mood/affect.      MEDICATION:  MEDICATIONS  (STANDING):  melatonin 6 milliGRAM(s) Oral at bedtime  multivitamin 1 Tablet(s) Oral daily  sodium chloride 0.9% lock flush 3 milliLiter(s) IV Push every 8 hours  sodium chloride 0.9%. 1000 milliLiter(s) (75 mL/Hr) IV Continuous <Continuous>    MEDICATIONS  (PRN):  ALBUTerol    90 MICROgram(s) HFA Inhaler 2 Puff(s) Inhalation every 6 hours PRN Shortness of Breath        LABORATORY:                          12.0   5.94  )-----------( 337      ( 2021 07:28 )             36.7     06-30    137  |  101  |  19  ----------------------------<  97  3.6   |  23  |  0.95    Ca    9.9      2021 07:28  Phos  3.7     -  Mg     2.30     30    TPro  7.1  /  Alb  4.2  /  TBili  0.6  /  DBili  x   /  AST  17  /  ALT  10  /  AlkPhos  47        Urinalysis Basic - ( 2021 14:46 )    Color: Yellow / Appearance: Clear / S.023 / pH: x  Gluc: x / Ketone: Negative  / Bili: Negative / Urobili: <2 mg/dL   Blood: x / Protein: Trace / Nitrite: Negative   Leuk Esterase: Negative / RBC: x / WBC x   Sq Epi: x / Non Sq Epi: x / Bacteria: x            COVID-19 PCR: NotDetec (2021 19:06)  COVID-19 PCR: NotDetec (20 Mar 2021 15:50)  COVID-19 PCR: NotDetec (10 Feb 2021 23:34)  COVID-19 PCR: NotDetec (2021 20:07)                          
[facility] Division of Hospital Medicine  Charles MendozaVinny) MD Jaiden  Pager [phone]    SUBJECTIVE:  Follow up for syncope.    Pt seen and evaluated at bedside this AM. No o/n events. DEnies any SOB/Cp/NV. No dizziness. No pain. Discussed POC, unsure if she wants to proceed w/ ECHO.       ROS: All systems negative except as noted.      Vital Signs Last 24 Hrs  T(C): 36.9 (28 Jun 2021 12:12), Max: 37 (27 Jun 2021 20:55)  T(F): 98.4 (28 Jun 2021 12:12), Max: 98.6 (27 Jun 2021 20:55)  HR: 76 (28 Jun 2021 13:32) (64 - 82)  BP: 160/97 (28 Jun 2021 13:32) (112/75 - 160/97)  BP(mean): --  RR: 17 (28 Jun 2021 12:12) (16 - 17)  SpO2: 100% (28 Jun 2021 12:12) (100% - 100%)      PHYSICAL EXAM:  Gen- In bed, comfortable, NAD  Eyes- EOMI, PERRLA, nonicteric.  EMNT- Fair dentition. MMM. No tonsilar exudates. No posterior pharynx erythema.  Neck- Supple. No masses. No tracheal deviation.  Resp- CTAB, good effort. No r/r/w. No accessory muscle use.  CVS- RRR, S1S2, no g/r/m. No LE edema.  GI- Soft abd, NT, ND, +BSx4. No HSM.  MSK- No C/C. ROM intact. No crepitus.  Neuro- CN II-XII intact. Speech fluent/face symmetric. Sensation intact.  Skin- No rashes/ulcers. Warm/moist.  Psych- AAOx3. Appropriate mood/affect.      MEDICATION:  MEDICATIONS  (STANDING):  multivitamin 1 Tablet(s) Oral daily  sodium chloride 0.9% lock flush 3 milliLiter(s) IV Push every 8 hours  sodium chloride 0.9%. 1000 milliLiter(s) (75 mL/Hr) IV Continuous <Continuous>    MEDICATIONS  (PRN):  ALBUTerol    90 MICROgram(s) HFA Inhaler 2 Puff(s) Inhalation every 6 hours PRN Shortness of Breath      LABORATORY:                          10.3   8.07  )-----------( 302      ( 27 Jun 2021 18:45 )             32.5     06-27    139  |  106  |  17  ----------------------------<  90  4.3   |  24  |  0.84    Ca    8.5      27 Jun 2021 18:42  Mg     2.1     06-27    TPro  5.6<L>  /  Alb  3.6  /  TBili  <0.2  /  DBili  x   /  AST  16  /  ALT  9   /  AlkPhos  40  06-27      COVID-19 PCR: NotDetec (27 Jun 2021 19:06)  COVID-19 PCR: NotDetec (20 Mar 2021 15:50)  COVID-19 PCR: NotDetec (10 Feb 2021 23:34)  COVID-19 PCR: NotDetec (08 Feb 2021 20:07)              
[facility] Division of Hospital Medicine  Charles MendozaVinny) MD Jaiden  Pager [phone]    SUBJECTIVE:  Follow up for syncope.    Pt seen and evaluated at bedside this AM. No o/n events. DEnies any SOB/Cp/NV. No dizziness. Told me she walked around w/o any issues. Tolerating PO.      ROS: All systems negative except as noted.      Vital Signs Last 24 Hrs  T(C): 36.8 (29 Jun 2021 12:58), Max: 37.1 (28 Jun 2021 18:02)  T(F): 98.2 (29 Jun 2021 12:58), Max: 98.8 (28 Jun 2021 18:02)  HR: 97 (29 Jun 2021 12:58) (69 - 97)  BP: 157/91 (29 Jun 2021 12:58) (131/104 - 157/91)  BP(mean): --  RR: 17 (29 Jun 2021 12:58) (17 - 18)  SpO2: 98% (29 Jun 2021 12:58) (98% - 100%)    PHYSICAL EXAM:  Gen- In bed, comfortable, NAD  Eyes- EOMI, PERRLA, nonicteric.  EMNT- Fair dentition. MMM. No tonsilar exudates. No posterior pharynx erythema.  Neck- Supple. No masses. No tracheal deviation.  Resp- CTAB, good effort. No r/r/w. No accessory muscle use.  CVS- RRR, S1S2, no g/r/m. No LE edema.  GI- Soft abd, NT, ND, +BSx4. No HSM.  MSK- No C/C. ROM intact. No crepitus.  Neuro- CN II-XII intact. Speech fluent/face symmetric. Sensation intact.  Skin- No rashes/ulcers. Warm/moist.  Psych- AAOx3. Appropriate mood/affect.      MEDICATION:  MEDICATIONS  (STANDING):  multivitamin 1 Tablet(s) Oral daily  sodium chloride 0.9% lock flush 3 milliLiter(s) IV Push every 8 hours  sodium chloride 0.9%. 1000 milliLiter(s) (75 mL/Hr) IV Continuous <Continuous>    MEDICATIONS  (PRN):  ALBUTerol    90 MICROgram(s) HFA Inhaler 2 Puff(s) Inhalation every 6 hours PRN Shortness of Breath      LABORATORY:                          12.1   7.42  )-----------( 356      ( 29 Jun 2021 06:40 )             37.4     06-29    137  |  102  |  11  ----------------------------<  93  3.8   |  20<L>  |  0.76    Ca    9.8      29 Jun 2021 06:40  Mg     2.1     06-27    TPro  7.1  /  Alb  4.2  /  TBili  0.6  /  DBili  x   /  AST  17  /  ALT  10  /  AlkPhos  47  06-29              COVID-19 PCR: NotDetec (27 Jun 2021 19:06)  COVID-19 PCR: NotDetec (20 Mar 2021 15:50)  COVID-19 PCR: NotDetec (10 Feb 2021 23:34)  COVID-19 PCR: NotDetec (08 Feb 2021 20:07)                    
[facility] Division of Hospital Medicine  Charles Stewart) MD Jaiden  Pager [phone]    SUBJECTIVE:  Follow up for syncope.    Pt seen and evaluated at bedside. No overnight events. States that she has no place to go, hence she did not go yesterday. She states her phone is out of battery and shes unable to call her son. She also reports her /son moving to [place]. As a result of that, she has no place to go to and will end up on the street if she were to be DC. Denies any AH/VH.       ROS: All systems negative except as noted.      Vital Signs Last 24 Hrs  T(C): 37.1 (01 Jul 2021 13:41), Max: 37.2 (30 Jun 2021 17:31)  T(F): 98.7 (01 Jul 2021 13:41), Max: 98.9 (30 Jun 2021 17:31)  HR: 80 (01 Jul 2021 13:41) (66 - 88)  BP: 131/87 (01 Jul 2021 13:41) (121/81 - 139/88)  BP(mean): --  RR: 17 (01 Jul 2021 13:41) (16 - 18)  SpO2: 100% (01 Jul 2021 13:41) (98% - 100%)    PHYSICAL EXAM:  Gen- In bed, comfortable, NAD  Eyes- EOMI, PERRLA, nonicteric.  EMNT- Fair dentition. MMM. No tonsilar exudates. No posterior pharynx erythema.  Neck- Supple. No masses. No tracheal deviation.  Resp- CTAB, good effort. No r/r/w. No accessory muscle use.  CVS- RRR, S1S2, no g/r/m. No LE edema.  GI- Soft abd, NT, ND, +BSx4. No HSM.  MSK- No C/C. ROM intact. No crepitus.  Neuro- CN II-XII intact. Speech fluent/face symmetric. Sensation intact.  Skin- No rashes/ulcers. Warm/moist.  Psych- AAOx3. Appropriate mood/affect.      MEDICATION:  MEDICATIONS  (STANDING):  melatonin 6 milliGRAM(s) Oral at bedtime  multivitamin 1 Tablet(s) Oral daily  sodium chloride 0.9% lock flush 3 milliLiter(s) IV Push every 8 hours  sodium chloride 0.9%. 1000 milliLiter(s) (75 mL/Hr) IV Continuous <Continuous>    MEDICATIONS  (PRN):  ALBUTerol    90 MICROgram(s) HFA Inhaler 2 Puff(s) Inhalation every 6 hours PRN Shortness of Breath      LABORATORY:  No new labs.

## 2021-07-01 NOTE — DISCHARGE NOTE NURSING/CASE MANAGEMENT/SOCIAL WORK - NSDCVIVACCINE_GEN_ALL_CORE_FT
COVID-19 vaccine, vector-nr, rS-Ad26, PF, 0.5 mL (Delicia); 25-Mar-2021 17:52; Malik Garcia (YAS); Delicia; [id number] (Exp. Date: 25-May-2021); IntraMuscular; Deltoid Left.; 0.5 milliLiter(s);

## 2021-07-01 NOTE — DISCHARGE NOTE PROVIDER - NSDCCPCAREPLAN_GEN_ALL_CORE_FT
PRINCIPAL DISCHARGE DIAGNOSIS  Diagnosis: Syncope  Assessment and Plan of Treatment: Followup with Please follow up with your primary care physician regarding your hospitalization and take all medications prescribed.      SECONDARY DISCHARGE DIAGNOSES  Diagnosis: GERD (gastroesophageal reflux disease)  Assessment and Plan of Treatment: Please follow up with your primary care physician regarding your hospitalization and take all medications prescribed.

## 2021-07-01 NOTE — PROGRESS NOTE ADULT - PROBLEM SELECTOR PLAN 6
DVT ppx- Early and progressive ambulation.     Dispo- Await psychiatry follow up. Otherwise medically stable. Dispo pending psych recs. Follow up CM as family is interested in home services.
SCD for now.    Dispo- Await psychiatry input. Otherwise medically stable. Dispo pending psych recs.
SCD for now.    Dispo- Pending ECHO/CT. NOw needs psych eval.     Communication:  Spoke w/ son Yann this afternoon.

## 2021-07-01 NOTE — PROGRESS NOTE BEHAVIORAL HEALTH - PRIMARY DX
Benzodiazepine withdrawal with complication Oxybutynin Counseling:  I discussed with the patient the risks of oxybutynin including but not limited to skin rash, drowsiness, dry mouth, difficulty urinating, and blurred vision.

## 2021-07-01 NOTE — BH CONSULTATION LIAISON PROGRESS NOTE - ADDITIONAL DETAILS / COMMENTS
Pt scored a 29/30 on MOCHA assessment and recognizes the illogical nature of some of her paranoia.

## 2021-07-01 NOTE — BH CONSULTATION LIAISON PROGRESS NOTE - NSBHCHARTREVIEWVS_PSY_A_CORE FT
Vital Signs Last 24 Hrs  T(C): 37.1 (01 Jul 2021 13:41), Max: 37.2 (30 Jun 2021 17:31)  T(F): 98.7 (01 Jul 2021 13:41), Max: 98.9 (30 Jun 2021 17:31)  HR: 80 (01 Jul 2021 13:41) (66 - 88)  BP: 131/87 (01 Jul 2021 13:41) (121/81 - 139/88)  BP(mean): --  RR: 17 (01 Jul 2021 13:41) (16 - 18)  SpO2: 100% (01 Jul 2021 13:41) (98% - 100%)

## 2021-07-06 ENCOUNTER — NON-APPOINTMENT (OUTPATIENT)
Age: 66
End: 2021-07-06

## 2021-07-21 ENCOUNTER — NON-APPOINTMENT (OUTPATIENT)
Age: 66
End: 2021-07-21

## 2021-08-17 NOTE — ED ADULT TRIAGE NOTE - TEMPERATURE IN FAHRENHEIT (DEGREES F)
Past History


Past Medical History:  Asthma, GERD, Other


Additional Past Medical Histor:  NECK


Past Surgical History:  TURP, Other


Additional Past Surgical Histo:  WRIST


Alcohol Use:  None





Adult General


Chief Complaint


Chief Complaint:  URINARY RETENTION





HPI


HPI





Patient is a 74 year old male who presents with difficulty urinating.  He feels 

that his bladder is full but when he goes only small amount comes out.  He has 

to frequently urinate small amounts.  He had kidney stones 2 weeks ago but it 

passed on his own and has seen urologist.  This evening he noticed small amount 

of blood in his urine as well as pain in frequency described above.  He denies 

any fever, nausea, vomiting, abdominal pain.  Denies much back pain.  He has no 

other complaints.





Review of Systems


Review of Systems





Constitutional: Denies fever or chills []


Eyes: Denies change in visual acuity, redness, or eye pain []


HENT: Denies nasal congestion or sore throat []


Respiratory: Denies cough or shortness of breath []


Cardiovascular: No additional information not addressed in HPI []


GI: Denies abdominal pain, nausea, vomiting, bloody stools or diarrhea []


: Above-stated symptoms


Musculoskeletal: Denies back pain or joint pain []


Integument: Denies rash or skin lesions []


Neurologic: Denies headache, focal weakness or sensory changes []


Endocrine: Denies polyuria or polydipsia []





All other systems were reviewed and found to be within normal limits, except as 

documented in this note.





Family History


Family History


Unremarkable





Allergies


Allergies





Allergies








Coded Allergies Type Severity Reaction Last Updated Verified


 


  No Known Drug Allergies    8/17/21 No











Physical Exam


Physical Exam





Constitutional: No acute distress, non-toxic appearance. []


HENT: Normocephalic, atraumatic, bilateral external ears normal, oropharynx 

moist, no oral exudates, nose normal. []


Eyes: PERRLA, EOMI, conjunctiva normal, no discharge. [] 


Neck: Normal range of motion, no tenderness, supple,


Cardiovascular:Heart rate regular rhythm, no murmur []


Lungs & Thorax:  Bilateral breath sounds clear to auscultation []


Abdomen: Bowel sounds normal, soft, no tenderness, some fullness in the bladder 

area.


Skin: Warm, dry, no erythema, no rash. [] 


Back: No tenderness, no CVA tenderness. [] 


Extremities: No tenderness, no cyanosis, no clubbing, ROM intact, no edema. [] 


Neurologic: Alert and oriented X 3, normal motor function, normal sensory 

function, no focal deficits noted. []


Psychologic: Affect normal, judgement normal, mood normal. []





Current Patient Data


Vital Signs





                                   Vital Signs








  Date Time  Temp Pulse Resp B/P (MAP) Pulse Ox O2 Delivery O2 Flow Rate FiO2


 


8/17/21 00:55 98.6 88  145/89    








Lab Results





                                Laboratory Tests








Test


 8/17/21


00:55


 


Urine Collection Type Unknown  


 


Urine Color Yellow  


 


Urine Clarity Clear  


 


Urine pH 5.5  


 


Urine Specific Gravity 1.010  


 


Urine Protein


 Neg


(NEG-TRACE)


 


Urine Glucose (UA)


 Neg mg/dL


(NEG)


 


Urine Ketones (Stick)


 Neg mg/dL


(NEG)


 


Urine Blood Mod (NEG)  


 


Urine Nitrite Neg (NEG)  


 


Urine Bilirubin Neg (NEG)  


 


Urine Urobilinogen Dipstick


 0.2 mg/dL (0.2


mg/dL)


 


Urine Leukocyte Esterase Trace (NEG)  


 


Urine RBC


 1-2 /HPF (0-2)





 


Urine WBC


 5-10 /HPF


(0-4)


 


Urine Squamous Epithelial


Cells Few /LPF  





 


Urine Bacteria


 Few /HPF


(0-FEW)











EKG


EKG


[]





Radiology/Procedures


Radiology/Procedures


[]





Heart Score


C/O Chest Pain:  No


Risk Factors:


Risk Factors:  DM, Current or recent (<one month) smoker, HTN, HLP, family 

history of CAD, obesity.


Risk Scores:


Risk Factors:  DM, Current or recent (<one month) smoker, HTN, HLP, family 

history of CAD, obesity.





Course & Med Decision Making


Course & Med Decision Making


Patient had presented with urinary retention and some pain with history of 

kidney stone.  CT scan of the abdomen pelvis without contrast done per 

radiologist shows previously resolved 3 mm calculus but otherwise decreasing 

hydronephrosis and no acute blockage.  Patient tried voiding in the emergency 

department several times and each time he got 30 cc or so urine output.  The po

st void assessment done showed that he still had 700 mL of urine in his bladder.

  I discussed with patient the need to place Mandujano and leave it in place.  He 

understands it and agreed to have it done.  He had a Mandujano catheter placed which

 output clear urine approximately 700 mL.  He felt significantly better.  He is 

going to follow-up with his urologist.





Rachel Disclaimer


Dragon Disclaimer


This electronic medical record was generated, in whole or in part, using a voice

 recognition dictation system.





Departure


Departure:


Referrals:  


LEESA MARTINES MD (PCP)











NAY GONZALES MD           Aug 17, 2021 02:49
98.6

## 2022-03-16 NOTE — ED PROVIDER NOTE - QRS
[Fever] : fever [Nasal Discharge] : nasal discharge [Nasal Congestion] : nasal congestion [Cough] : cough [Appetite Changes] : appetite changes [Vomiting] : vomiting [Diarrhea] : no diarrhea [Negative] : Genitourinary 84

## 2022-05-27 NOTE — ED PROVIDER NOTE - CPE EDP GASTRO NORM
Called and spoke to Janeth, Physical therapist with [facility]. ([phone]) She is requesting verbal orders for 4 visits of physical therapy over the next 6 weeks to help with some right gluteal pain and monitor high blood pressure she's been having.     Gave verbal okay per [facility] to the [facility] policy.    Lupe MONTERO RN, BSN     normal...

## 2022-06-13 NOTE — ED PROVIDER NOTE - CHIEF COMPLAINT
[Maximal Pain Intensity: 0/10] : 0/10 [Least Pain Intensity: 0/10] : 0/10 [NoTreatment Scheduled] : no treatment scheduled The patient is a 65y Female complaining of syncope. [90: Able to carry normal activity; minor signs or symptoms of disease.] : 90: Able to carry normal activity; minor signs or symptoms of disease.  [ECOG Performance Status: 1 - Restricted in physically strenuous activity but ambulatory and able to carry out work of a light or sedentary nature] : Performance Status: 1 - Restricted in physically strenuous activity but ambulatory and able to carry out work of a light or sedentary nature, e.g., light house work, office work

## 2022-08-16 NOTE — ED BEHAVIORAL HEALTH ASSESSMENT NOTE - NS ED BHA PLAN TR BH CONTACTED FT
Alert and oriented to person, place and time/Awake/Symptoms improved/No adverse reaction to first time med in ED has no established out-Pt psychiatrist

## 2022-08-30 NOTE — DISCHARGE NOTE PROVIDER - YES NO FOR MLM POSITIVE OR NEGATIVE COVID RESULT
Nursing notes reviewed and accepted.    Jeremy Cowan is a 15 year old male who presents for well exam.  Patient presents with Mother.    Concerns raised today include: none    Past Medical Hx: No hospitalization, no surgeries, no trauma. No history of concussion. There is no history of fainting or shortness of breath. No shortness of breath with exercise.    Social Hx:   School: Doing well at school and likes school.  Interests / Activities: Involved in extracurricular activities.  Future Plans: not sure  Tobacco: Nonsmoker, no exposure to secondhand smoke.  ETOH: Not using.  Illicit drugs or Homeopathic medicines: Denies.  Sexually activity: Denies.    Family Hx: Negative for athletic cardiac events, no history of heart attacks in family before 55 years of age. Mom has hx of breast cancer.    PE:  Blood pressure 112/66, pulse 60, temperature 97.5 °F (36.4 °C), temperature source Oral, resp. rate 16, height 5' 8\" (1.727 m), weight 75.4 kg (166 lb 1.9 oz).  Gen: Well appearing male, no acute distress  Derm: No lesions or rashes noted  HEENT: PERRL, EOMI, no conjunctival injection. No scleral icterus. TM with normal landmarks bilaterally.  Oropharynx with moist mucus membranes, clear.  Neck: supple  Nodes: no adenopathy  Lungs: Clear to auscultation. No wheezes, rales, rhonchi. Normal work of breathing.  Cardiac: Regular rate and rhythm, normal S1S2, no murmur appreciated.  Abdomen: Soft, nontender, nondistended. Normoactive bowel sounds. No organomegaly or masses.  Genital: Elie 4 male without external lesions. Testes without abnormalities.  No hernia.  Extremities: Full ROM UE/LE. Warm, well perfused. No clubbing/cyanosis/edema  Back: No scoliosis  Neuro: Grossly intact. Strength 5/5 bilaterally. Normal tone. Gait normal    ASSESSMENT: Well 15 year old male adolescent.  Sport physical.    BEHAVIOR/GUIDANCE:      Puberty - DISCUSSED      Self testicular examination - DISCUSSED      Accident prevention - 
DISCUSSED      School achievement - DISCUSSED      Family/Peer relationships - DISCUSSED      Regular physical activity - DISCUSSED      Healthy food choices - DISCUSSED         Tobacco, ETOH, drug avoidance - DISCUSSED      Sexual activity & avoidance / safe sex - DISCUSSED    PLAN:    Anticipatory guidance sheet   Immunizations per orders  RTC in 1 year  for WCE or sooner prn illness/concerns.      Permission for sport participation with no restriction given.          M  
,
30-Aug-2022 19:00

## 2022-11-21 NOTE — BEHAVIORAL HEALTH ASSESSMENT NOTE - REMOTE MEMORY
Try taking 1/2 tablet of Rosuvastatin (Crestor) once daily to see if this helps with cramps.    Increase your irbesartan to 300 mg daily (you can take 2 of your 150 mg pills until you run out).    Call the GI department back about scheduling your colonoscopy.  
Impaired

## 2022-12-01 NOTE — DISCHARGE NOTE PROVIDER - NSDCCONDITION_GEN_ALL_CORE
Stable AP course complicated by short cervix   OB: sab x1 no d&c        short cervix- jesikas cerclage placed on 11/9   GYN: denies     GBS positive 11/28

## 2022-12-08 NOTE — PROGRESS NOTE ADULT - PROBLEM SELECTOR PLAN 3
-Patient endorsing bladder incontinence as well, no saddle anesthesia and still able to walk around without worsening of back pain per patient  -CT spine with degenerative changes   -Will obtain MRI spine given history of L1-L5 laminectomy  -Surgeon was Dr. Syed Salinas who does not come to the hospital.  -Spine surgery consulted, follow up recommendations  -Given chronicity of incontinence and lack of saddle anesthesia doubt acute injury
MEDICATION REFILL REQUEST      Name of Medication:  Eliquis  Dose:  5 mg  Frequency:    Quantity:    Days' supply:  90      Pharmacy Name/Location:  [facility]
Requested Prescriptions     Pending Prescriptions Disp Refills    apixaban (ELIQUIS) 5 MG TABS tablet 180 tablet 3     Sig: Take 1 tablet by mouth 2 times daily
-Patient endorsing bladder incontinence as well, no saddle anesthesia and still able to walk around without worsening of back pain per patient  -CT spine with degenerative changes   -Will obtain MRI spine given history of L1-L5 laminectomy  -Surgeon was Dr. Syed Salinas who does not come to the hospital.  -Spine surgery awaiting MRI
-Patient endorsing bladder incontinence as well, no saddle anesthesia and still able to walk around without worsening of back pain per patient  -CT spine with degenerative changes   -Will obtain MRI spine given history of L1-L5 laminectomy  -Surgeon was Dr. ySed Salinas who does not come to the hospital.  -Spine surgery consulted, follow up recommendations  -Given chronicity of incontinence and lack of saddle anesthesia doubt acute injury
-Patient endorsing bladder incontinence as well, no saddle anesthesia and still able to walk around without worsening of back pain per patient  -CT spine with degenerative changes   -Will obtain MRI spine given history of L1-L5 laminectomy  -Surgeon was Dr. Syed Salinas who does not come to the hospital.  -Spine surgery consulted, follow up recommendations  -Given chronicity of incontinence and lack of saddle anesthesia doubt acute injury

## 2023-01-10 NOTE — PROGRESS NOTE BEHAVIORAL HEALTH - NSBHFUPVIOL_PSY_A_CORE
[facility] [place] [id number] [facility]  [facility] [place] [id number] [facility] [address]      Eva Frances :1963 MRN:[id number]    Due to COVID-19 precautions, this visit was performed via live interactive two-way video with patient's consent.   Clinician Location:Home.  Patient Location: Home.  Verified patient identity:  [x] Yes      Treatment Plan:  Session  of current treatment plan    1/10/2023 Time Visit Began: 2:30PM  Time Visit Ended: 3:23PM    Session Type:Therapy 53+ minutes (53325)             Others Present: No     Primary Diagnosis: .Major Depressive Disorder, Single Episode, Mild (F32.0)      Chief complaint in patient's own words: \"I had a very difficult week and struggled to get out of bed\"    D: Patient was seen for follow-up to address grief.    Suicide/Homicide/Violence Ideation: No. No imminent safety concerns identified or reported during current encounter.     Change in Medication(s) Reported: No        Current Outpatient Medications   Medication Sig   • fluticasone (FLONASE) 50 MCG/ACT nasal spray USE 2 SPRAYS IN EACH       NOSTRIL DAILY   • semaglutide,0.25 or 0.5 mg/DOSE, (Ozempic, 0.25 or 0.5 MG/DOSE,) (1.34 mg/ml) 0.25 or 0.5 MG/DOSE injection Inject 0.5 mg into the skin every 7 days.   • traMADol (ULTRAM) 50 MG tablet Take 1 tablet by mouth 2 times daily as needed for Pain.   • Basaglar KwikPen 100 UNIT/ML pen-injector Inject 33 Units into the skin nightly. Prime 2 units before each dose.   • Continuous Blood Gluc Transmit (Dexcom G6 Transmitter) Misc 1 Units every 3 months.   • Continuous Blood Gluc Sensor (Dexcom G6 Sensor) Misc Insert new sensor every 10 days.  As directed   • baclofen (LIORESAL) 10 MG tablet Take 1 tablet by mouth at bedtime. May cause drowsiness   • gabapentin (NEURONTIN) 100 MG capsule Take 1 capsule by mouth in the morning and 1 capsule at noon and 1 capsule in the evening.   • metformin (GLUCOPHAGE) 1000 
MG tablet TAKE 1 TABLET TWICE DAILY  WITH MEALS   • atorvastatin (LIPITOR) 40 MG tablet Take 1 tablet by mouth daily.   • losartan (COZAAR) 25 MG tablet Take 1 tablet by mouth daily.   • BD Pen Needle Joanne 2nd Gen 32G X 4 MM Misc    • insulin aspart (NovoLOG FLEXPEN) 100 UNIT/ML pen-injector Inject 12 Units into the skin 3 times daily (before meals). Prime 2 units before each dose.   • zoster vaccine recomb adjuvanted (Shingrix) 50 MCG/0.5ML injection Inject 0.5 mLs into the muscle 1 time. Repeat dose in 2 to 6 months (unless 1 dose already given), for a total of 2 doses.   • blood glucose lancets Test blood sugar 4 times daily. Diagnosis: dm2 Meter: per insurance coverage   • OneTouch Verio test strip    • aspirin 81 MG EC tablet Take 1 tablet by mouth daily.     No current facility-administered medications for this visit.       PHQ 9 Scores  Date Adult PHQ 9 Score Adult PHQ 9 Interpretation   12/7/2022 2 Minimal Depression   11/23/2022 4 Minimal Depression   11/16/2022 9 Mild Depression   11/10/2022 7 Mild Depression   11/9/2022 9 Mild Depression        ANURAG 7 Scores  Date ANURAG 7 Score   1/10/2023 3   12/7/2022 6   11/23/2022 9   11/16/2022 9   11/10/2022 10       A: Readministered the Patient Health Questionnaire - 9 (PHQ-9) and Generalized Anxiety Disorder - 7 (ANURAG-7), and reviewed results with patient.     R: Patient was pleasant, alert, oriented, receptive to feedback, and engaged in the therapeutic process. Affect was appropriate. She conveyed understanding regarding education provided.     P: Plan to continue individual follow-up with next appointment scheduled for 1/25. Patient was encouraged to contact writer with any questions or concerns. Will continue collaboration with patient's primary care provider.     Summary:   Patient stated that shortly after her last appointment she had a very difficult week. For several days in a row she struggled to get out of bed and was isolating from her family/friends. A 
lot of people were calling to check up on her, but she didn't want to talk. Per the patient in those moments , she struggles to hear about how amazing her mom was because it makes her more emotional. During this time, she did watch a sad episode on TV which triggered more emotions.   Per the patient, she was able to get out of this space through self care/ 'talking' to her mom. Patient also stated that she started back at work yesterday which she does feel helped get her out of her bad mood. She knows that her mom/ brother would want her to be happy.   TAMARAW discussed writing down coping skills/ making a plan for herself when she does feel down. TAMARAW educated patient on how to re-frame negative thoughts about moving. Patient is still struggling with the idea that she is 'not grieving enough.'        Discharge Plan: [place] Sessions      Serenity Ann LCSW   SHAISTA Therapist, [place]   [address] [facility] [facility]  [facility]        
none known

## 2023-01-23 NOTE — ED PROVIDER NOTE - CONDITION AT DISCHARGE:
**For crisis resources, please see the information at the end of this document**   Patient Education    Thank you for coming to the [facility].    Lab Testing:  If you had lab testing today and your results are reassuring or normal they will be mailed to you or sent through [employer] within 7 days. If the lab tests need quick action we will call you with the results. The phone number we will call with results is # [phone] (home) . If this is not the best number please call our clinic and change the number.    Medication Refills:  If you need any refills please call your pharmacy and they will contact us. Our fax number for refills is [phone]. Please allow three business for refill processing. If you need to  your refill at a new pharmacy, please contact the new pharmacy directly. The new pharmacy will help you get your medications transferred.     Scheduling:  If you have any concerns about today's visit or wish to schedule another appointment please call our office during normal business hours [phone] (8-5:00 M-F)    Contact Us:  Please call [phone] during business hours (8-5:00 M-F).  If after clinic hours, or on the weekend, please call  [phone].    Financial Assistance [phone]  [facility] Billing [phone]  Central Billing Office, [facility]: [phone]  [place] Billing [phone]  Medical Records [phone]  [place] Patient Bill of Rights [email or web address]       MENTAL HEALTH CRISIS NUMBERS:  For a medical emergency please call  911 or go to the nearest ER.     [facility]:   [facility] -[phone]   Crisis Residence [facility] -[phone]   Walk-In Counseling Center [facility] -[phone]   COPE 24/7 [place] Mobile Team -[phone] (adults)/[phone] (child)  CHILD: [facility] needs assessment team - [phone]       [facility]:   [facility] - [phone]   Walk-in counseling [facility] - [phone]   Walk-in counseling [facility] Family [facility] - [phone]   Crisis Residence [facility] - [phone]  Urgent Care Adult Mental Tuvvbh-[phone] mobile unit/ 24/7 crisis line    National Crisis Numbers:   National Suicide Prevention Lifeline: 2-248-898-TALK (576-022-7750)  Poison Control Center - 8-663-665-0166  [employer]/resources for a list of additional resources (SOS)  Trans Lifeline a hotline for transgender people 6-318-460-2753  The Danyel Project a hotline for LGBT youth 1-850.165.4787  Crisis Text Line: For any crisis 24/7   To: 007634  see www.crisistextline.org  - IF MAKING A CALL FEELS TOO HARD, send a text!         Again thank you for choosing [facility] and please let us know how we can best partner with you to improve you and your family's health.    You may be receiving a survey regarding this appointment. We would love to have your feedback, both positive and negative. The survey is done by an external company, so your answers are anonymous.     Improved

## 2023-03-30 NOTE — ED ADULT TRIAGE NOTE - BSA (M2)
Recent Labs     03/29/23 2040 03/30/23  0617   WBC 16 49* 11 12*     · Suspect reactive in the setting of acute pneumomediastinum  · Trending down  Monitor off antibiotics  1.52

## 2023-08-16 NOTE — PROGRESS NOTE BEHAVIORAL HEALTH - DETAILS
Urinary incontinence, currently being treated for UTI Urinary and bowel incontinence, shooting pain in bilateral legs, groin numbness Size Of Lesion In Cm (Optional): 0 Introduction Text (Please End With A Colon): ; Instructions (Optional): Right clavicle Procedure To Be Performed At Next Visit: Excision Detail Level: Generalized Instructions (Optional): Right medial trapezius Procedure To Be Performed At Next Visit: Intralesional Kenalog Procedure To Be Performed At Next Visit: Cryotherapy Instructions (Optional): A. Left lip 1.0 Procedure To Be Performed At Next Visit: Biopsy by shave method Procedure To Be Performed At Next Visit: Shave Removal Instructions (Optional): B. Left lateral upper back 0.6\\nC. Left superior mid back 0.6\\nD. Left inferior mid back 0.6\\nE. Left inferior upper arm 1.0\\nF. Right anterior deltoid 0.7\\nG. Left anterior thigh 0.7

## 2023-09-08 NOTE — H&P ADULT - NSHPLABSRESULTS_GEN_ALL_CORE
LABS:                        10.9 (baseline 13-14)  7.43  )-----------( 351      ( 20 Mar 2021 15:52 )             33.6     03-20    139  |  101  |  13  ----------------------------<  124<H>  3.7   |  28  |  0.88    Ca    8.8      20 Mar 2021 15:52  Mg     1.9     -20    TPro  6.0  /  Alb  3.0<L>  /  TBili  0.4  /  DBili  x   /  AST  17  /  ALT  14  /  AlkPhos  51  03-20    PT/INR - ( 20 Mar 2021 15:52 )   PT: 12.9 sec;   INR: 1.08 ratio         PTT - ( 20 Mar 2021 15:52 )  PTT:27.4 sec      Urinalysis Basic - ( 20 Mar 2021 21:40 )    Color: Light Orange / Appearance: Slightly Turbid / S.017 / pH: x  Gluc: x / Ketone: Negative  / Bili: Negative / Urobili: Negative   Blood: x / Protein: 30 mg/dL / Nitrite: Negative   Leuk Esterase: Negative / RBC: 1633 /hpf / WBC 8 /HPF   Sq Epi: x / Non Sq Epi: 0 /hpf / Bacteria: Negative          COVID-19 PCR: Shanae (21 @ 15:50) n/a

## 2023-09-12 NOTE — PROGRESS NOTE ADULT - PROBLEM SELECTOR PLAN 3
Hx of BZD abuse per chart review. Not on meds. Would avoid use of meds for sx if possible.  BP improved now. Unique Flap 3 Text: Because of the through-and-through defect of the lateral ala, in order to restore the nose and maintain an open airway, a island-pedicle Spear flap was planned with cheek advancement flap.  After prep and anesthesia, a template was made of the right side of the lip and transferred to the left side to outline the normal anatomic position of the triangular portion of the upper lip.  A template was then made of the lining and outer defect of the left ala and transferred to the medial cheek adjacent to the nasolabial fold.  An island-pedicle flap was incised and elevated and carefully dissected to a muscular subcutaneous pedicle based near the piriform aperture and ascending portion of the maxilla.  This was carefully turned/carried over and sutured to line the ala, then turned up on itself where it was sutured in a layered fashion.  \\n\\nTo close the donor site defect, a cheek flap was elevated by undermining in the subcutaneous plane lateral to the lateral canthus  After hemostasis was obtained the flap was carried over medially, attached to the piriform aperture of the axilla and ascending portion of the maxilla, and then closed in a layered fashion.  The advancement flap measured 3 x 4 cm.

## 2023-10-25 NOTE — ED PROVIDER NOTE - CROS ED CONS ALL NEG
Render Risk Assessment In Note?: no Note Text (......Xxx Chief Complaint.): This diagnosis correlates with the Detail Level: Zone Other (Free Text): Daughter negative...

## 2024-09-18 NOTE — PATIENT PROFILE ADULT - CENTRAL VENOUS CATHETER/PICC LINE
Pt called stating that her script went to the wrong DME company, I need a reprint of the respiratory therapy supplies under medications from 3/24/23.  Fax to [facility] with attention to resp therapist. [phone]
no
Heterosexual

## 2024-11-13 NOTE — PHYSICAL THERAPY INITIAL EVALUATION ADULT - LEVEL OF CONSCIOUSNESS, REHAB EVAL
Occupational Therapy    Visit Type: initial evaluation  SUBJECTIVE  \"I don't have pain cause I can feel it!\"  \"I don't want to leave until I can feel this and feel pain and have the pain under control.\"  Patient / Family Goal: return to previous functional status, maximize function and return home    Pain   Patient denies pain.    OBJECTIVE     Cognitive Status   Functional Communication   - Overall Communication Status: within functional limits   - Forms of Communication: verbal  Attention Span    - Attention: intact  Following Direction   - follows all commands and directions consistently  Memory   - intact  Safety Awareness/Insight   - intact  Awareness of Deficits   - fully aware of deficits    Vitals:  114/56 after activity.       Range of Motion (ROM)   (degrees unless noted; active unless noted; norms in ( ); negative=lacking to 0, positive=beyond 0)  WFL: LUE, RUE    Strength  (out of 5 unless noted, standard test position unless noted)   WFL: LUE, RUE      Sitting Balance  (WM = base of support)  Static      - Trial 1 details: independent    Standing Balance  (WM = base of support)  Firm Surface: Double Leg      - Static, Eyes Open       - Trial 1 details: contact guard      Coordination  LUE: intact   RUE: intact      Muscle Tone  LUE: WFL  RUE: WFL      Transfers  Assistive devices: gait belt, 2-wheeled walker  - Sit to stand: contact guard/touching/steadying assist  - Stand to sit: contact guard/touching/steadying assist  - Stand pivot: contact guard/touching/steadying assist      Activities of Daily Living (ADLs)  Grooming/Oral Hygiene:   - Grooming assist: contact guard/touching/steadying assist  - Position: standing at sink  Lower Body Dressing:   - Assist: set up (demonstrated 4 figure pose bilaterally.)  - Position: chair  Toileting:   - Toilet transfer:        - Assist: contact guard/touching/steadying assist and with verbal cues       - Device: gait belt and 2-wheeled walker  - Assist: stand by  assist  - Position: sitting      Interventions    Treatment provided: activity tolerance, ADL training, body mechanics, balance retraining, compensatory techniques, transfer training, positioning and safety training  Skilled input: tactile instruction/cues, posture correction, verbal instruction/cues and facilitation  Verbal Consent: Writer verbally educated and received verbal consent for hand placement, positioning of patient, and techniques to be performed today from patient for clothing adjustments for techniques, hand placement and palpation for techniques and therapist position for techniques as described above and how they are pertinent to the patient's plan of care.         Education:   - Present and ready to learn: patient  Education provided during session:  - Results of above outlined education: Verbalizes understanding and Demonstrates understanding    ASSESSMENT   Interfering components: decreased activity tolerance    Discharge needs based on today's assessment:  - Current level of function: slightly below baseline level of function  - Therapy needs at discharge: therapy 1-3 times per week  - Activities of daily living (ADLs) requiring support at discharge: toileting, transfers, ambulation, dressing and grooming  - Impairments that require further therapy intervention: balance, activity tolerance, strength and safety awareness    Discussed positioning for grooming tasks to improve fall precautions. Also educated on hand positioning with toilet transfer. May benefit from further occupational therapy for further carryover.     AM-PAC  - Prior Level of Function: IND/MOD I ([facility] 22-24)       Key: MOD A=moderate assistance, IND/MOD I=independent/modified independent  - Generalized Current Level of Function     - Current Self-Cares: 22       Scoring Key= >21 Modified Independent; 20-21 Supervision; 18-19 Minimal assist; 13-17 Moderate assist; 9-12 Max assist; <9 Total assist        Personal Occupations  Profile Affected: bathing/showering, lower body dressing, upper body dressing, personal hygiene/grooming, functional mobility/transfers, toileting/toilet hygiene      Clinical decision making: Low - Patient has few limitations (1-3), comorbidities and/or complexities, as noted in problem focused assessment noted above, that impact their occupational profile.  Resulting in few treatment options and no task modification consistent with low clinical decision making complexity.    PLAN (while hospitalized)  Suggestions for next session as indicated: Trial reaching tasks for IADL  Carryover of hand placement for toilet transfers (on toilet due to lack of grab bars near toilet at home).   Carryover of squaring walker with sink for grooming task to prevent falls.    OT Frequency: 1-2 x per week      PT/OT ADL Equipment for Discharge: TBD owns shower chair  Interventions: ADL retraining, functional transfer training, activity tolerance training, balance, patient/family training, positioning, safety training, transfer training, body mechanics, coordination and patient education  Agreement to plan and goals: patient agrees with goals and treatment plan      GOALS  Long Term Goals: (to be met by time of discharge from hospital)  Grooming: Patient will complete grooming tasks in standing modified independent and without cues.  Toilet transfer: Patient will complete toilet transfer with gait belt and 2-wheeled walker, modified independent and without cues.   Item retrieval: Patient will complete item retrieval modified independent.     Documented in the chart in the following areas: Prior Function. Assessment/Plan.    Patient at End of Session:   Location: in chair  Safety measures: alarm system in place/re-engaged, call light within reach, equipment intact and lines intact  Handoff to: nurse      Therapy procedure time and total treatment time can be found documented on the Time Entry flowsheet   alert

## 2025-06-17 NOTE — BH CONSULTATION LIAISON ASSESSMENT NOTE - NSBHCONSULTMEDNEW_PSY_A_CORE
c/w Morphine E and oxy ir prn  c/w senna and miralax regular c/w Morphine Er 30 mg q8 and oxy ir 30 mg q4 prn  c/w senna and miralax no

## 2025-08-04 ENCOUNTER — TRANSCRIPTION ENCOUNTER (OUTPATIENT)
Age: 70
End: 2025-08-04

## 2025-08-04 ENCOUNTER — NON-APPOINTMENT (OUTPATIENT)
Age: 70
End: 2025-08-04